# Patient Record
Sex: FEMALE | Race: WHITE | NOT HISPANIC OR LATINO | Employment: OTHER | URBAN - METROPOLITAN AREA
[De-identification: names, ages, dates, MRNs, and addresses within clinical notes are randomized per-mention and may not be internally consistent; named-entity substitution may affect disease eponyms.]

---

## 2017-05-02 ENCOUNTER — TRANSCRIBE ORDERS (OUTPATIENT)
Dept: ADMINISTRATIVE | Facility: HOSPITAL | Age: 77
End: 2017-05-02

## 2017-05-02 DIAGNOSIS — N64.4 BREAST PAIN, LEFT: Primary | ICD-10-CM

## 2017-05-05 ENCOUNTER — HOSPITAL ENCOUNTER (OUTPATIENT)
Dept: MAMMOGRAPHY | Facility: HOSPITAL | Age: 77
Discharge: HOME | End: 2017-05-05
Attending: FAMILY MEDICINE | Admitting: FAMILY MEDICINE

## 2017-05-05 ENCOUNTER — TRANSCRIBE ORDERS (OUTPATIENT)
Dept: MAMMOGRAPHY | Facility: HOSPITAL | Age: 77
End: 2017-05-05

## 2017-05-05 DIAGNOSIS — R92.8 ABNORMAL MAMMOGRAM: Primary | ICD-10-CM

## 2017-05-05 DIAGNOSIS — N64.4 BREAST PAIN, LEFT: ICD-10-CM

## 2017-05-05 PROCEDURE — G0206 DX MAMMO INCL CAD UNI: HCPCS | Performed by: RADIOLOGY

## 2017-05-05 PROCEDURE — G0206 DX MAMMO INCL CAD UNI: HCPCS

## 2017-05-05 PROCEDURE — G0279 TOMOSYNTHESIS, MAMMO: HCPCS

## 2017-05-05 PROCEDURE — G0279 TOMOSYNTHESIS, MAMMO: HCPCS | Performed by: RADIOLOGY

## 2017-05-11 ENCOUNTER — HOSPITAL ENCOUNTER (OUTPATIENT)
Dept: ULTRASOUND IMAGING | Facility: HOSPITAL | Age: 77
Discharge: HOME | End: 2017-05-11
Attending: FAMILY MEDICINE | Admitting: FAMILY MEDICINE

## 2017-05-11 DIAGNOSIS — R92.8 ABNORMAL MAMMOGRAM: ICD-10-CM

## 2017-05-11 PROCEDURE — 76642 ULTRASOUND BREAST LIMITED: CPT

## 2017-05-11 PROCEDURE — 76642 ULTRASOUND BREAST LIMITED: CPT | Performed by: RADIOLOGY

## 2017-09-14 ENCOUNTER — TRANSCRIBE ORDERS (OUTPATIENT)
Dept: ADMINISTRATIVE | Facility: HOSPITAL | Age: 77
End: 2017-09-14

## 2017-09-14 DIAGNOSIS — Z12.31 VISIT FOR SCREENING MAMMOGRAM: Primary | ICD-10-CM

## 2017-10-06 ENCOUNTER — APPOINTMENT (OUTPATIENT)
Dept: MAMMOGRAPHY | Facility: HOSPITAL | Age: 77
End: 2017-10-06
Attending: FAMILY MEDICINE

## 2017-10-20 ENCOUNTER — HOSPITAL ENCOUNTER (OUTPATIENT)
Dept: MAMMOGRAPHY | Facility: HOSPITAL | Age: 77
Discharge: HOME | End: 2017-10-20
Attending: FAMILY MEDICINE | Admitting: FAMILY MEDICINE

## 2017-10-20 DIAGNOSIS — Z12.31 VISIT FOR SCREENING MAMMOGRAM: ICD-10-CM

## 2017-10-20 PROCEDURE — G0202 SCR MAMMO BI INCL CAD: HCPCS

## 2017-10-20 PROCEDURE — 77063 BREAST TOMOSYNTHESIS BI: CPT

## 2017-10-23 PROCEDURE — 77063 BREAST TOMOSYNTHESIS BI: CPT | Performed by: RADIOLOGY

## 2017-10-23 PROCEDURE — G0202 SCR MAMMO BI INCL CAD: HCPCS | Performed by: RADIOLOGY

## 2018-07-16 ENCOUNTER — TRANSCRIBE ORDERS (OUTPATIENT)
Dept: ADMINISTRATIVE | Facility: HOSPITAL | Age: 78
End: 2018-07-16

## 2018-07-16 ENCOUNTER — HOSPITAL ENCOUNTER (OUTPATIENT)
Dept: GENERAL RADIOLOGY | Facility: HOSPITAL | Age: 78
Discharge: HOME | End: 2018-07-16
Attending: FAMILY MEDICINE | Admitting: FAMILY MEDICINE

## 2018-07-16 DIAGNOSIS — M70.61 TROCHANTERIC BURSITIS OF RIGHT HIP: ICD-10-CM

## 2018-07-16 DIAGNOSIS — M70.61 TROCHANTERIC BURSITIS OF RIGHT HIP: Primary | ICD-10-CM

## 2018-07-16 PROCEDURE — 73502 X-RAY EXAM HIP UNI 2-3 VIEWS: CPT

## 2018-08-02 ENCOUNTER — TRANSCRIBE ORDERS (OUTPATIENT)
Dept: ADMINISTRATIVE | Facility: HOSPITAL | Age: 78
End: 2018-08-02

## 2018-08-02 ENCOUNTER — HOSPITAL ENCOUNTER (OUTPATIENT)
Dept: GENERAL RADIOLOGY | Facility: HOSPITAL | Age: 78
Discharge: HOME | End: 2018-08-02
Attending: FAMILY MEDICINE | Admitting: FAMILY MEDICINE

## 2018-08-02 DIAGNOSIS — R05.9 COUGH: Primary | ICD-10-CM

## 2018-08-02 DIAGNOSIS — R05.9 COUGH: ICD-10-CM

## 2018-08-02 PROCEDURE — 71046 X-RAY EXAM CHEST 2 VIEWS: CPT

## 2018-08-27 ENCOUNTER — TRANSCRIBE ORDERS (OUTPATIENT)
Dept: ADMINISTRATIVE | Facility: HOSPITAL | Age: 78
End: 2018-08-27

## 2018-08-27 DIAGNOSIS — Z12.31 VISIT FOR SCREENING MAMMOGRAM: Primary | ICD-10-CM

## 2018-10-01 ENCOUNTER — OUTSIDE FACILITY SERVICE (OUTPATIENT)
Dept: GASTROENTEROLOGY | Facility: CLINIC | Age: 78
End: 2018-10-01

## 2018-10-01 ENCOUNTER — LAB REQUISITION (OUTPATIENT)
Dept: LAB | Facility: HOSPITAL | Age: 78
End: 2018-10-01

## 2018-10-01 DIAGNOSIS — K21.9 GASTRO-ESOPHAGEAL REFLUX DISEASE WITHOUT ESOPHAGITIS: ICD-10-CM

## 2018-10-01 PROCEDURE — 88305 TISSUE EXAM BY PATHOLOGIST: CPT | Performed by: INTERNAL MEDICINE

## 2018-10-01 PROCEDURE — G0500 MOD SEDAT ENDO SERVICE >5YRS: HCPCS | Performed by: INTERNAL MEDICINE

## 2018-10-01 PROCEDURE — 43239 EGD BIOPSY SINGLE/MULTIPLE: CPT | Performed by: INTERNAL MEDICINE

## 2018-10-02 LAB
CYTO UR: NORMAL
LAB AP CASE REPORT: NORMAL
LAB AP CLINICAL INFORMATION: NORMAL
PATH REPORT.FINAL DX SPEC: NORMAL
PATH REPORT.GROSS SPEC: NORMAL

## 2018-10-22 ENCOUNTER — HOSPITAL ENCOUNTER (OUTPATIENT)
Dept: MAMMOGRAPHY | Facility: HOSPITAL | Age: 78
Discharge: HOME | End: 2018-10-22
Attending: FAMILY MEDICINE | Admitting: FAMILY MEDICINE

## 2018-10-22 DIAGNOSIS — Z12.31 VISIT FOR SCREENING MAMMOGRAM: ICD-10-CM

## 2018-10-22 PROCEDURE — 77063 BREAST TOMOSYNTHESIS BI: CPT

## 2018-10-22 PROCEDURE — 77067 SCR MAMMO BI INCL CAD: CPT | Performed by: RADIOLOGY

## 2018-10-22 PROCEDURE — 77067 SCR MAMMO BI INCL CAD: CPT

## 2018-10-22 PROCEDURE — 77063 BREAST TOMOSYNTHESIS BI: CPT | Performed by: RADIOLOGY

## 2018-11-05 ENCOUNTER — HOSPITAL ENCOUNTER (OUTPATIENT)
Dept: MAMMOGRAPHY | Facility: HOSPITAL | Age: 78
Discharge: HOME | End: 2018-11-05
Admitting: RADIOLOGY

## 2018-11-05 DIAGNOSIS — R92.8 ABNORMAL MAMMOGRAM: ICD-10-CM

## 2018-11-05 PROCEDURE — G0279 TOMOSYNTHESIS, MAMMO: HCPCS

## 2018-11-05 PROCEDURE — 77065 DX MAMMO INCL CAD UNI: CPT | Performed by: RADIOLOGY

## 2018-11-05 PROCEDURE — 77065 DX MAMMO INCL CAD UNI: CPT

## 2018-11-05 PROCEDURE — G0279 TOMOSYNTHESIS, MAMMO: HCPCS | Performed by: RADIOLOGY

## 2018-12-26 ENCOUNTER — TRANSCRIBE ORDERS (OUTPATIENT)
Dept: ADMINISTRATIVE | Facility: HOSPITAL | Age: 78
End: 2018-12-26

## 2018-12-26 ENCOUNTER — HOSPITAL ENCOUNTER (OUTPATIENT)
Dept: GENERAL RADIOLOGY | Facility: HOSPITAL | Age: 78
Discharge: HOME | End: 2018-12-26
Attending: FAMILY MEDICINE | Admitting: FAMILY MEDICINE

## 2018-12-26 DIAGNOSIS — R09.89 CAROTID BRUIT, UNSPECIFIED LATERALITY: Primary | ICD-10-CM

## 2018-12-26 DIAGNOSIS — M50.30 DDD (DEGENERATIVE DISC DISEASE), CERVICAL: Primary | ICD-10-CM

## 2018-12-26 PROCEDURE — 72050 X-RAY EXAM NECK SPINE 4/5VWS: CPT

## 2019-01-03 ENCOUNTER — APPOINTMENT (OUTPATIENT)
Dept: CARDIOLOGY | Facility: HOSPITAL | Age: 79
End: 2019-01-03
Attending: FAMILY MEDICINE

## 2019-01-08 ENCOUNTER — HOSPITAL ENCOUNTER (OUTPATIENT)
Dept: CARDIOLOGY | Facility: HOSPITAL | Age: 79
Discharge: HOME | End: 2019-01-08
Attending: FAMILY MEDICINE | Admitting: FAMILY MEDICINE

## 2019-01-08 DIAGNOSIS — R09.89 CAROTID BRUIT, UNSPECIFIED LATERALITY: ICD-10-CM

## 2019-01-08 LAB
BH CV XLRA MEAS LEFT DIST CCA EDV: 20 CM/SEC
BH CV XLRA MEAS LEFT DIST CCA PSV: 62 CM/SEC
BH CV XLRA MEAS LEFT DIST ICA EDV: 36 CM/SEC
BH CV XLRA MEAS LEFT DIST ICA PSV: 66 CM/SEC
BH CV XLRA MEAS LEFT ICA/CCA RATIO: 1
BH CV XLRA MEAS LEFT MID CCA EDV: 18 CM/SEC
BH CV XLRA MEAS LEFT MID CCA PSV: 51 CM/SEC
BH CV XLRA MEAS LEFT MID ICA EDV: 33 CM/SEC
BH CV XLRA MEAS LEFT MID ICA PSV: 60 CM/SEC
BH CV XLRA MEAS LEFT PROX CCA EDV: 22 CM/SEC
BH CV XLRA MEAS LEFT PROX CCA PSV: 60 CM/SEC
BH CV XLRA MEAS LEFT PROX ECA EDV: 1 CM/SEC
BH CV XLRA MEAS LEFT PROX ECA PSV: 50 CM/SEC
BH CV XLRA MEAS LEFT PROX ICA EDV: 28 CM/SEC
BH CV XLRA MEAS LEFT PROX ICA PSV: 53 CM/SEC
BH CV XLRA MEAS LEFT PROX SCLA EDV: 0 CM/SEC
BH CV XLRA MEAS LEFT PROX SCLA PSV: 194 CM/SEC
BH CV XLRA MEAS LEFT VERTEBRAL A EDV: 14 CM/SEC
BH CV XLRA MEAS LEFT VERTEBRAL A PSV: 53 CM/SEC
BH CV XLRA MEAS RIGHT DIST CCA EDV: 18 CM/SEC
BH CV XLRA MEAS RIGHT DIST CCA PSV: 112 CM/SEC
BH CV XLRA MEAS RIGHT DIST ICA EDV: 31 CM/SEC
BH CV XLRA MEAS RIGHT DIST ICA PSV: 97 CM/SEC
BH CV XLRA MEAS RIGHT ICA/CCA RATIO: 1
BH CV XLRA MEAS RIGHT MID CCA EDV: 24 CM/SEC
BH CV XLRA MEAS RIGHT MID CCA PSV: 116 CM/SEC
BH CV XLRA MEAS RIGHT MID ICA EDV: 32 CM/SEC
BH CV XLRA MEAS RIGHT MID ICA PSV: 110 CM/SEC
BH CV XLRA MEAS RIGHT PROX CCA EDV: 29 CM/SEC
BH CV XLRA MEAS RIGHT PROX CCA PSV: 99 CM/SEC
BH CV XLRA MEAS RIGHT PROX ECA EDV: 2 CM/SEC
BH CV XLRA MEAS RIGHT PROX ECA PSV: 112 CM/SEC
BH CV XLRA MEAS RIGHT PROX ICA EDV: 29 CM/SEC
BH CV XLRA MEAS RIGHT PROX ICA PSV: 96 CM/SEC
BH CV XLRA MEAS RIGHT PROX SCLA EDV: 3 CM/SEC
BH CV XLRA MEAS RIGHT PROX SCLA PSV: 187 CM/SEC
BH CV XLRA MEAS RIGHT VERTEBRAL A EDV: 11 CM/SEC
BH CV XLRA MEAS RIGHT VERTEBRAL A PSV: 43 CM/SEC
LEFT ARM BP: NORMAL MMHG
RIGHT ARM BP: NORMAL MMHG

## 2019-01-08 PROCEDURE — 93880 EXTRACRANIAL BILAT STUDY: CPT

## 2019-01-08 PROCEDURE — 93880 EXTRACRANIAL BILAT STUDY: CPT | Performed by: INTERNAL MEDICINE

## 2019-01-22 ENCOUNTER — CONSULT (OUTPATIENT)
Dept: CARDIOLOGY | Facility: CLINIC | Age: 79
End: 2019-01-22

## 2019-01-22 VITALS
WEIGHT: 123 LBS | BODY MASS INDEX: 24.8 KG/M2 | HEIGHT: 59 IN | DIASTOLIC BLOOD PRESSURE: 64 MMHG | HEART RATE: 82 BPM | SYSTOLIC BLOOD PRESSURE: 112 MMHG

## 2019-01-22 DIAGNOSIS — E78.2 MIXED HYPERLIPIDEMIA: ICD-10-CM

## 2019-01-22 DIAGNOSIS — I73.9 PVD (PERIPHERAL VASCULAR DISEASE) (HCC): Primary | ICD-10-CM

## 2019-01-22 PROCEDURE — 93000 ELECTROCARDIOGRAM COMPLETE: CPT | Performed by: INTERNAL MEDICINE

## 2019-01-22 PROCEDURE — 99204 OFFICE O/P NEW MOD 45 MIN: CPT | Performed by: INTERNAL MEDICINE

## 2019-01-22 RX ORDER — OMEPRAZOLE 40 MG/1
40 CAPSULE, DELAYED RELEASE ORAL DAILY
COMMUNITY
End: 2021-09-15

## 2019-01-22 RX ORDER — TRIAMTERENE AND HYDROCHLOROTHIAZIDE 37.5; 25 MG/1; MG/1
1 CAPSULE ORAL EVERY MORNING
COMMUNITY
End: 2022-01-03

## 2019-01-22 RX ORDER — ASPIRIN 325 MG
325 TABLET, DELAYED RELEASE (ENTERIC COATED) ORAL DAILY
COMMUNITY

## 2019-01-22 RX ORDER — HYDROCODONE BITARTRATE AND ACETAMINOPHEN 5; 325 MG/1; MG/1
1 TABLET ORAL DAILY PRN
COMMUNITY
End: 2020-11-26 | Stop reason: HOSPADM

## 2019-01-22 RX ORDER — HYOSCYAMINE SULFATE 0.125 MG
TABLET,DISINTEGRATING ORAL DAILY
COMMUNITY
End: 2020-07-01

## 2019-01-22 RX ORDER — LANOLIN ALCOHOL/MO/W.PET/CERES
1000 CREAM (GRAM) TOPICAL DAILY
COMMUNITY
End: 2022-08-18

## 2019-01-22 RX ORDER — ATORVASTATIN CALCIUM 20 MG/1
20 TABLET, FILM COATED ORAL DAILY
COMMUNITY
End: 2019-03-27

## 2019-01-22 RX ORDER — LOSARTAN POTASSIUM 25 MG/1
25 TABLET ORAL DAILY
COMMUNITY
End: 2020-05-28

## 2019-01-22 RX ORDER — LEVOTHYROXINE SODIUM 0.05 MG/1
50 TABLET ORAL DAILY
COMMUNITY
End: 2020-11-19

## 2019-01-22 RX ORDER — ACETAMINOPHEN 500 MG
500 TABLET ORAL EVERY 6 HOURS PRN
COMMUNITY

## 2019-01-22 NOTE — PROGRESS NOTES
[facility] at [facility]  Consultation H&P  Danay Gonzales  1940    There is no work phone number on file..    VISIT DATE:  01/22/2019    PCP: DEANN Alejandra MD  [address] [facility] [address]  [facility] [address]    CC:  Chief Complaint   Patient presents with   • carotid artery stenosis     Previous cardiac studies and procedures:  January 2018 carotid duplex  · Proximal right internal carotid artery plaque without significant stenosis.  · Right internal carotid artery stenosis of 0-49%.  · Proximal left internal carotid artery plaque without significant stenosis.  · Left internal carotid artery stenosis of 0-49%.  · Incidental finding: left sided fluid filled cyst measuring (1.3 X 1.3 ) adjacent to left common carotid.  · Blunted and delayed waveforms throughout left common carotid, external carotid, and internal carotid arteries suggestive of significant proximal disease. Consider CTA for further evaluation.    ASSESSMENT:   Diagnosis Plan   1. PVD (peripheral vascular disease) (CMS/[facility])  CT Angiogram Chest With & Without Contrast    Creatinine, Serum   Suspect high-grade ostial or proximal left common carotid disease - currently appears asymptomatic.      PLAN:  -CTA chest to more definitively evaluate aortic arch and proximal portion of the left common carotid artery.  -Continue aggressive medical therapy.  Continue aspirin, afterload well controlled.  We'll gradually titrate pharmacologic therapy for dyslipidemia, goal LDL less than 70.  Repeat lipid panel pending.    History of Present Illness   78-year-old female with recently uncovered left carotid bruit.  She reported a transient right sided numbness and posterior neck discomfort after waking up an unusual position at night.  Symptoms gradually resolved.  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  Undergoing physical therapy.  She reports that she was remotely evaluated for potential TIA or stroke when she noted  "right-sided weakness approximately 15 years ago.  She still feels like her right side is subjectively weaker than her left side.  She has previously had imaging which is been negative for stroke.  She has a strong family history of cardiovascular disease.  She is compliant with medical therapy.  Blood pressures run less than 130/80 mmHg.  Atorvastatin was recently increased from 10 mg to 20 mg and October 2018.  She recently underwent carotid duplex imaging which was concerning for a significant proximal stenosis that was not well visualized by 2-D Doppler imaging.  She currently denies lightheadedness, focal paresthesias or paralysis, visual changes, or headaches.    Labs from November 2018: Hemoglobin A1c 6.8, total costal 214, triglycerides 214, HDL 35,   PHYSICAL EXAMINATION:  Vitals:    01/22/19 1423   BP: 112/64   BP Location: Left arm   Patient Position: Sitting   Pulse: 82   Weight: 55.8 kg (123 lb)   Height: 149.9 cm (59\")     General Appearance:    Alert, cooperative, no distress, appears stated age   Head:    Normocephalic, without obvious abnormality, atraumatic   Eyes:    conjunctiva/corneas clear, EOM's intact, fundi     benign, both eyes   Ears:    Normal TM's and external ear canals, both ears   Nose:   Nares normal, septum midline, mucosa normal, no drainage    or sinus tenderness   Throat:   Lips, mucosa, and tongue normal; teeth and gums normal   Neck:   Supple, symmetrical, trachea midline, no adenopathy;     thyroid:  no enlargement/tenderness/nodules; prominent bruit at the base of her left neck, bruit fades out as you approach the mandible was some minimal radiation along the left clavicle towards her shoulder.     Back:     Symmetric, no curvature, ROM normal, no CVA tenderness   Lungs:     Clear to auscultation bilaterally, respirations unlabored   Chest Wall:    No tenderness or deformity    Heart:    Regular rate and rhythm, S1 and S2 normal, 1/6 early peaking systolic murmur right " upper sternal border, rub   or gallop, normal carotid impulse bilaterally without bruit.   Abdomen:     Soft, non-tender, bowel sounds active all four quadrants,     no masses, no organomegaly   Extremities:   Extremities normal, atraumatic, no cyanosis or edema   Pulses:   2+ and symmetric all extremities   Skin:   Skin color, texture, turgor normal, no rashes or lesions   Lymph nodes:   Cervical, supraclavicular, and axillary nodes normal   Neurologic:   normal strength, sensation intact     throughout       Diagnostic Data:    ECG 12 Lead  Date/Time: 1/22/2019 2:27 PM  Performed by: Neal Abernathy III, MD  Authorized by: Neal Abernathy III, MD   Previous ECG: no previous ECG available  Rhythm: sinus rhythm  Clinical impression: normal ECG          No results found for: CHLPL, TRIG, HDL, LDLDIRECT  Lab Results   Component Value Date    GLUCOSE 137 (H) 06/07/2014    BUN 19 06/07/2014    CREATININE 0.7 06/07/2014     06/07/2014    K 4.3 06/07/2014     06/07/2014    CO2 26 06/07/2014     No results found for: HGBA1C  Lab Results   Component Value Date    WBC 8.68 06/07/2014    HGB 13.1 06/07/2014    HCT 40.6 06/07/2014     06/07/2014       PROBLEM LIST:  Patient Active Problem List   Diagnosis   • PVD (peripheral vascular disease) (CMS/HCC)       PAST MEDICAL HX  Past Medical History:   Diagnosis Date   • Arthritis    • GERD (gastroesophageal reflux disease)    • Glaucoma    • Kidney disease    • Menopause    • Mumps    • Stroke (CMS/[facility])        Allergies  Allergies   Allergen Reactions   • Erythromycin Rash       Current Medications    Current Outpatient Medications:   •  acetaminophen (TYLENOL) 500 MG tablet, Take 500 mg by mouth 2 (Two) Times a Day., Disp: , Rfl:   •  aspirin  MG tablet, Take 325 mg by mouth Daily., Disp: , Rfl:   •  atorvastatin (LIPITOR) 20 MG tablet, Take 20 mg by mouth Daily., Disp: , Rfl:   •  calcium citrate-vitamin d (CITRACAL) 200-250 MG-UNIT tablet tablet, Take  by  mouth 2 (Two) Times a Day., Disp: , Rfl:   •  HYDROcodone-acetaminophen (NORCO) 5-325 MG per tablet, Take 1 tablet by mouth Every 6 (Six) Hours As Needed., Disp: , Rfl:   •  hyoscyamine sulfate (ANASPAZ) 0.125 MG tablet dispersible disintegrating tablet, Take  by mouth Daily., Disp: , Rfl:   •  levothyroxine (SYNTHROID, LEVOTHROID) 50 MCG tablet, Take 50 mcg by mouth Daily., Disp: , Rfl:   •  losartan (COZAAR) 25 MG tablet, Take 25 mg by mouth Daily., Disp: , Rfl:   •  Multiple Vitamin (MULTI-VITAMIN DAILY PO), Take  by mouth Daily., Disp: , Rfl:   •  omeprazole (priLOSEC) 40 MG capsule, Take 40 mg by mouth Daily., Disp: , Rfl:   •  potassium phosphate, monobasic, (K-PHOS) 500 MG tablet, Take 500 mg by mouth Daily., Disp: , Rfl:   •  triamterene-hydrochlorothiazide (DYAZIDE) 37.5-25 MG per capsule, Take 1 capsule by mouth Every Morning., Disp: , Rfl:   •  vitamin B-12 (CYANOCOBALAMIN) 1000 MCG tablet, Take 1,000 mcg by mouth Daily., Disp: , Rfl:          ROS  Review of Systems   Constitution: Positive for malaise/fatigue.   Respiratory: Positive for cough.    Musculoskeletal: Positive for arthritis, muscle weakness and myalgias.   Gastrointestinal: Positive for heartburn.     All other body systems reviewed and are negative    SOCIAL HX  Social History     Socioeconomic History   • Marital status:      Spouse name: Not on file   • Number of children: Not on file   • Years of education: Not on file   • Highest education level: Not on file   Social Needs   • Financial resource strain: Not on file   • Food insecurity - worry: Not on file   • Food insecurity - inability: Not on file   • Transportation needs - medical: Not on file   • Transportation needs - non-medical: Not on file   Occupational History   • Not on file   Tobacco Use   • Smoking status: Never Smoker   • Smokeless tobacco: Never Used   Substance and Sexual Activity   • Alcohol use: No     Frequency: Never   • Drug use: No   • Sexual activity: Defer    Other Topics Concern   • Not on file   Social History Narrative   • Not on file       FAMILY HX  Family History   Problem Relation Age of Onset   • Heart disease Mother    • Heart attack Mother    • Lung disease Father    • Heart disease Sister    • Cancer Sister    • Heart disease Brother    • Cancer Sister    • Cancer Brother    • Heart disease Brother    • Heart disease Brother    • Breast cancer Neg Hx    • Ovarian cancer Neg Hx              Neal Abernathy III, MD, FACC

## 2019-03-22 ENCOUNTER — LAB REQUISITION (OUTPATIENT)
Dept: LAB | Facility: HOSPITAL | Age: 79
End: 2019-03-22

## 2019-03-22 ENCOUNTER — HOSPITAL ENCOUNTER (OUTPATIENT)
Dept: CT IMAGING | Facility: HOSPITAL | Age: 79
Discharge: HOME | End: 2019-03-22
Admitting: INTERNAL MEDICINE

## 2019-03-22 DIAGNOSIS — Z00.00 ROUTINE GENERAL MEDICAL EXAMINATION AT A HEALTH CARE FACILITY: ICD-10-CM

## 2019-03-22 LAB
ARTICHOKE IGE QN: 105 MG/DL (ref 0–130)
CHOLEST SERPL-MCNC: 162 MG/DL (ref 0–200)
CREAT BLD-MCNC: 1.5 MG/DL (ref 0.6–1.3)
GFR SERPL CREATININE-BSD FRML MDRD: 33 ML/MIN/1.73
HDLC SERPL-MCNC: 32 MG/DL (ref 40–60)
TRIGL SERPL-MCNC: 189 MG/DL (ref 0–150)

## 2019-03-22 PROCEDURE — 82565 ASSAY OF CREATININE: CPT

## 2019-03-22 PROCEDURE — 80061 LIPID PANEL: CPT | Performed by: INTERNAL MEDICINE

## 2019-03-22 PROCEDURE — 0 IOPAMIDOL PER 1 ML: Performed by: INTERNAL MEDICINE

## 2019-03-22 PROCEDURE — 82565 ASSAY OF CREATININE: CPT | Performed by: INTERNAL MEDICINE

## 2019-03-22 PROCEDURE — 71275 CT ANGIOGRAPHY CHEST: CPT

## 2019-03-22 RX ADMIN — IOPAMIDOL 50 ML: 755 INJECTION, SOLUTION INTRAVENOUS at 12:28

## 2019-03-25 ENCOUNTER — TELEPHONE (OUTPATIENT)
Dept: CARDIOLOGY | Facility: CLINIC | Age: 79
End: 2019-03-25

## 2019-03-25 DIAGNOSIS — R93.89 ABNORMAL FINDINGS ON DIAGNOSTIC IMAGING OF OTHER SPECIFIED BODY STRUCTURES: Primary | ICD-10-CM

## 2019-03-25 NOTE — TELEPHONE ENCOUNTER
Patient notified of GI referral placed to evaluate esophageal thickening noted on her CT scan. She verbalized understanding.States see .

## 2019-03-25 NOTE — TELEPHONE ENCOUNTER
----- Message from Neal Abernathy III, MD sent at 3/25/2019 10:34 AM EDT -----  Can you put in a referral to gastroenterology to evaluate esophageal thickening noted on her CT scan.

## 2019-03-26 LAB — CREAT BLDA-MCNC: 1.6 MG/DL (ref 0.6–1.3)

## 2019-03-27 ENCOUNTER — OFFICE VISIT (OUTPATIENT)
Dept: CARDIOLOGY | Facility: CLINIC | Age: 79
End: 2019-03-27

## 2019-03-27 VITALS
SYSTOLIC BLOOD PRESSURE: 110 MMHG | HEART RATE: 70 BPM | BODY MASS INDEX: 23.75 KG/M2 | WEIGHT: 121 LBS | DIASTOLIC BLOOD PRESSURE: 70 MMHG | HEIGHT: 60 IN | OXYGEN SATURATION: 98 %

## 2019-03-27 DIAGNOSIS — I73.9 PVD (PERIPHERAL VASCULAR DISEASE) (HCC): Primary | ICD-10-CM

## 2019-03-27 PROCEDURE — 99213 OFFICE O/P EST LOW 20 MIN: CPT | Performed by: INTERNAL MEDICINE

## 2019-03-27 RX ORDER — ROSUVASTATIN CALCIUM 20 MG/1
20 TABLET, COATED ORAL DAILY
Qty: 90 TABLET | Refills: 1 | Status: SHIPPED | OUTPATIENT
Start: 2019-03-27 | End: 2019-10-28

## 2019-03-27 RX ORDER — ROSUVASTATIN CALCIUM 20 MG/1
20 TABLET, COATED ORAL DAILY
Qty: 30 TABLET | Refills: 11 | Status: SHIPPED | OUTPATIENT
Start: 2019-03-27 | End: 2019-03-27 | Stop reason: SDUPTHER

## 2019-03-27 NOTE — PROGRESS NOTES
[facility] at [facility]  Office visit  Danay Gonzales  1940    There is no work phone number on file.    VISIT DATE:  03/27/2019    PCP: DEANN Alejandra MD  [address] [address]    CC:  Chief Complaint   Patient presents with   • PVD (peripheral vascular disease       Previous cardiac studies and procedures:  MRA head and neck 2014  1. No acute intracranial process.   2. Focal area of 50% stenosis in the proximal and/or M1 portion of the  left middle cerebral artery.   3. Normal neck MRA.    January 2019 carotid duplex  · Proximal right internal carotid artery plaque without significant stenosis.  · Right internal carotid artery stenosis of 0-49%.  · Proximal left internal carotid artery plaque without significant stenosis.  · Left internal carotid artery stenosis of 0-49%.  · Incidental finding: left sided fluid filled cyst measuring (1.3 X 1.3 ) adjacent to left common carotid.  · Blunted and delayed waveforms throughout left common carotid, external carotid, and internal carotid arteries suggestive of significant proximal disease. Consider CTA for further evaluation.    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    ASSESSMENT:   Diagnosis Plan   1. PVD (peripheral vascular disease) (CMS/[facility])         PLAN:  Left internal carotid artery stenosis: Greater than 90% ostial stenosis.  Imaging consistent with soft plaque.  Currently asymptomatic.  Anatomic variant regarding origins of the great vessels from the aortic arch increase the technical challenge of any potential percutaneous intervention.  Recommending continued aggressive medical therapy as long as she is asymptomatic.   "Continue aspirin and afterload reduction.  Goal blood pressure less than 130/80 mmHg.  Switching atorvastatin to rosuvastatin 20 mg p.o. daily, goal LDL less than 70.    Subjective  She reports stable functional capacity, a very active individual.  Denies chest pain, palpitations or dyspnea.  Remote history of TIA involving right-sided weakness.  No recent TIA-like symptoms.  No presyncope or syncope.  No visual changes.  Blood pressures running less than 130/80 mmHg.  She is compliant with medical therapy.  Reviewed most recent lipid profile which did improve with interval increase in atorvastatin from 10 mg p.o. daily to 20 mg p.o. daily.  Reviewed CTA imaging with neuro interventional colleague and discussed case.    PHYSICAL EXAMINATION:  Vitals:    03/27/19 1317   BP: 110/70   BP Location: Right arm   Patient Position: Sitting   Pulse: 70   SpO2: 98%   Weight: 54.9 kg (121 lb)   Height: 152.4 cm (60\")     General Appearance:    Alert, cooperative, no distress, appears stated age   Head:    Normocephalic, without obvious abnormality, atraumatic   Eyes:    conjunctiva/corneas clear   Nose:   Nares normal, septum midline, mucosa normal, no drainage   Throat:   Lips, teeth and gums normal   Neck:   Supple, symmetrical, trachea midline, no carotid    bruit or JVD   Lungs:     Clear to auscultation bilaterally, respirations unlabored   Chest Wall:    No tenderness or deformity    Heart:    Regular rate and rhythm, S1 and S2 normal, no murmur, rub   or gallop, bilateral carotid bruit, left greater than right.   Abdomen:     Soft, non-tender   Extremities:   Extremities normal, atraumatic, no cyanosis or edema   Pulses:   2+ and symmetric all extremities   Skin:   Skin color, texture, turgor normal, no rashes or lesions       Diagnostic Data:  Procedures  Lab Results   Component Value Date    TRIG 189 (H) 03/22/2019    HDL 32 (L) 03/22/2019     Lab Results   Component Value Date    GLUCOSE 137 (H) 06/07/2014    BUN 19 " 06/07/2014    CREATININE 1.50 (H) 03/22/2019     06/07/2014    K 4.3 06/07/2014     06/07/2014    CO2 26 06/07/2014     No results found for: HGBA1C  Lab Results   Component Value Date    WBC 8.68 06/07/2014    HGB 13.1 06/07/2014    HCT 40.6 06/07/2014     06/07/2014       Allergies  Allergies   Allergen Reactions   • Erythromycin Rash       Current Medications    Current Outpatient Medications:   •  acetaminophen (TYLENOL) 500 MG tablet, Take 500 mg by mouth 2 (Two) Times a Day., Disp: , Rfl:   •  aspirin  MG tablet, Take 325 mg by mouth Daily., Disp: , Rfl:   •  atorvastatin (LIPITOR) 20 MG tablet, Take 20 mg by mouth Daily., Disp: , Rfl:   •  calcium citrate-vitamin d (CITRACAL) 200-250 MG-UNIT tablet tablet, Take  by mouth 2 (Two) Times a Day., Disp: , Rfl:   •  HYDROcodone-acetaminophen (NORCO) 5-325 MG per tablet, Take 1 tablet by mouth Every 6 (Six) Hours As Needed., Disp: , Rfl:   •  hyoscyamine sulfate (ANASPAZ) 0.125 MG tablet dispersible disintegrating tablet, Take  by mouth Daily., Disp: , Rfl:   •  levothyroxine (SYNTHROID, LEVOTHROID) 50 MCG tablet, Take 50 mcg by mouth Daily., Disp: , Rfl:   •  losartan (COZAAR) 25 MG tablet, Take 25 mg by mouth Daily., Disp: , Rfl:   •  Multiple Vitamin (MULTI-VITAMIN DAILY PO), Take  by mouth Daily., Disp: , Rfl:   •  omeprazole (priLOSEC) 40 MG capsule, Take 40 mg by mouth Daily., Disp: , Rfl:   •  potassium phosphate, monobasic, (K-PHOS) 500 MG tablet, Take 500 mg by mouth Daily., Disp: , Rfl:   •  triamterene-hydrochlorothiazide (DYAZIDE) 37.5-25 MG per capsule, Take 1 capsule by mouth Every Morning., Disp: , Rfl:   •  vitamin B-12 (CYANOCOBALAMIN) 1000 MCG tablet, Take 1,000 mcg by mouth Daily., Disp: , Rfl:           ROS  Review of Systems   Cardiovascular: Negative for chest pain and irregular heartbeat.   Respiratory: Positive for cough and shortness of breath.        SOCIAL HX  Social History     Socioeconomic History   • Marital  status:      Spouse name: Not on file   • Number of children: Not on file   • Years of education: Not on file   • Highest education level: Not on file   Tobacco Use   • Smoking status: Never Smoker   • Smokeless tobacco: Never Used   Substance and Sexual Activity   • Alcohol use: No     Frequency: Never   • Drug use: No   • Sexual activity: Defer       FAMILY HX  Family History   Problem Relation Age of Onset   • Heart disease Mother    • Heart attack Mother    • Lung disease Father    • Heart disease Sister    • Cancer Sister    • Heart disease Brother    • Cancer Sister    • Cancer Brother    • Heart disease Brother    • Heart disease Brother    • Breast cancer Neg Hx    • Ovarian cancer Neg Hx              Neal Abernathy III, MD, FACC

## 2019-04-16 ENCOUNTER — PRIOR AUTHORIZATION (OUTPATIENT)
Dept: CARDIOLOGY | Facility: CLINIC | Age: 79
End: 2019-04-16

## 2019-04-16 NOTE — TELEPHONE ENCOUNTER
PA initiated for Rosuvastatin initiated thru [employer] # [phone].Talked to Shira. Reference # [id number]. Awaiting determination.

## 2019-09-10 ENCOUNTER — TRANSCRIBE ORDERS (OUTPATIENT)
Dept: ADMINISTRATIVE | Facility: HOSPITAL | Age: 79
End: 2019-09-10

## 2019-09-10 DIAGNOSIS — Z12.31 VISIT FOR SCREENING MAMMOGRAM: Primary | ICD-10-CM

## 2019-10-28 ENCOUNTER — HOSPITAL ENCOUNTER (OUTPATIENT)
Dept: GENERAL RADIOLOGY | Facility: HOSPITAL | Age: 79
Discharge: HOME | End: 2019-10-28
Admitting: INTERNAL MEDICINE

## 2019-10-28 ENCOUNTER — LAB REQUISITION (OUTPATIENT)
Dept: LAB | Facility: HOSPITAL | Age: 79
End: 2019-10-28

## 2019-10-28 ENCOUNTER — OFFICE VISIT (OUTPATIENT)
Dept: CARDIOLOGY | Facility: CLINIC | Age: 79
End: 2019-10-28

## 2019-10-28 VITALS
HEIGHT: 60 IN | HEART RATE: 70 BPM | SYSTOLIC BLOOD PRESSURE: 132 MMHG | DIASTOLIC BLOOD PRESSURE: 56 MMHG | BODY MASS INDEX: 21.99 KG/M2 | OXYGEN SATURATION: 97 % | WEIGHT: 112 LBS

## 2019-10-28 DIAGNOSIS — I73.9 PVD (PERIPHERAL VASCULAR DISEASE) (HCC): Primary | ICD-10-CM

## 2019-10-28 DIAGNOSIS — Z00.00 ROUTINE GENERAL MEDICAL EXAMINATION AT A HEALTH CARE FACILITY: ICD-10-CM

## 2019-10-28 DIAGNOSIS — R07.2 PRECORDIAL PAIN: ICD-10-CM

## 2019-10-28 DIAGNOSIS — R06.09 DYSPNEA ON EXERTION: ICD-10-CM

## 2019-10-28 LAB
D DIMER PPP FEU-MCNC: 0.44 MCGFEU/ML (ref 0–0.56)
NT-PROBNP SERPL-MCNC: 101.4 PG/ML (ref 5–1800)
TROPONIN T SERPL-MCNC: <0.01 NG/ML (ref 0–0.03)

## 2019-10-28 PROCEDURE — 36415 COLL VENOUS BLD VENIPUNCTURE: CPT | Performed by: INTERNAL MEDICINE

## 2019-10-28 PROCEDURE — 83880 ASSAY OF NATRIURETIC PEPTIDE: CPT | Performed by: INTERNAL MEDICINE

## 2019-10-28 PROCEDURE — 85379 FIBRIN DEGRADATION QUANT: CPT | Performed by: INTERNAL MEDICINE

## 2019-10-28 PROCEDURE — 99214 OFFICE O/P EST MOD 30 MIN: CPT | Performed by: INTERNAL MEDICINE

## 2019-10-28 PROCEDURE — 71046 X-RAY EXAM CHEST 2 VIEWS: CPT

## 2019-10-28 PROCEDURE — 84484 ASSAY OF TROPONIN QUANT: CPT | Performed by: INTERNAL MEDICINE

## 2019-10-28 RX ORDER — ROSUVASTATIN CALCIUM 40 MG/1
40 TABLET, COATED ORAL DAILY
Qty: 90 TABLET | Refills: 1 | Status: SHIPPED | OUTPATIENT
Start: 2019-10-28 | End: 2019-10-31 | Stop reason: SDUPTHER

## 2019-10-28 NOTE — PROGRESS NOTES
[facility] at [facility]  Office visit  Danay Gonzales  1940    There is no work phone number on file.    VISIT DATE:  10/28/2019      PCP: DEANN Alejandra MD  [address] [address]    CC:  Chief Complaint   Patient presents with   • PVD (peripheral vascular disease) (CMS/HCC)       Previous cardiac studies and procedures:  MRA head and neck 2014  1. No acute intracranial process.   2. Focal area of 50% stenosis in the proximal and/or M1 portion of the  left middle cerebral artery.   3. Normal neck MRA.    January 2019 carotid duplex  · Proximal right internal carotid artery plaque without significant stenosis.  · Right internal carotid artery stenosis of 0-49%.  · Proximal left internal carotid artery plaque without significant stenosis.  · Left internal carotid artery stenosis of 0-49%.  · Incidental finding: left sided fluid filled cyst measuring (1.3 X 1.3 ) adjacent to left common carotid.  · Blunted and delayed waveforms throughout left common carotid, external carotid, and internal carotid arteries suggestive of significant proximal disease. Consider CTA for further evaluation.    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    ASSESSMENT:   Diagnosis Plan   1. PVD (peripheral vascular disease) (CMS/HCC)     2. Precordial pain  Troponin I    Stress Test With Myocardial Perfusion (1 Day)   3. Dyspnea on exertion  proBNP    D-dimer, Quantitative    Adult Transthoracic Echo Complete W/ Cont if Necessary Per Protocol    XR Chest 2 View       PLAN:  Left internal carotid artery stenosis: Greater than 90% ostial stenosis.  Imaging consistent with soft plaque.  Currently asymptomatic.   "Anatomic variant regarding origins of the great vessels from the aortic arch increase the technical challenge of any potential percutaneous intervention.  Recommending continued aggressive medical therapy as long as she is asymptomatic.  Continue aspirin and afterload reduction.  Goal blood pressure less than 130/80 mmHg.  Increasing rosuvastatin to 40 mg p.o. daily, goal LDL less than 70.    Intracranial atherosclerotic disease: Currently symptomatic.  Continue aggressive risk factor modification.    Sudden onset dyspnea and chest discomfort: Troponin, proBNP and d-dimer.  PA and lateral chest x-ray.  Transthoracic echo to assess underlying myocardial structure and function.  Jacquelin scan myocardial perfusion imaging for ischemia evaluation.    Subjective  She experienced an episode of sudden onset dyspnea and precordial chest discomfort after loading some laundry last evening.  Moderate in intensity, eventually radiated to her back.  Lasted for 2 to 3 hours.  Otherwise no change in baseline functional capacity.  She is compliant with medical therapy and feels back to baseline today.  Recent had a long car trip approximately 3 weeks ago on a trip up to [place] state.  Remote history of TIA involving right-sided weakness.  No recent TIA-like symptoms.  No presyncope or syncope.  No visual changes.  Blood pressures running less than 130/80 mmHg.  She is compliant with medical therapy.  Reviewed most recent lipid profile.  Reviewed CTA imaging with neuro interventional colleague and discussed case.    PHYSICAL EXAMINATION:  Vitals:    10/28/19 1121   BP: 132/56   BP Location: Right arm   Patient Position: Sitting   Pulse: 70   SpO2: 97%   Weight: 50.8 kg (112 lb)   Height: 152.4 cm (60\")     General Appearance:    Alert, cooperative, no distress, appears stated age   Head:    Normocephalic, without obvious abnormality, atraumatic   Eyes:    conjunctiva/corneas clear   Nose:   Nares normal, septum midline, mucosa normal, " no drainage   Throat:   Lips, teeth and gums normal   Neck:   Supple, symmetrical, trachea midline, no carotid    bruit or JVD   Lungs:     Clear to auscultation bilaterally, respirations unlabored   Chest Wall:    No tenderness or deformity    Heart:    Regular rate and rhythm, S1 and S2 normal, no murmur, rub   or gallop, bilateral carotid bruit, left greater than right.   Abdomen:     Soft, non-tender   Extremities:   Extremities normal, atraumatic, no cyanosis or edema   Pulses:   2+ and symmetric all extremities   Skin:   Skin color, texture, turgor normal, no rashes or lesions       Diagnostic Data:  Procedures  Lab Results   Component Value Date    TRIG 189 (H) 03/22/2019    HDL 32 (L) 03/22/2019     Lab Results   Component Value Date    GLUCOSE 137 (H) 06/07/2014    BUN 19 06/07/2014    CREATININE 1.50 (H) 03/22/2019     06/07/2014    K 4.3 06/07/2014     06/07/2014    CO2 26 06/07/2014     No results found for: HGBA1C  Lab Results   Component Value Date    WBC 8.68 06/07/2014    HGB 13.1 06/07/2014    HCT 40.6 06/07/2014     06/07/2014       Allergies  Allergies   Allergen Reactions   • Erythromycin Rash       Current Medications    Current Outpatient Medications:   •  acetaminophen (TYLENOL) 500 MG tablet, Take 500 mg by mouth 2 (Two) Times a Day., Disp: , Rfl:   •  aspirin  MG tablet, Take 325 mg by mouth Daily., Disp: , Rfl:   •  calcium citrate-vitamin d (CITRACAL) 200-250 MG-UNIT tablet tablet, Take  by mouth 2 (Two) Times a Day., Disp: , Rfl:   •  HYDROcodone-acetaminophen (NORCO) 5-325 MG per tablet, Take 1 tablet by mouth Every 6 (Six) Hours As Needed., Disp: , Rfl:   •  hyoscyamine sulfate (ANASPAZ) 0.125 MG tablet dispersible disintegrating tablet, Take  by mouth Daily., Disp: , Rfl:   •  levothyroxine (SYNTHROID, LEVOTHROID) 50 MCG tablet, Take 50 mcg by mouth Daily., Disp: , Rfl:   •  losartan (COZAAR) 25 MG tablet, Take 25 mg by mouth Daily., Disp: , Rfl:   •  Multiple  Vitamin (MULTI-VITAMIN DAILY PO), Take  by mouth Daily., Disp: , Rfl:   •  omeprazole (priLOSEC) 40 MG capsule, Take 40 mg by mouth Daily., Disp: , Rfl:   •  potassium phosphate, monobasic, (K-PHOS) 500 MG tablet, Take 500 mg by mouth Daily., Disp: , Rfl:   •  triamterene-hydrochlorothiazide (DYAZIDE) 37.5-25 MG per capsule, Take 1 capsule by mouth Every Morning., Disp: , Rfl:   •  vitamin B-12 (CYANOCOBALAMIN) 1000 MCG tablet, Take 1,000 mcg by mouth Daily., Disp: , Rfl:   •  rosuvastatin (CRESTOR) 40 MG tablet, Take 1 tablet by mouth Daily., Disp: 90 tablet, Rfl: 1          ROS  Review of Systems   Cardiovascular: Positive for dyspnea on exertion. Negative for chest pain and irregular heartbeat.   Respiratory: Positive for cough and shortness of breath.        SOCIAL HX  Social History     Socioeconomic History   • Marital status:      Spouse name: Not on file   • Number of children: Not on file   • Years of education: Not on file   • Highest education level: Not on file   Tobacco Use   • Smoking status: Never Smoker   • Smokeless tobacco: Never Used   Substance and Sexual Activity   • Alcohol use: No     Frequency: Never   • Drug use: No   • Sexual activity: Defer       FAMILY HX  Family History   Problem Relation Age of Onset   • Heart disease Mother    • Heart attack Mother    • Lung disease Father    • Heart disease Sister    • Cancer Sister    • Heart disease Brother    • Cancer Sister    • Cancer Brother    • Heart disease Brother    • Heart disease Brother    • Breast cancer Neg Hx    • Ovarian cancer Neg Hx              Neal Abernathy III, MD, [facility]

## 2019-10-31 RX ORDER — ROSUVASTATIN CALCIUM 40 MG/1
40 TABLET, COATED ORAL DAILY
Qty: 90 TABLET | Refills: 1 | Status: SHIPPED | OUTPATIENT
Start: 2019-10-31 | End: 2020-05-20

## 2019-11-02 ENCOUNTER — RESULTS ENCOUNTER (OUTPATIENT)
Dept: CARDIOLOGY | Facility: CLINIC | Age: 79
End: 2019-11-02

## 2019-11-02 DIAGNOSIS — R06.09 DYSPNEA ON EXERTION: ICD-10-CM

## 2019-11-12 ENCOUNTER — HOSPITAL ENCOUNTER (OUTPATIENT)
Dept: MAMMOGRAPHY | Facility: HOSPITAL | Age: 79
Discharge: HOME | End: 2019-11-12
Admitting: FAMILY MEDICINE

## 2019-11-12 DIAGNOSIS — Z12.31 VISIT FOR SCREENING MAMMOGRAM: ICD-10-CM

## 2019-11-12 PROCEDURE — 77067 SCR MAMMO BI INCL CAD: CPT | Performed by: RADIOLOGY

## 2019-11-12 PROCEDURE — 77063 BREAST TOMOSYNTHESIS BI: CPT | Performed by: RADIOLOGY

## 2019-11-12 PROCEDURE — 77067 SCR MAMMO BI INCL CAD: CPT

## 2019-11-12 PROCEDURE — 77063 BREAST TOMOSYNTHESIS BI: CPT

## 2019-11-25 ENCOUNTER — HOSPITAL ENCOUNTER (OUTPATIENT)
Dept: CARDIOLOGY | Facility: HOSPITAL | Age: 79
Discharge: HOME | End: 2019-11-25
Admitting: INTERNAL MEDICINE

## 2019-11-25 ENCOUNTER — HOSPITAL ENCOUNTER (OUTPATIENT)
Dept: CARDIOLOGY | Facility: HOSPITAL | Age: 79
Discharge: HOME | End: 2019-11-25

## 2019-11-25 VITALS
HEART RATE: 68 BPM | WEIGHT: 111.99 LBS | BODY MASS INDEX: 21.99 KG/M2 | SYSTOLIC BLOOD PRESSURE: 150 MMHG | DIASTOLIC BLOOD PRESSURE: 72 MMHG | HEIGHT: 60 IN

## 2019-11-25 VITALS
DIASTOLIC BLOOD PRESSURE: 61 MMHG | HEIGHT: 60 IN | WEIGHT: 111 LBS | SYSTOLIC BLOOD PRESSURE: 141 MMHG | BODY MASS INDEX: 21.79 KG/M2

## 2019-11-25 DIAGNOSIS — R06.09 DYSPNEA ON EXERTION: ICD-10-CM

## 2019-11-25 DIAGNOSIS — R07.2 PRECORDIAL PAIN: ICD-10-CM

## 2019-11-25 LAB
AORTIC DIMENSIONLESS INDEX: 0.4 (DI)
BH CV ECHO MEAS - AO MAX PG (FULL): 14.3 MMHG
BH CV ECHO MEAS - AO MAX PG: 16 MMHG
BH CV ECHO MEAS - AO MEAN PG (FULL): 9 MMHG
BH CV ECHO MEAS - AO MEAN PG: 9 MMHG
BH CV ECHO MEAS - AO ROOT AREA (BSA CORRECTED): 2.1
BH CV ECHO MEAS - AO ROOT AREA: 7.1 CM^2
BH CV ECHO MEAS - AO ROOT DIAM: 3 CM
BH CV ECHO MEAS - AO V2 MAX: 202 CM/SEC
BH CV ECHO MEAS - AO V2 MEAN: 145 CM/SEC
BH CV ECHO MEAS - AO V2 VTI: 44.8 CM
BH CV ECHO MEAS - ASC AORTA: 3.5 CM
BH CV ECHO MEAS - AVA(I,A): 1.3 CM^2
BH CV ECHO MEAS - AVA(I,D): 1.3 CM^2
BH CV ECHO MEAS - AVA(V,A): 1.1 CM^2
BH CV ECHO MEAS - AVA(V,D): 1.1 CM^2
BH CV ECHO MEAS - BSA(HAYCOCK): 1.5 M^2
BH CV ECHO MEAS - BSA: 1.5 M^2
BH CV ECHO MEAS - BZI_BMI: 21.7 KILOGRAMS/M^2
BH CV ECHO MEAS - BZI_METRIC_HEIGHT: 152.4 CM
BH CV ECHO MEAS - BZI_METRIC_WEIGHT: 50.3 KG
BH CV ECHO MEAS - EDV(CUBED): 50.7 ML
BH CV ECHO MEAS - EDV(MOD-SP2): 34.6 ML
BH CV ECHO MEAS - EDV(MOD-SP4): 32 ML
BH CV ECHO MEAS - EDV(TEICH): 58.1 ML
BH CV ECHO MEAS - EF(CUBED): 76 %
BH CV ECHO MEAS - EF(MOD-BP): 75.4 %
BH CV ECHO MEAS - EF(MOD-SP2): 75.2 %
BH CV ECHO MEAS - EF(MOD-SP4): 79.6 %
BH CV ECHO MEAS - EF(TEICH): 68.8 %
BH CV ECHO MEAS - ESV(CUBED): 12.2 ML
BH CV ECHO MEAS - ESV(MOD-SP2): 8.6 ML
BH CV ECHO MEAS - ESV(MOD-SP4): 6.5 ML
BH CV ECHO MEAS - ESV(TEICH): 18.1 ML
BH CV ECHO MEAS - FS: 37.8 %
BH CV ECHO MEAS - IVS/LVPW: 1
BH CV ECHO MEAS - IVSD: 0.9 CM
BH CV ECHO MEAS - LA DIMENSION: 3 CM
BH CV ECHO MEAS - LA/AO: 1
BH CV ECHO MEAS - LAD MAJOR: 4.3 CM
BH CV ECHO MEAS - LAT PEAK E' VEL: 8.1 CM/SEC
BH CV ECHO MEAS - LATERAL E/E' RATIO: 11.5
BH CV ECHO MEAS - LV DIASTOLIC VOL/BSA (35-75): 22 ML/M^2
BH CV ECHO MEAS - LV MASS(C)D: 96.9 GRAMS
BH CV ECHO MEAS - LV MASS(C)DI: 66.7 GRAMS/M^2
BH CV ECHO MEAS - LV MAX PG: 2 MMHG
BH CV ECHO MEAS - LV MEAN PG: 1 MMHG
BH CV ECHO MEAS - LV SYSTOLIC VOL/BSA (12-30): 4.5 ML/M^2
BH CV ECHO MEAS - LV V1 MAX: 70.9 CM/SEC
BH CV ECHO MEAS - LV V1 MEAN: 49 CM/SEC
BH CV ECHO MEAS - LV V1 VTI: 19.1 CM
BH CV ECHO MEAS - LVIDD: 3.7 CM
BH CV ECHO MEAS - LVIDS: 2.3 CM
BH CV ECHO MEAS - LVLD AP2: 5.9 CM
BH CV ECHO MEAS - LVLD AP4: 5.9 CM
BH CV ECHO MEAS - LVLS AP2: 4.8 CM
BH CV ECHO MEAS - LVLS AP4: 4 CM
BH CV ECHO MEAS - LVOT AREA (M): 3.1 CM^2
BH CV ECHO MEAS - LVOT AREA: 3.1 CM^2
BH CV ECHO MEAS - LVOT DIAM: 2 CM
BH CV ECHO MEAS - LVPWD: 0.9 CM
BH CV ECHO MEAS - MED PEAK E' VEL: 7.2 CM/SEC
BH CV ECHO MEAS - MEDIAL E/E' RATIO: 12.9
BH CV ECHO MEAS - MV A MAX VEL: 121 CM/SEC
BH CV ECHO MEAS - MV DEC SLOPE: 350 CM/SEC^2
BH CV ECHO MEAS - MV DEC TIME: 0.26 SEC
BH CV ECHO MEAS - MV E MAX VEL: 92.4 CM/SEC
BH CV ECHO MEAS - MV E/A: 0.76
BH CV ECHO MEAS - MV MAX PG: 6.6 MMHG
BH CV ECHO MEAS - MV MEAN PG: 2 MMHG
BH CV ECHO MEAS - MV V2 MAX: 128 CM/SEC
BH CV ECHO MEAS - MV V2 MEAN: 68.9 CM/SEC
BH CV ECHO MEAS - MV V2 VTI: 31.7 CM
BH CV ECHO MEAS - MVA(VTI): 1.9 CM^2
BH CV ECHO MEAS - PA ACC TIME: 0.15 SEC
BH CV ECHO MEAS - PA MAX PG: 3.7 MMHG
BH CV ECHO MEAS - PA PR(ACCEL): 12.4 MMHG
BH CV ECHO MEAS - PA V2 MAX: 96.4 CM/SEC
BH CV ECHO MEAS - RAP SYSTOLE: 3 MMHG
BH CV ECHO MEAS - RVSP: 28 MMHG
BH CV ECHO MEAS - SI(AO): 220.8 ML/M^2
BH CV ECHO MEAS - SI(CUBED): 26.5 ML/M^2
BH CV ECHO MEAS - SI(LVOT): 41.3 ML/M^2
BH CV ECHO MEAS - SI(MOD-SP2): 17.9 ML/M^2
BH CV ECHO MEAS - SI(MOD-SP4): 17.5 ML/M^2
BH CV ECHO MEAS - SI(TEICH): 27.5 ML/M^2
BH CV ECHO MEAS - SV(AO): 320.9 ML
BH CV ECHO MEAS - SV(CUBED): 38.5 ML
BH CV ECHO MEAS - SV(LVOT): 60 ML
BH CV ECHO MEAS - SV(MOD-SP2): 26 ML
BH CV ECHO MEAS - SV(MOD-SP4): 25.5 ML
BH CV ECHO MEAS - SV(TEICH): 40 ML
BH CV ECHO MEAS - TAPSE (>1.6): 1.97 CM2
BH CV ECHO MEAS - TR MAX PG: 25 MMHG
BH CV ECHO MEAS - TR MAX VEL: 248 CM/SEC
BH CV ECHO MEASUREMENTS AVERAGE E/E' RATIO: 12.08
BH CV STRESS BP STAGE 2: NORMAL
BH CV STRESS BP STAGE 4: NORMAL
BH CV STRESS COMMENTS STAGE 1: NORMAL
BH CV STRESS DOSE REGADENOSON STAGE 1: 0.4
BH CV STRESS DURATION MIN STAGE 1: 1
BH CV STRESS DURATION MIN STAGE 2: 1
BH CV STRESS DURATION MIN STAGE 3: 1
BH CV STRESS DURATION MIN STAGE 4: 1
BH CV STRESS DURATION SEC STAGE 2: 0
BH CV STRESS HR STAGE 4: 94
BH CV STRESS PROTOCOL 1: NORMAL
BH CV STRESS RECOVERY BP: NORMAL MMHG
BH CV STRESS RECOVERY HR: 93 BPM
BH CV STRESS STAGE 1: 1
BH CV STRESS STAGE 2: 2
BH CV STRESS STAGE 3: 3
BH CV STRESS STAGE 4: 4
BH CV VAS BP LEFT ARM: NORMAL MMHG
BH CV XLRA - RV BASE: 2.3 CM
BH CV XLRA - RV LENGTH: 4.6 CM
BH CV XLRA - RV MID: 2.1 CM
BH CV XLRA - TDI S': 8.38 CM/SEC
LEFT ATRIUM VOLUME INDEX: 29.2 ML/M^2
LEFT ATRIUM VOLUME: 42.5 ML
LV EF NUC BP: 60 %
MAXIMAL PREDICTED HEART RATE: 140 BPM
MAXIMAL PREDICTED HEART RATE: 140 BPM
PERCENT MAX PREDICTED HR: 77.14 %
STRESS BASELINE BP: NORMAL MMHG
STRESS BASELINE HR: 73 BPM
STRESS PERCENT HR: 91 %
STRESS POST PEAK BP: NORMAL MMHG
STRESS POST PEAK HR: 108 BPM
STRESS TARGET HR: 119 BPM
STRESS TARGET HR: 119 BPM

## 2019-11-25 PROCEDURE — 93017 CV STRESS TEST TRACING ONLY: CPT

## 2019-11-25 PROCEDURE — 93018 CV STRESS TEST I&R ONLY: CPT | Performed by: INTERNAL MEDICINE

## 2019-11-25 PROCEDURE — A9500 TC99M SESTAMIBI: HCPCS | Performed by: INTERNAL MEDICINE

## 2019-11-25 PROCEDURE — 93306 TTE W/DOPPLER COMPLETE: CPT | Performed by: INTERNAL MEDICINE

## 2019-11-25 PROCEDURE — 78452 HT MUSCLE IMAGE SPECT MULT: CPT

## 2019-11-25 PROCEDURE — 78452 HT MUSCLE IMAGE SPECT MULT: CPT | Performed by: INTERNAL MEDICINE

## 2019-11-25 PROCEDURE — 0 TECHNETIUM SESTAMIBI: Performed by: INTERNAL MEDICINE

## 2019-11-25 PROCEDURE — 93306 TTE W/DOPPLER COMPLETE: CPT

## 2019-11-25 PROCEDURE — 25010000002 REGADENOSON 0.4 MG/5ML SOLUTION: Performed by: INTERNAL MEDICINE

## 2019-11-25 RX ORDER — CAFFEINE CITRATE 20 MG/ML
60 SOLUTION INTRAVENOUS
Status: DISCONTINUED | OUTPATIENT
Start: 2019-11-25 | End: 2019-11-26 | Stop reason: HOSPADM

## 2019-11-25 RX ADMIN — TECHNETIUM TC 99M SESTAMIBI 1 DOSE: 1 INJECTION INTRAVENOUS at 11:15

## 2019-11-25 RX ADMIN — TECHNETIUM TC 99M SESTAMIBI 1 DOSE: 1 INJECTION INTRAVENOUS at 13:10

## 2019-11-25 RX ADMIN — CAFFEINE CITRATE 60 MG: 20 INJECTION, SOLUTION INTRAVENOUS at 13:30

## 2019-11-25 RX ADMIN — REGADENOSON 0.4 MG: 0.08 INJECTION, SOLUTION INTRAVENOUS at 13:07

## 2019-11-26 ENCOUNTER — TELEPHONE (OUTPATIENT)
Dept: CARDIOLOGY | Facility: CLINIC | Age: 79
End: 2019-11-26

## 2019-11-26 NOTE — TELEPHONE ENCOUNTER
----- Message from Neal Abernathy III, MD sent at 11/26/2019 10:28 AM EST -----  Strength of heart muscle normal.  There is a mild issue with 1 of your heart valves which should not be causing any symptoms currently.  We will keep a close eye on it.  We will discuss it further at follow-up.

## 2019-11-26 NOTE — TELEPHONE ENCOUNTER
----- Message from Neal Abernathy III, MD sent at 11/26/2019 10:28 AM EST -----  No evidence of blockage noted on your stress test.

## 2020-01-08 LAB
D DIMER PPP FEU-MCNC: 0.44 MCGFEU/ML (ref 0–0.56)
NT-PROBNP SERPL-MCNC: 101.4 PG/ML (ref 5–1800)
TROPONIN I SERPL-MCNC: <0.01 NG/ML

## 2020-02-20 ENCOUNTER — TELEPHONE (OUTPATIENT)
Dept: PAIN MEDICINE | Facility: CLINIC | Age: 80
End: 2020-02-20

## 2020-03-09 ENCOUNTER — TELEPHONE (OUTPATIENT)
Dept: PAIN MEDICINE | Facility: CLINIC | Age: 80
End: 2020-03-09

## 2020-03-09 NOTE — TELEPHONE ENCOUNTER
Called pt to get information regarding records, diagnostics testing , treatments and imaging. No answer. No VM available.

## 2020-03-31 ENCOUNTER — TELEPHONE (OUTPATIENT)
Dept: PAIN MEDICINE | Facility: CLINIC | Age: 80
End: 2020-03-31

## 2020-05-20 RX ORDER — ROSUVASTATIN CALCIUM 40 MG/1
TABLET, COATED ORAL
Qty: 90 TABLET | Refills: 1 | Status: SHIPPED | OUTPATIENT
Start: 2020-05-20 | End: 2020-07-01

## 2020-05-26 NOTE — PROGRESS NOTES
"Chief Complaint: \"Pain in my lower back and in my legs.\"        History of Present Illness:   Patient: Ms. Daany Gonzales, 79 y.o. female   Referring Physician: Dr. Alejandra  Reason for Referral: Consultation for chronic intractable lower back pain.   Pain History: Patient reports a 30-year history of lower back pain, which began without incident. Patient denies any recent diagnostic studies.  Patient reports that she saw Dr. Tijerina about 18 years ago and had some injections with some relief. She has had injections every 3 months at [facility], last injection in April 2020. She has seen Dr Piedra, who apparently recommended injections every 3 months.  Patient is not aware of the type of injections that she has been receiving.  There are no records for review at this time..  Pain has progressed in intensity over the past 2 years.   Pain Description: Constant pain with intermittent exacerbation, described as excruciating sharp, dull, aching, sensation.   Radiation of Pain: The pain radiates from the gluteal region and onto the posterior aspect of her thigh and calves left more than right stopping at the ankle  Pain intensity today: 8/10  Average pain intensity last week: 8/10  Pain intensity ranges from: 8/10 to 9/10  Aggravating factors: Pain increases with standing longer than 5 minutes and ambulating more than 15 yards. Patient describes neurogenic claudication  Alleviating factors: Pain decreases with sitting and lying down.     Associated Symptoms:   Patient reports pain, numbness and weakness in the lower extremities.   Patient denies any new bladder or bowel problems.   Patient reports difficulties with her balance but denies falls.     Review of previous therapies and additional medical records:  Danay Gonzales has already failed the following measures, including:   Conservative Measures: Oral analgesics and physical therapy   Interventional Measures: >18 years ago with Dr Tijerina, and at [facility] every 3 months  Surgical " Measures: No history of previous lumbar spine surgery   Danay Gonzales underwent orthopedic spine consultation with Dr. Piedra and was found not to be a surgical candidate (as per patient).  Danay Gonzales presents with significant comorbidities including PVD, HTN, hyperlipedemia  In terms of current analgesics, Danay Gonzales takes: Lortab, Tylenol  I have reviewed [facility] Report #[id number] consistent with medication reconciliation.    Global Pain Scale 05-28  2020          Pain 20          Feelings 5          Clinical outcomes 21          Activities 21          GPS Total: 67            Review of Diagnostic Studies:    XR HIP W OR WO PELVIS 2-3 VIEW RIGHT- 07/16/2018: Degenerative changes. No acute osseous abnormality.       Review of Systems   Constitutional: Positive for activity change.   HENT: Positive for congestion, hearing loss, sinus pressure and sore throat.    Respiratory: Positive for cough and chest tightness.    Gastrointestinal: Positive for abdominal distention and constipation.   Musculoskeletal: Positive for arthralgias, back pain, gait problem, myalgias and neck stiffness.   Neurological: Positive for numbness.   Hematological: Bruises/bleeds easily.         Patient Active Problem List   Diagnosis   • PVD (peripheral vascular disease) (CMS/HCC)   • Lumbar stenosis with neurogenic claudication   • Spondylosis of lumbar region without myelopathy or radiculopathy   • Sacroiliac joint dysfunction of left side   • Gait disturbance   • At high risk for falls       Past Medical History:   Diagnosis Date   • Arthritis    • Cancer (CMS/HCC)     skin   • GERD (gastroesophageal reflux disease)    • Glaucoma    • Kidney disease    • Menopause    • Mumps    • Stroke (CMS/HCC)          Past Surgical History:   Procedure Laterality Date   • BREAST BIOPSY Bilateral     BOTH BREASTS US BX, PT DOES NOT REMEMBER WHEN   • HYSTERECTOMY     • OOPHORECTOMY           Family History   Problem Relation Age of Onset   •  Heart disease Mother    • Heart attack Mother    • Lung disease Father    • Heart disease Sister    • Cancer Sister    • Heart disease Brother    • Cancer Sister    • Cancer Brother    • Heart disease Brother    • Heart disease Brother    • Breast cancer Neg Hx    • Ovarian cancer Neg Hx          Social History     Socioeconomic History   • Marital status:      Spouse name: Not on file   • Number of children: Not on file   • Years of education: Not on file   • Highest education level: Not on file   Tobacco Use   • Smoking status: Never Smoker   • Smokeless tobacco: Never Used   Substance and Sexual Activity   • Alcohol use: No     Frequency: Never   • Drug use: No   • Sexual activity: Defer           Current Outpatient Medications:   •  acetaminophen (TYLENOL) 500 MG tablet, Take 500 mg by mouth 2 (Two) Times a Day., Disp: , Rfl:   •  aspirin  MG tablet, Take 325 mg by mouth Daily., Disp: , Rfl:   •  calcium carbonate (OS-YASMANY) 600 MG tablet, Take 600 mg by mouth Daily., Disp: , Rfl:   •  calcium citrate-vitamin d (CITRACAL) 200-250 MG-UNIT tablet tablet, Take  by mouth 2 (Two) Times a Day., Disp: , Rfl:   •  Cholecalciferol (VITAMIN D3) 125 MCG (5000 UT) capsule capsule, Take 5,000 Units by mouth Daily., Disp: , Rfl:   •  HYDROcodone-acetaminophen (NORCO) 5-325 MG per tablet, Take 1 tablet by mouth Every 6 (Six) Hours As Needed., Disp: , Rfl:   •  hyoscyamine sulfate (ANASPAZ) 0.125 MG tablet dispersible disintegrating tablet, Take  by mouth Daily., Disp: , Rfl:   •  levothyroxine (SYNTHROID, LEVOTHROID) 50 MCG tablet, Take 50 mcg by mouth Daily., Disp: , Rfl:   •  magnesium gluconate (MAGONATE) 500 MG tablet, Take 27 mg by mouth Daily., Disp: , Rfl:   •  Multiple Vitamin (MULTI-VITAMIN DAILY PO), Take  by mouth Daily., Disp: , Rfl:   •  omeprazole (priLOSEC) 40 MG capsule, Take 40 mg by mouth Daily., Disp: , Rfl:   •  ondansetron (ZOFRAN) 4 MG tablet, Take 4 mg by mouth Every 8 (Eight) Hours As Needed  "for Nausea or Vomiting., Disp: , Rfl:   •  potassium phosphate, monobasic, (K-PHOS) 500 MG tablet, Take 500 mg by mouth Daily., Disp: , Rfl:   •  rosuvastatin (CRESTOR) 40 MG tablet, TAKE 1 TABLET EVERY DAY, Disp: 90 tablet, Rfl: 1  •  triamterene-hydrochlorothiazide (DYAZIDE) 37.5-25 MG per capsule, Take 1 capsule by mouth Every Morning., Disp: , Rfl:   •  vitamin B-12 (CYANOCOBALAMIN) 1000 MCG tablet, Take 1,000 mcg by mouth Daily., Disp: , Rfl:       Allergies   Allergen Reactions   • Erythromycin Rash         /73   Pulse 78   Temp 98.6 °F (37 °C) (Temporal)   Resp 16   Ht 152.4 cm (60\")   Wt 53.4 kg (117 lb 12.8 oz)   SpO2 100%   BMI 23.01 kg/m²       Physical Exam:  Constitutional: Patient is oriented to person, place, and time.   Patient appears well-developed and well-nourished.   HEENT: Head: Normocephalic and atraumatic.   Eyes: Conjunctivae and lids are normal.   Pupils: Equal, round, reactive to light.   Neck: Trachea normal. Neck supple. No JVD present.   Peripheral vascular exam: Femoral: right 1+, left 1+. Posterior tibialis: right 1+ and left 1+. Dorsalis pedis: right 1+ and left 1+. No edema.   Musculoskeletal   Gait and station: Gait evaluation demonstrated shuffling   Lumbar spine: Passive and active range of motion are limited secondary to pain. Extension of the lumbar spine increased and reproduced pain. Lumbar facet joint loading maneuvers are positive.  Armando test, Gaenslen's test, thigh thrust test, SI compression test are positive on the left, negative on the right  Piriformis maneuvers are negative   Palpation of the bilateral ischial tuberosities, unrevealing   Palpation of the bilateral greater trochanter, unrevealing   Examination of the Iliotibial band: unrevealing   Hip joints: The range of motion of the hip joints is almost full and without pain   Neurological:   Patient is alert and oriented to person, place, and time.   Speech: speech is normal.   Cortical function: " Normal mental status.   Cranial nerves: Cranial nerves 2-12 intact.   Reflex Scores:  Right patellar: 1+  Left patellar: 1+  Right Achilles: 1+  Left Achilles: 1+  Motor strength: 5/5  Motor Tone: normal tone.   Involuntary movements: none.   Superficial/Primitive Reflexes: primitive reflexes were absent.   Right Baldwin: absent  Left Baldwin: absent  Right ankle clonus: absent  Left ankle clonus: absent   Babinsky: absent  Negative long tract signs. Straight leg raising test is negative. Femoral stretch sign is negative.   Sensation: No sensory loss. Sensory exam: intact to light touch, intact pain and temperature sensation, intact vibration sensation and normal proprioception.   Coordination: Normal finger to nose and heel to shin. Normal balance and negative Romberg's sign   Skin and subcutaneous tissue: Skin is warm and intact. No rash noted. No cyanosis.   Psychiatric: Judgment and insight: Normal. Orientation to person, place and time: Normal. Recent and remote memory: Intact. Mood and affect: Normal.     ASSESSMENT:   1. Lumbar stenosis with neurogenic claudication    2. Spondylosis of lumbar region without myelopathy or radiculopathy    3. Sacroiliac joint dysfunction of left side    4. PVD (peripheral vascular disease) (CMS/[facility])    5. Gait disturbance    6. At high risk for falls          PLAN/MEDICAL DECISION MAKING:  Patient reports a 30-year history of lower back pain refractory to conservative measures.  Patient reports that over the past few years, her pain and difficulties with ambulation have increased significantly. She has been receiving injections at [facility] every 3 months with some relief.  Patient denies any recent diagnostic studies, other than a plain x-ray obtained at [facility] a few years back.  I have reviewed all available patient's medical records as well as previous therapies as referenced above.  Patient needs a complete work-up. Clinically, she presents with  lumbar spinal stenosis with neurogenic claudication.  She also has a history of peripheral arterial disease and thus vascular claudication could be superimposed to her lumbar issues.  I had a lengthy conversation with Ms. Danay Gonzales regarding her chronic pain condition and potential therapeutic options including risks, benefits, alternative therapies, to name a few. Therefore, I have proposed the following plan:  1. Diagnostic studies:  A. MRI of the lumbar spine without contrast  B. Flexion and extension x-rays of the lumbar spine  C. Arterial duplex Doppler of the lower extremities with ABIs  D. EMG/NCV of the bilateral lower extremities   2. Pharmacological measures: Reviewed. Discussed. Patient takes Lortab on an as-needed basis. Patient has declined additional pharmacological measures   3. Interventional pain management measures: We will obtain diagnostic studies first.  I suspect lumbar spinal stenosis with neurogenic claudication and therefore, will anticipate transforaminal epidural steroid injections versus neurosurgical consultation versus spinal cord stimulation.  Patient also presents with significant left sacroiliac joint dysfunction  4. Long-term rehabilitation efforts:  A. The patient does not have a history of falls. I did complete a risk assessment for falls. Fall precautions: Patient has been instructed regarding universal fall precautions, such as;   · Using gait aids a cane or a rolling walker at the appropriate height at all times for ambulation   · Removing all area rugs and coffee tables to create a safe environment at home  · Ensure clean, dry floors  · Wearing supportive footwear and properly fitting clothing  · Ensure bed/chair is appropriate height and patient's feet can touch the floor  · Using a shower transfer bench  · Using walk-in shower and having shower safety bars installed  · Ensure proper lighting, minimize glare  · Have nightlights operational and in use  · Participation in  an exercise program for gait training, balance training and strength  · Avoid carrying laundry up and down steps  · Ensure proper compliance and organization of medications to avoid errors   · Avoid use of over the counter sedatives and alcohol consumption  · Ensure easy access to call bell, glasses, TV control, telephone  · Ensure glasses/hearing aids are in use or close by (on top of night table)  B. Patient will start a comprehensive physical therapy program at [facility] with Dr. Hayder Weston for therapeutic exercise, core strengthening, gait and balance training, neurodynamics, myofascial release, cupping, dry needling and Alter-G   C. Contrast therapy: Apply ice-packs for 15-20 minutes, followed by heating pads for 15-20 minutes to affected area   5. The patient and her family have been instructed to contact my office with any questions or difficulties. The patient understands the plan and agrees to proceed accordingly.      Patient Care Team:  DEANN Alejandra MD as PCP - General  DEANN Alejandra MD as PCP - Family Medicine     No orders of the defined types were placed in this encounter.        Future Appointments   Date Time Provider Department Center   7/1/2020  3:30 PM Neal Abernathy III, MD [facility] None         Jalil Williamson MD     EMR Dragon/Transcription disclaimer:  Much of this encounter note is an electronic transcription of spoken language to printed text. Electronic transcription of spoken language may permit erroneous, or at times, nonsensical words or phrases to be inadvertently transcribed. Although I have reviewed the note for such errors, some may still exist.

## 2020-05-28 ENCOUNTER — OFFICE VISIT (OUTPATIENT)
Dept: PAIN MEDICINE | Facility: CLINIC | Age: 80
End: 2020-05-28

## 2020-05-28 VITALS
DIASTOLIC BLOOD PRESSURE: 73 MMHG | TEMPERATURE: 98.6 F | SYSTOLIC BLOOD PRESSURE: 153 MMHG | RESPIRATION RATE: 16 BRPM | OXYGEN SATURATION: 100 % | BODY MASS INDEX: 23.13 KG/M2 | WEIGHT: 117.8 LBS | HEIGHT: 60 IN | HEART RATE: 78 BPM

## 2020-05-28 DIAGNOSIS — I73.9 PVD (PERIPHERAL VASCULAR DISEASE) (HCC): ICD-10-CM

## 2020-05-28 DIAGNOSIS — M48.062 LUMBAR STENOSIS WITH NEUROGENIC CLAUDICATION: ICD-10-CM

## 2020-05-28 DIAGNOSIS — Z91.81 AT HIGH RISK FOR FALLS: ICD-10-CM

## 2020-05-28 DIAGNOSIS — I73.9 PVD (PERIPHERAL VASCULAR DISEASE) (HCC): Primary | ICD-10-CM

## 2020-05-28 DIAGNOSIS — M47.816 SPONDYLOSIS OF LUMBAR REGION WITHOUT MYELOPATHY OR RADICULOPATHY: ICD-10-CM

## 2020-05-28 DIAGNOSIS — R26.9 GAIT DISTURBANCE: ICD-10-CM

## 2020-05-28 DIAGNOSIS — M53.3 SACROILIAC JOINT DYSFUNCTION OF LEFT SIDE: ICD-10-CM

## 2020-05-28 PROCEDURE — 99204 OFFICE O/P NEW MOD 45 MIN: CPT | Performed by: ANESTHESIOLOGY

## 2020-05-28 RX ORDER — MAGNESIUM GLUCONATE 27 MG(500)
500 TABLET ORAL DAILY
COMMUNITY

## 2020-05-28 RX ORDER — ONDANSETRON 4 MG/1
4 TABLET, FILM COATED ORAL EVERY 8 HOURS PRN
COMMUNITY

## 2020-05-28 RX ORDER — PHENOL 1.4 %
600 AEROSOL, SPRAY (ML) MUCOUS MEMBRANE DAILY
COMMUNITY

## 2020-06-16 DIAGNOSIS — I73.9 PVD (PERIPHERAL VASCULAR DISEASE) (HCC): Primary | ICD-10-CM

## 2020-07-01 ENCOUNTER — OFFICE VISIT (OUTPATIENT)
Dept: CARDIOLOGY | Facility: CLINIC | Age: 80
End: 2020-07-01

## 2020-07-01 VITALS
DIASTOLIC BLOOD PRESSURE: 58 MMHG | WEIGHT: 115 LBS | HEIGHT: 60 IN | HEART RATE: 71 BPM | BODY MASS INDEX: 22.58 KG/M2 | SYSTOLIC BLOOD PRESSURE: 132 MMHG | OXYGEN SATURATION: 98 % | TEMPERATURE: 97.5 F

## 2020-07-01 DIAGNOSIS — I73.9 PVD (PERIPHERAL VASCULAR DISEASE) (HCC): Primary | ICD-10-CM

## 2020-07-01 PROCEDURE — 99213 OFFICE O/P EST LOW 20 MIN: CPT | Performed by: INTERNAL MEDICINE

## 2020-07-01 RX ORDER — CALCITRIOL 0.25 UG/1
0.25 CAPSULE, LIQUID FILLED ORAL AS NEEDED
COMMUNITY
End: 2022-08-18

## 2020-07-01 RX ORDER — ROSUVASTATIN CALCIUM 40 MG/1
40 TABLET, COATED ORAL DAILY
COMMUNITY
End: 2023-01-30 | Stop reason: SDUPTHER

## 2020-07-01 NOTE — PROGRESS NOTES
[facility] at [facility]  Office visit  Danay Gonzales  1940    There is no work phone number on file.    VISIT DATE:  7/1/2020      PCP: DEANN Alejandra MD  [address] [address]    CC:  Chief Complaint   Patient presents with   • PVD (peripheral vascular disease) (CMS/HCC)       Previous cardiac studies and procedures:  MRA head and neck 2014  1. No acute intracranial process.   2. Focal area of 50% stenosis in the proximal and/or M1 portion of the  left middle cerebral artery.   3. Normal neck MRA.    January 2019 carotid duplex  · Proximal right internal carotid artery plaque without significant stenosis.  · Right internal carotid artery stenosis of 0-49%.  · Proximal left internal carotid artery plaque without significant stenosis.  · Left internal carotid artery stenosis of 0-49%.  · Incidental finding: left sided fluid filled cyst measuring (1.3 X 1.3 ) adjacent to left common carotid.  · Blunted and delayed waveforms throughout left common carotid, external carotid, and internal carotid arteries suggestive of significant proximal disease. Consider CTA for further evaluation.    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    ASSESSMENT:   Diagnosis Plan   1. PVD (peripheral vascular disease) (CMS/HCC)         PLAN:  Left internal carotid artery stenosis: Greater than 90% ostial stenosis.  Imaging consistent with soft plaque.  Currently asymptomatic.  Anatomic variant regarding origins of the great vessels from the aortic arch increase the technical challenge of any potential percutaneous intervention.  Recommending continued aggressive medical therapy as long as she is  "asymptomatic.  Continue aspirin and afterload reduction.  Goal blood pressure less than 130/80 mmHg.  Continue rosuvastatin to 20 mg p.o. daily, goal LDL less than 70.    Intracranial atherosclerotic disease: Currently symptomatic.  Continue aggressive risk factor modification.    Hyperlipidemia: Goal LDL less than 70, currently well controlled.  Moderate hypertriglyceridemia on most recent fasting lipid panel available for review.  We will continue to trend and consider addition of pharmacologic therapy to treat residual cardiovascular risk if it remains elevated.    Subjective  Most of her limitations are due to low back pain.  Has MRI lumbar spine pending.  Pain management also checking bilateral lower extremity arterial duplex to rule out peripheral vascular disease presenting as atypical claudication.  She is compliant with medical therapy.  No limiting dyspnea on exertion.  No exertional chest discomfort.    PHYSICAL EXAMINATION:  Vitals:    07/01/20 1547   BP: 132/58   BP Location: Left arm   Patient Position: Sitting   Pulse: 71   Temp: 97.5 °F (36.4 °C)   SpO2: 98%   Weight: 52.2 kg (115 lb)   Height: 152.4 cm (60\")     General Appearance:    Alert, cooperative, no distress, appears stated age   Head:    Normocephalic, without obvious abnormality, atraumatic   Eyes:    conjunctiva/corneas clear   Nose:   Nares normal, septum midline, mucosa normal, no drainage   Throat:   Lips, teeth and gums normal   Neck:   Supple, symmetrical, trachea midline, no carotid    bruit or JVD   Lungs:     Clear to auscultation bilaterally, respirations unlabored   Chest Wall:    No tenderness or deformity    Heart:    Regular rate and rhythm, S1 and S2 normal, 2/6 early peaking systolic murmur right upper sternal border, rub   or gallop, bilateral carotid bruit, left greater than right.   Abdomen:     Soft, non-tender   Extremities:   Extremities normal, atraumatic, no cyanosis or edema   Pulses:   2+ and symmetric all " extremities   Skin:   Skin color, texture, turgor normal, no rashes or lesions       Diagnostic Data:  Procedures  Lab Results   Component Value Date    TRIG 189 (H) 03/22/2019    HDL 32 (L) 03/22/2019     Lab Results   Component Value Date    GLUCOSE 137 (H) 06/07/2014    BUN 19 06/07/2014    CREATININE 1.50 (H) 03/22/2019     06/07/2014    K 4.3 06/07/2014     06/07/2014    CO2 26 06/07/2014     No results found for: HGBA1C  Lab Results   Component Value Date    WBC 8.68 06/07/2014    HGB 13.1 06/07/2014    HCT 40.6 06/07/2014     06/07/2014       Allergies  Allergies   Allergen Reactions   • Erythromycin Rash       Current Medications    Current Outpatient Medications:   •  acetaminophen (TYLENOL) 500 MG tablet, Take 500 mg by mouth Every 6 (Six) Hours As Needed., Disp: , Rfl:   •  aspirin  MG tablet, Take 325 mg by mouth Daily., Disp: , Rfl:   •  calcitriol (ROCALTROL) 0.25 MCG capsule, Take 0.25 mcg by mouth 2 (Two) Times a Week., Disp: , Rfl:   •  calcium carbonate (OS-YASMANY) 600 MG tablet, Take 600 mg by mouth Daily., Disp: , Rfl:   •  Cholecalciferol (VITAMIN D3) 125 MCG (5000 UT) capsule capsule, Take 5,000 Units by mouth Daily., Disp: , Rfl:   •  HYDROcodone-acetaminophen (NORCO) 5-325 MG per tablet, Take 1 tablet by mouth Daily., Disp: , Rfl:   •  levothyroxine (SYNTHROID, LEVOTHROID) 50 MCG tablet, Take 50 mcg by mouth Daily., Disp: , Rfl:   •  magnesium gluconate (MAGONATE) 500 MG tablet, Take 27 mg by mouth Daily., Disp: , Rfl:   •  Multiple Vitamin (MULTI-VITAMIN DAILY PO), Take  by mouth Daily., Disp: , Rfl:   •  omeprazole (priLOSEC) 40 MG capsule, Take 40 mg by mouth Daily., Disp: , Rfl:   •  ondansetron (ZOFRAN) 4 MG tablet, Take 4 mg by mouth Every 8 (Eight) Hours As Needed for Nausea or Vomiting., Disp: , Rfl:   •  potassium phosphate, monobasic, (K-PHOS) 500 MG tablet, Take 500 mg by mouth Daily., Disp: , Rfl:   •  rosuvastatin (CRESTOR) 20 MG tablet, Take 20 mg by mouth  Daily., Disp: , Rfl:   •  triamterene-hydrochlorothiazide (DYAZIDE) 37.5-25 MG per capsule, Take 1 capsule by mouth Every Morning., Disp: , Rfl:   •  vitamin B-12 (CYANOCOBALAMIN) 1000 MCG tablet, Take 1,000 mcg by mouth Daily., Disp: , Rfl:           ROS  Review of Systems   Cardiovascular: Positive for dyspnea on exertion. Negative for chest pain and irregular heartbeat.   Respiratory: Positive for cough and shortness of breath.        SOCIAL HX  Social History     Socioeconomic History   • Marital status:      Spouse name: Not on file   • Number of children: Not on file   • Years of education: Not on file   • Highest education level: Not on file   Tobacco Use   • Smoking status: Never Smoker   • Smokeless tobacco: Never Used   Substance and Sexual Activity   • Alcohol use: No     Frequency: Never   • Drug use: No   • Sexual activity: Defer       FAMILY HX  Family History   Problem Relation Age of Onset   • Heart disease Mother    • Heart attack Mother    • Lung disease Father    • Heart disease Sister    • Cancer Sister    • Heart disease Brother    • Cancer Sister    • Cancer Brother    • Heart disease Brother    • Heart disease Brother    • Breast cancer Neg Hx    • Ovarian cancer Neg Hx              Neal Abernathy III, MD, FACC

## 2020-07-13 ENCOUNTER — HOSPITAL ENCOUNTER (OUTPATIENT)
Dept: CARDIOLOGY | Facility: HOSPITAL | Age: 80
Discharge: HOME | End: 2020-07-13
Admitting: ANESTHESIOLOGY

## 2020-07-13 VITALS — BODY MASS INDEX: 22.59 KG/M2 | HEIGHT: 60 IN | WEIGHT: 115.08 LBS

## 2020-07-13 DIAGNOSIS — I73.9 PVD (PERIPHERAL VASCULAR DISEASE) (HCC): ICD-10-CM

## 2020-07-13 LAB
BH CV LOWER ARTERIAL LEFT ABI RATIO: 0.99
BH CV LOWER ARTERIAL LEFT DORSALIS PEDIS SYS MAX: 125 MMHG
BH CV LOWER ARTERIAL LEFT POST TIBIAL SYS MAX: 141 MMHG
BH CV LOWER ARTERIAL RIGHT ABI RATIO: 0.98
BH CV LOWER ARTERIAL RIGHT DORSALIS PEDIS SYS MAX: 136 MMHG
BH CV LOWER ARTERIAL RIGHT POST TIBIAL SYS MAX: 140 MMHG
UPPER ARTERIAL LEFT ARM BRACHIAL SYS MAX: 143 MMHG
UPPER ARTERIAL RIGHT ARM BRACHIAL SYS MAX: 136 MMHG

## 2020-07-13 PROCEDURE — 93923 UPR/LXTR ART STDY 3+ LVLS: CPT

## 2020-07-13 PROCEDURE — 93923 UPR/LXTR ART STDY 3+ LVLS: CPT | Performed by: INTERNAL MEDICINE

## 2020-07-20 ENCOUNTER — HOSPITAL ENCOUNTER (OUTPATIENT)
Dept: MRI IMAGING | Facility: HOSPITAL | Age: 80
Discharge: HOME | End: 2020-07-20

## 2020-07-20 ENCOUNTER — HOSPITAL ENCOUNTER (OUTPATIENT)
Dept: NEUROLOGY | Facility: HOSPITAL | Age: 80
Discharge: HOME | End: 2020-07-20
Admitting: ANESTHESIOLOGY

## 2020-07-20 ENCOUNTER — HOSPITAL ENCOUNTER (OUTPATIENT)
Dept: GENERAL RADIOLOGY | Facility: HOSPITAL | Age: 80
Discharge: HOME | End: 2020-07-20

## 2020-07-20 DIAGNOSIS — Z91.81 AT HIGH RISK FOR FALLS: ICD-10-CM

## 2020-07-20 DIAGNOSIS — M48.062 LUMBAR STENOSIS WITH NEUROGENIC CLAUDICATION: ICD-10-CM

## 2020-07-20 DIAGNOSIS — M53.3 SACROILIAC JOINT DYSFUNCTION OF LEFT SIDE: ICD-10-CM

## 2020-07-20 DIAGNOSIS — I73.9 PVD (PERIPHERAL VASCULAR DISEASE) (HCC): ICD-10-CM

## 2020-07-20 DIAGNOSIS — R26.9 GAIT DISTURBANCE: ICD-10-CM

## 2020-07-20 DIAGNOSIS — M47.816 SPONDYLOSIS OF LUMBAR REGION WITHOUT MYELOPATHY OR RADICULOPATHY: ICD-10-CM

## 2020-07-20 PROCEDURE — 95910 NRV CNDJ TEST 7-8 STUDIES: CPT

## 2020-07-20 PROCEDURE — 95886 MUSC TEST DONE W/N TEST COMP: CPT

## 2020-07-20 PROCEDURE — 72148 MRI LUMBAR SPINE W/O DYE: CPT

## 2020-07-20 PROCEDURE — 72114 X-RAY EXAM L-S SPINE BENDING: CPT

## 2020-07-23 PROBLEM — M51.379 DEGENERATION OF LUMBAR OR LUMBOSACRAL INTERVERTEBRAL DISC: Status: ACTIVE | Noted: 2020-07-23

## 2020-07-23 PROBLEM — M51.37 DEGENERATION OF LUMBAR OR LUMBOSACRAL INTERVERTEBRAL DISC: Status: ACTIVE | Noted: 2020-07-23

## 2020-08-06 DIAGNOSIS — M48.062 LUMBAR STENOSIS WITH NEUROGENIC CLAUDICATION: Primary | ICD-10-CM

## 2020-08-12 ENCOUNTER — TELEPHONE (OUTPATIENT)
Dept: PAIN MEDICINE | Facility: CLINIC | Age: 80
End: 2020-08-12

## 2020-08-12 NOTE — TELEPHONE ENCOUNTER
Received two voicemails from pt, no information given.  Tried to call pt back, no answer. No option to leave voicemail.

## 2020-09-23 NOTE — PROGRESS NOTES
"Chief Complaint: \"Pain in the lower back and in my legs. I was doing okay with physical therapy until he got shingles.\"      History of Present Illness:  Ms. Danay Gonzales, 80 y.o. female originally referred by Dr. Tray Alejandra in consultation for chronic intractable lower back pain.   Pain History: Patient reports a 30-year history of lower back pain, which began without incident.  Patient reports that about 18 years ago she saw Dr. Chester Tijerina and underwent some injections with relief.  More recently, she has been having injections every 3 months at [facility].  Her last injection was in May 2020.  Patient reports that after each of those procedures she experienced about a month of discrete pain relief.  When I saw her for her initial visit in May, she did not have any diagnostic studies for review.  Since then, she underwent a comprehensive diagnostic work-up including MRI of the lumbar spine, flexion-extension films of the lumbar spine, arterial duplex Doppler of the lower extremities, and electrodiagnostic studies.  In addition, she underwent recent psychological consultation with Dr. Eliazra Perkins who has found her to be an Appropriate Candidate for Spinal Cord Stimulation.  Patient comes to the clinic today to discuss prospects of her spinal cord stimulator trial.  She denies any significant change in her medical history other than a recent outbreak of zoster's, which has resolved at this time.  Prior to her shingles, she was participating in a physical therapy program with minimal functional improvement.  Patient was treated with valacyclovir and steroids.  Pain Description: Constant pain with intermittent exacerbation, described as excruciating sharp, dull, aching, sensation.   Radiation of Pain: The pain radiates from the gluteal region onto the posterior aspect of her thighs and calves stopping at the ankle  Pain intensity today: 5/10  Average pain intensity last week: 7/10  Pain intensity " "ranges from: 5/10 to 9/10  Aggravating factors: Pain increases with standing longer than 5 minutes and ambulating more than 15 yards. Patient describes severe neurogenic claudication  Alleviating factors: Pain decreases with sitting and lying down.   Associated Symptoms:   Patient reports pain, numbness and weakness in the lower extremities.   Patient denies any new bladder or bowel problems.   Patient reports difficulties with her balance but denies falls.     Review of previous therapies and additional medical records:  Danay Gonzales has already failed the following measures, including:   Conservative Measures: Oral analgesics and physical therapy   Interventional Measures: >18 years ago with Dr Tijerina, and at [facility] every 3 months  Surgical Measures: No history of previous lumbar spine surgery   Danay Gonzales underwent orthopedic spine consultation with Dr. Piedra and was found not to be a surgical candidate (as per patient).  Patient underwent psychological consultation with Dr. Eliazar Perkins on September 2, 2020: \"From a psychological perspective, patient is considered to be an appropriate candidate for a spinal cord stimulator at this time.\"  Danay Gonzales presents with significant comorbidities including PVD, HTN, hyperlipedemia  In terms of current analgesics, Danay Gonzales takes: Lortab QAM, Tylenol, aspirin  I have reviewed [facility] Report #[id number] with medication reconciliation.    Global Pain Scale 05-28 2020 09-24  020         Pain 20 15         Feelings 5 5         Clinical outcomes 21 15         Activities 21 15         GPS Total: 67 50           Review of Diagnostic Studies:    MRI of the lumbar spine without contrast 07/20/2020.  No compression deformity or focal subluxation is seen. No vertebral  marrow edema is identified. There are disc protrusions at each level in the lumbar spine, as well as associated facet DJD and potentially significant spinal canal narrowing at L2-L3, L3-4, " L4-5 and L5-S1. Tip of conus medullaris lies at L1. Axial images: T12-L1, L1-L2: No evidence of significant canal or foraminal stenosis  L2-L3: Canal appears borderline stenotic. Right neural foramen appears normal. Left neural foramen appears moderately narrowed.  L3-L4: Broad-based disc protrusion and extensive posterior element hypertrophy with marked canal stenosis and complete effacement of CSF. Left neural foramen appears severely stenotic. There is moderately narrowed right neural foramen.  L4-L5: Right paracentral disc herniation with right lateral recess stenosis and marked canal stenosis. Foramina appear normal on the left and moderately narrowed on the right.  L5-S1: Moderate canal stenosis due to broad-based disc protrusion and posterior element hypertrophy. There is moderate  right-sided foraminal narrowing and milder left-sided foraminal narrowing.  Flexion and extension x-rays of the lumbar spine 07/20/2020: Stable alignment in flexion and extension views with  scoliotic curvature with convexity to the right. Disc space narrowing at the L2/L3 and L3/L4 levels.  Arterial duplex Doppler of the lower extremities with ABIs 07/13/2020  Right Conclusion: The right SHAJI is normal.  Left Conclusion: The left SHAJI is normal  EMG/NCV of the bilateral lower extremities 07/20/2020: Mild peripheral neuropathy. Mild left peroneal neuropathy at knee. Chronic bilateral S1 radiculopathy   XR HIP W OR WO PELVIS 2-3 VIEW RIGHT- 07/16/2018: Degenerative changes. No acute osseous abnormality.     Review of Systems   Constitutional: Positive for activity change.   HENT: Positive for congestion, hearing loss, sinus pressure and sore throat.    Respiratory: Positive for cough and chest tightness.    Gastrointestinal: Positive for abdominal distention and constipation.   Musculoskeletal: Positive for arthralgias, back pain, gait problem, myalgias and neck stiffness.   Neurological: Positive for numbness.   Hematological:  Bruises/bleeds easily.         Patient Active Problem List   Diagnosis   • PVD (peripheral vascular disease) (CMS/HCC)   • Lumbar stenosis with neurogenic claudication   • Spondylosis of lumbar region without myelopathy or radiculopathy   • Sacroiliac joint dysfunction of left side   • Gait disturbance   • At high risk for falls   • Degeneration of lumbar or lumbosacral intervertebral disc       Past Medical History:   Diagnosis Date   • Arthritis    • Cancer (CMS/HCC)     skin   • GERD (gastroesophageal reflux disease)    • Glaucoma    • Kidney disease    • Lumbar stenosis    • Menopause    • Mumps    • PVD (peripheral vascular disease) (CMS/HCC)    • Stroke (CMS/HCC)          Past Surgical History:   Procedure Laterality Date   • BREAST BIOPSY Bilateral     BOTH BREASTS US BX, PT DOES NOT REMEMBER WHEN   • HYSTERECTOMY     • OOPHORECTOMY           Family History   Problem Relation Age of Onset   • Heart disease Mother    • Heart attack Mother    • Lung disease Father    • Heart disease Sister    • Cancer Sister    • Heart disease Brother    • Cancer Sister    • Cancer Brother    • Heart disease Brother    • Heart disease Brother    • Breast cancer Neg Hx    • Ovarian cancer Neg Hx          Social History     Socioeconomic History   • Marital status:      Spouse name: Not on file   • Number of children: Not on file   • Years of education: Not on file   • Highest education level: Not on file   Tobacco Use   • Smoking status: Never Smoker   • Smokeless tobacco: Never Used   Substance and Sexual Activity   • Alcohol use: No     Frequency: Never   • Drug use: No   • Sexual activity: Defer           Current Outpatient Medications:   •  acetaminophen (TYLENOL) 500 MG tablet, Take 500 mg by mouth Every 6 (Six) Hours As Needed., Disp: , Rfl:   •  aspirin  MG tablet, Take 325 mg by mouth Daily., Disp: , Rfl:   •  calcitriol (ROCALTROL) 0.25 MCG capsule, Take 0.25 mcg by mouth 2 (Two) Times a Week., Disp: , Rfl:  "  •  calcium carbonate (OS-YASMANY) 600 MG tablet, Take 600 mg by mouth Daily., Disp: , Rfl:   •  Cholecalciferol (VITAMIN D3) 125 MCG (5000 UT) capsule capsule, Take 5,000 Units by mouth Daily., Disp: , Rfl:   •  HYDROcodone-acetaminophen (NORCO) 5-325 MG per tablet, Take 1 tablet by mouth Daily., Disp: , Rfl:   •  levothyroxine (SYNTHROID, LEVOTHROID) 50 MCG tablet, Take 50 mcg by mouth Daily., Disp: , Rfl:   •  magnesium gluconate (MAGONATE) 500 MG tablet, Take 27 mg by mouth Daily., Disp: , Rfl:   •  Multiple Vitamin (MULTI-VITAMIN DAILY PO), Take  by mouth Daily., Disp: , Rfl:   •  omeprazole (priLOSEC) 40 MG capsule, Take 40 mg by mouth Daily., Disp: , Rfl:   •  ondansetron (ZOFRAN) 4 MG tablet, Take 4 mg by mouth Every 8 (Eight) Hours As Needed for Nausea or Vomiting., Disp: , Rfl:   •  potassium phosphate, monobasic, (K-PHOS) 500 MG tablet, Take 500 mg by mouth Daily., Disp: , Rfl:   •  rosuvastatin (CRESTOR) 20 MG tablet, Take 20 mg by mouth Daily., Disp: , Rfl:   •  triamterene-hydrochlorothiazide (DYAZIDE) 37.5-25 MG per capsule, Take 1 capsule by mouth Every Morning., Disp: , Rfl:   •  vitamin B-12 (CYANOCOBALAMIN) 1000 MCG tablet, Take 1,000 mcg by mouth Daily., Disp: , Rfl:       Allergies   Allergen Reactions   • Erythromycin Rash         Ht 152.4 cm (60\")   Wt 52.6 kg (116 lb)   BMI 22.65 kg/m²       Physical Exam: A physical examination was performed today, changes are noted as follows;  Constitutional: Patient appears well-developed and well-nourished.   HEENT: Head: Normocephalic and atraumatic.   Eyes: Conjunctivae and lids are normal.   Pupils: Equal, round, reactive to light.   Neck: Trachea normal. Neck supple. No JVD present.   Peripheral vascular exam: Femoral: right 1+, left 1+. Posterior tibialis: right 1+ and left 1+. Dorsalis pedis: right 1+ and left 1+. No edema.   Musculoskeletal   Gait and station: Gait evaluation demonstrated shuffling   Lumbar spine: Passive and active range of motion " are limited secondary to pain. Extension of the lumbar spine increased and reproduced pain. Lumbar facet joint loading maneuvers are positive.  Armando test, Gaenslen's test, thigh thrust test, SI compression test are positive on the left, negative on the right  Piriformis maneuvers are negative   Palpation of the bilateral ischial tuberosities, unrevealing   Palpation of the bilateral greater trochanter, unrevealing   Examination of the Iliotibial band: unrevealing   Hip joints: The range of motion of the hip joints is almost full and without pain   Neurological:   Patient is alert and oriented to person, place, and time.   Speech: speech is normal.   Cortical function: Normal mental status.   Cranial nerves: Cranial nerves 2-12 intact.   Reflex Scores:  Right patellar: 1+  Left patellar: 1+  Right Achilles: 1+  Left Achilles: 1+  Motor strength: 5/5  Motor Tone: normal tone.   Involuntary movements: none.   Superficial/Primitive Reflexes: primitive reflexes were absent.   Right Baldwin: absent  Left Baldwin: absent  Right ankle clonus: absent  Left ankle clonus: absent   Babinsky: absent  Negative long tract signs. Straight leg raising test is negative. Femoral stretch sign is negative  Sensation: No sensory loss. Sensory exam: intact to light touch, intact pain and temperature sensation, intact vibration sensation and  Normal proprioception  Coordination: Normal finger to nose and heel to shin. Normal balance and negative Romberg's sign   Skin and subcutaneous tissue: Skin is warm and intact. No rash noted. No cyanosis.   Psychiatric: Judgment and insight: Normal. Orientation to person, place and time: Normal. Recent and remote memory: Intact. Mood and affect: Normal.     ASSESSMENT:   1. Lumbar stenosis with neurogenic claudication    2. Spondylosis of lumbar region without myelopathy or radiculopathy    3. Degeneration of lumbar or lumbosacral intervertebral disc    4. Sacroiliac joint dysfunction of left side     5. PVD (peripheral vascular disease) (CMS/[facility])    6. At high risk for falls    7. Gait disturbance        PLAN/MEDICAL DECISION MAKING: Patient presents with a 30-year history of chronic lower back pain refractory to conservative and previous interventional pain management measures, as referenced in the HPI and on previous notes.  Over the past few years, patient started noticing more difficulties with pain and ambulation.  Patient reports that she has been receiving epidural steroid injections [facility] orthopedics with a frequency of every 3 months with partial relief that lasted for a few weeks each time.  Patient underwent comprehensive diagnostic work-up including MRI of the lumbar spine, flexion-extension x-rays, vascular studies of the lower extremities and electrodiagnostic studies of the lower extremities.  Patient presents with multilevel/multifactorial lumbar spinal canal and foraminal stenosis.  There is significant lumbar scoliosis.  EMG/NCV of the lower extremities revealed chronic bilateral S1 radiculopathies and mild peripheral polyneuropathy. Flexion-extension x-rays of the lumbar spine revealed stable alignment.  Arterial duplex Doppler of the lower extremities revealed normal ABIs.  Patient presents with lumbar spinal stenosis with neurogenic claudication.  She also has a history of peripheral vascular disease with likely some vascular claudication superimposed to her lumbar issues.  Patient underwent psychological consultation with Dr. Eliazar Perkins and has been found to be an appropriate candidate for spinal cord stimulation from a psychological standpoint.  I had a lengthy conversation with Mrs. Gonzales regarding her cramping condition and potential therapeutic options including risks, benefits, surgical options, alternative therapies, to name a few.  Therefore, the proposed the following plan:  1. Interventional pain management measures: Patient would like to move forward with a spinal cord  stimulator trial.  Patient will scheduled for a spinal cord stimulator trial with [facility]. I have discussed the risks, benefits, technical aspects, short and long-term goals of the spinal cord stimulator trial and implant.  Patient has already reviewed some didactic information regarding neuromodulation techniques.  2. Diagnostic studies: CBC, PT, PTT, COVID test prior to spinal cord stimulator trial  3. Pharmacological measures: Reviewed. Discussed. Patient takes Lortab on an as-needed basis. Patient has declined additional pharmacological measures   4. Long-term rehabilitation efforts:  A. The patient does not have a history of falls. I did complete a risk assessment for falls. Fall precautions: Patient has been instructed regarding universal fall precautions, such as;   · Using gait aids a cane or a rolling walker at the appropriate height at all times for ambulation   · Removing all area rugs and coffee tables to create a safe environment at home  · Ensure clean, dry floors  · Wearing supportive footwear and properly fitting clothing  · Ensure bed/chair is appropriate height and patient's feet can touch the floor  · Using a shower transfer bench  · Using walk-in shower and having shower safety bars installed  · Ensure proper lighting, minimize glare  · Have nightlights operational and in use  · Participation in an exercise program for gait training, balance training and strength  · Avoid carrying laundry up and down steps  · Ensure proper compliance and organization of medications to avoid errors   · Avoid use of over the counter sedatives and alcohol consumption  · Ensure easy access to call bell, glasses, TV control, telephone  · Ensure glasses/hearing aids are in use or close by (on top of night table)  B.  Patient will start a comprehensive physical therapy program at [facility] with Dr. Hayder Weston for therapeutic exercise, core strengthening, gait and balance training, neurodynamics, myofascial release,  cupping, dry needling and Alter-G once pain is under control  C. Contrast therapy: Apply ice-packs for 15-20 minutes, followed by heating pads for 15-20 minutes to affected area   5. The patient and her family have been instructed to contact my office with any questions or difficulties. The patient understands the plan and agrees to proceed accordingly.      Patient Care Team:  DEANN Alejandra MD as PCP - General  DEANN Alejandra MD as PCP - Family Medicine     No orders of the defined types were placed in this encounter.        Future Appointments   Date Time Provider Department Center   1/11/2021  3:30 PM Neal Abernathy III, MD [facility] None         Jalil Williamson MD     EMR Dragon/Transcription disclaimer:  Much of this encounter note is an electronic transcription of spoken language to printed text. Electronic transcription of spoken language may permit erroneous, or at times, nonsensical words or phrases to be inadvertently transcribed. Although I have reviewed the note for such errors, some may still exist.

## 2020-09-24 ENCOUNTER — OFFICE VISIT (OUTPATIENT)
Dept: PAIN MEDICINE | Facility: CLINIC | Age: 80
End: 2020-09-24

## 2020-09-24 VITALS — HEIGHT: 60 IN | WEIGHT: 116 LBS | BODY MASS INDEX: 22.78 KG/M2

## 2020-09-24 DIAGNOSIS — I73.9 PVD (PERIPHERAL VASCULAR DISEASE) (HCC): ICD-10-CM

## 2020-09-24 DIAGNOSIS — Z91.81 AT HIGH RISK FOR FALLS: ICD-10-CM

## 2020-09-24 DIAGNOSIS — M51.37 DEGENERATION OF LUMBAR OR LUMBOSACRAL INTERVERTEBRAL DISC: ICD-10-CM

## 2020-09-24 DIAGNOSIS — Z01.818 PREOPERATIVE TESTING: Primary | ICD-10-CM

## 2020-09-24 DIAGNOSIS — M48.062 LUMBAR STENOSIS WITH NEUROGENIC CLAUDICATION: ICD-10-CM

## 2020-09-24 DIAGNOSIS — M53.3 SACROILIAC JOINT DYSFUNCTION OF LEFT SIDE: ICD-10-CM

## 2020-09-24 DIAGNOSIS — M47.816 SPONDYLOSIS OF LUMBAR REGION WITHOUT MYELOPATHY OR RADICULOPATHY: ICD-10-CM

## 2020-09-24 DIAGNOSIS — R26.9 GAIT DISTURBANCE: ICD-10-CM

## 2020-09-24 PROCEDURE — 99214 OFFICE O/P EST MOD 30 MIN: CPT | Performed by: ANESTHESIOLOGY

## 2020-09-28 ENCOUNTER — TRANSCRIBE ORDERS (OUTPATIENT)
Dept: ADMINISTRATIVE | Facility: HOSPITAL | Age: 80
End: 2020-09-28

## 2020-09-28 DIAGNOSIS — Z12.31 VISIT FOR SCREENING MAMMOGRAM: Primary | ICD-10-CM

## 2020-09-29 ENCOUNTER — RESULTS ENCOUNTER (OUTPATIENT)
Dept: PAIN MEDICINE | Facility: CLINIC | Age: 80
End: 2020-09-29

## 2020-09-29 DIAGNOSIS — Z01.818 PREOPERATIVE TESTING: ICD-10-CM

## 2020-10-16 ENCOUNTER — LAB REQUISITION (OUTPATIENT)
Dept: LAB | Facility: HOSPITAL | Age: 80
End: 2020-10-16

## 2020-10-16 ENCOUNTER — LAB (OUTPATIENT)
Dept: LAB | Facility: HOSPITAL | Age: 80
End: 2020-10-16

## 2020-10-16 ENCOUNTER — TELEPHONE (OUTPATIENT)
Dept: ONCOLOGY | Facility: CLINIC | Age: 80
End: 2020-10-16

## 2020-10-16 DIAGNOSIS — D50.8 OTHER IRON DEFICIENCY ANEMIA: ICD-10-CM

## 2020-10-16 DIAGNOSIS — D50.8 OTHER IRON DEFICIENCY ANEMIA: Primary | ICD-10-CM

## 2020-10-16 DIAGNOSIS — Z00.00 ROUTINE GENERAL MEDICAL EXAMINATION AT A HEALTH CARE FACILITY: ICD-10-CM

## 2020-10-16 NOTE — TELEPHONE ENCOUNTER
Returned call to patient after discussing with Dr. Barreto. Informing patient that Dr Barreto wanted patient to have labs redrawn a near [facility]. Patient stating she understood.

## 2020-10-16 NOTE — TELEPHONE ENCOUNTER
Caller: EVARISTO CHAUDHARI    Relationship to patient: SELF    Best call back number: [phone]    PT HAS A NEW PT APPT WITH DR NGUYEN ON 10/21. PT WANTS TO DOUBLE CHECK WHETHER SHE WILL BE SCHED FOR ANY LABS OR AN INFUSION THAT DAY AS WELL.

## 2020-10-21 ENCOUNTER — LAB REQUISITION (OUTPATIENT)
Dept: LAB | Facility: HOSPITAL | Age: 80
End: 2020-10-21

## 2020-10-21 ENCOUNTER — CONSULT (OUTPATIENT)
Dept: ONCOLOGY | Facility: CLINIC | Age: 80
End: 2020-10-21

## 2020-10-21 VITALS
HEART RATE: 80 BPM | HEIGHT: 59 IN | BODY MASS INDEX: 23.18 KG/M2 | OXYGEN SATURATION: 97 % | RESPIRATION RATE: 18 BRPM | TEMPERATURE: 97.7 F | DIASTOLIC BLOOD PRESSURE: 63 MMHG | WEIGHT: 115 LBS | SYSTOLIC BLOOD PRESSURE: 137 MMHG

## 2020-10-21 DIAGNOSIS — Z00.00 ROUTINE GENERAL MEDICAL EXAMINATION AT A HEALTH CARE FACILITY: ICD-10-CM

## 2020-10-21 DIAGNOSIS — D63.8 ANEMIA WITH CHRONIC ILLNESS: ICD-10-CM

## 2020-10-21 DIAGNOSIS — D50.8 OTHER IRON DEFICIENCY ANEMIA: Primary | ICD-10-CM

## 2020-10-21 PROCEDURE — 99203 OFFICE O/P NEW LOW 30 MIN: CPT | Performed by: INTERNAL MEDICINE

## 2020-10-21 RX ORDER — LEVOTHYROXINE SODIUM 0.07 MG/1
75 TABLET ORAL DAILY
COMMUNITY
Start: 2020-10-15 | End: 2023-01-30 | Stop reason: SDUPTHER

## 2020-10-21 RX ORDER — GABAPENTIN 100 MG/1
100 CAPSULE ORAL NIGHTLY PRN
COMMUNITY
Start: 2020-10-13 | End: 2020-12-23 | Stop reason: SDUPTHER

## 2020-10-21 NOTE — PROGRESS NOTES
ID: 80 y.o. year old female from [facility] [id number]    PCP: DEANN Alejandra MD    REFERRING PHYSICIAN: Mina Barnett MD    Reason for Consultation: Normocytic anemia    Dear Dr. Barnett    It is a pleasure to meet Ms. Gonzales today.  She is a very pleasant 80-year-old lady who presents today for consultation due to persistent normocytic anemia.  She reports her hemoglobin runs in the low 10s.  Her most recent labs showed hemoglobin 11.2 with a normocytic MCV.  She had iron profile performed which showed low iron and transferrin saturations.  But her TIBC was in the normal range.  This would suggest an anemia of chronic inflammation.  She also has underlying kidney disease with a creatinine of over 2.  She does not see any overt bleeding.  She does have esophageal strictures with possibly a component of achalasia present.  She is scheduled to see Dr. Williamson for pain management soon.  They were considering a pump.      Past Medical History:   Diagnosis Date   • Arthritis    • Cancer (CMS/HCC)     skin   • GERD (gastroesophageal reflux disease)    • Glaucoma    • Kidney disease    • Lumbar stenosis    • Menopause    • Mumps    • PVD (peripheral vascular disease) (CMS/HCC)    • Stroke (CMS/HCC)        Past Surgical History:   Procedure Laterality Date   • BREAST BIOPSY Bilateral     BOTH BREASTS US BX, PT DOES NOT REMEMBER WHEN   • COLONOSCOPY  10/2016   • HYSTERECTOMY  1981    Complete   • OOPHORECTOMY      Comnplete Hysterectomy   • OTHER SURGICAL HISTORY  1999    Scalp   • SKIN CANCER EXCISION Left 2005    Hand   • TONSILLECTOMY  1944   • UPPER GASTROINTESTINAL ENDOSCOPY  10/2016    Esophageal Stretching       Social History     Socioeconomic History   • Marital status:      Spouse name: Not on file   • Number of children: Not on file   • Years of education: Not on file   • Highest education level: Not on file   Tobacco Use   • Smoking status: Never Smoker   • Smokeless tobacco: Never Used   Substance and  Sexual Activity   • Alcohol use: No     Frequency: Never   • Drug use: No   • Sexual activity: Defer       Family History   Problem Relation Age of Onset   • Heart disease Mother    • Heart attack Mother    • Lung disease Father    • Heart disease Sister    • Cancer Sister    • Heart disease Brother    • Cancer Sister    • Cancer Brother    • Heart disease Brother    • Heart disease Brother    • Breast cancer Neg Hx    • Ovarian cancer Neg Hx        Review of Systems:    16 point review of systems was performed and reviewed and scanned into the EMR    Review of Systems - Oncology      Current Outpatient Medications:   •  acetaminophen (TYLENOL) 500 MG tablet, Take 500 mg by mouth Every 6 (Six) Hours As Needed., Disp: , Rfl:   •  aspirin  MG tablet, Take 325 mg by mouth Daily., Disp: , Rfl:   •  calcitriol (ROCALTROL) 0.25 MCG capsule, Take 0.25 mcg by mouth 2 (Two) Times a Week., Disp: , Rfl:   •  calcium carbonate (OS-YASMANY) 600 MG tablet, Take 600 mg by mouth Daily., Disp: , Rfl:   •  Cholecalciferol (VITAMIN D3) 125 MCG (5000 UT) capsule capsule, Take 5,000 Units by mouth Daily., Disp: , Rfl:   •  gabapentin (NEURONTIN) 100 MG capsule, , Disp: , Rfl:   •  HYDROcodone-acetaminophen (NORCO) 5-325 MG per tablet, Take 1 tablet by mouth Daily., Disp: , Rfl:   •  levothyroxine (SYNTHROID, LEVOTHROID) 50 MCG tablet, Take 50 mcg by mouth Daily., Disp: , Rfl:   •  levothyroxine (SYNTHROID, LEVOTHROID) 75 MCG tablet, , Disp: , Rfl:   •  magnesium gluconate (MAGONATE) 500 MG tablet, Take 27 mg by mouth Daily., Disp: , Rfl:   •  Multiple Vitamin (MULTI-VITAMIN DAILY PO), Take  by mouth Daily., Disp: , Rfl:   •  omeprazole (priLOSEC) 40 MG capsule, Take 40 mg by mouth Daily., Disp: , Rfl:   •  ondansetron (ZOFRAN) 4 MG tablet, Take 4 mg by mouth Every 8 (Eight) Hours As Needed for Nausea or Vomiting., Disp: , Rfl:   •  potassium phosphate, monobasic, (K-PHOS) 500 MG tablet, Take 500 mg by mouth Daily., Disp: , Rfl:   •   rosuvastatin (CRESTOR) 20 MG tablet, Take 20 mg by mouth Daily., Disp: , Rfl:   •  triamterene-hydrochlorothiazide (DYAZIDE) 37.5-25 MG per capsule, Take 1 capsule by mouth Every Morning., Disp: , Rfl:   •  vitamin B-12 (CYANOCOBALAMIN) 1000 MCG tablet, Take 1,000 mcg by mouth Daily., Disp: , Rfl:     Pain Medications             acetaminophen (TYLENOL) 500 MG tablet Take 500 mg by mouth Every 6 (Six) Hours As Needed.    aspirin  MG tablet Take 325 mg by mouth Daily.    gabapentin (NEURONTIN) 100 MG capsule     HYDROcodone-acetaminophen (NORCO) 5-325 MG per tablet Take 1 tablet by mouth Daily.           Allergies   Allergen Reactions   • Erythromycin Rash       ECOG SCORE: 1    Objective     Vitals:    10/21/20 1508   BP: 137/63   Pulse: 80   Resp: 18   Temp: 97.7 °F (36.5 °C)   SpO2: 97%     Body mass index is 23.63 kg/m².  Body surface area is 1.45 meters squared.        10/21/20  1508   Weight: 52.2 kg (115 lb)     Pain Score    10/21/20 1508   PainSc: 6  Comment: Back & Back of Legs          Physical Exam    General: well appearing, in no acute distress  HEENT: sclera anicteric, oropharynx clear, neck is supple  Lymphatics: no cervical, supraclavicular, or axillary adenopathy  Cardiovascular: regular rate and rhythm, no murmurs, rubs or gallops  Lungs: clear to auscultation bilaterally  Abdomen: soft, nontender, nondistended.  No palpable organomegaly  Extremities: no lower extremity edema  Skin: no rashes, lesions, bruising, or petechiae  Msk:  Shows no weakness of the large muscle groups  Psych: Mood is stable          Xr Spine Lumbar Complete With Flex & Ext    Result Date: 7/21/2020  Stable alignment in flexion and extension views with scoliotic curvature with convexity to the right. Disc space narrowing at the L2/L3 and L3/L4 levels.  D:  07/20/2020 E:  07/21/2020  This report was finalized on 7/21/2020 5:20 PM by Dr. Ignacia Rm MD.      Mri Lumbar Spine Without Contrast    Result Date:  7/21/2020  1. Moderate dextroconvex scoliosis. 2. Advanced multilevel degenerative disc disease, with high-grade canal stenosis at L3-4. 3. Right paracentral disc herniation at L4-5, with right lateral recess stenosis, and moderate to marked canal stenosis. 4. Moderate L5-S1 canal stenosis due to broad-based disc protrusion and posterior element hypertrophy. Moderate right-sided foraminal stenosis. Please see above complete description by disc level.  D:  07/20/2020 E:  07/20/2020  This report was finalized on 7/21/2020 10:43 PM by Dr. Vinod Agustin MD.                  Erythropoietin of 11.2    Hemoglobin of 11.1 with an MCV of 94.4      Assessment/Plan      1.  Normocytic anemia.  This appears to be anemia of chronic inflammation in the setting of mild kidney dysfunction.  Does not appear to be an iron deficiency since her TIBC is in the normal range.  She is not transfusion dependent and her hemoglobin is reasonable.  At this time I would not recommend any intervention.  There is a possibility that she could have anemia of age.  However that would require a bone marrow biopsy to rule out other things.  Since her anemia is fairly mild I like to retest her in couple of months to see if there is any changes.  I will check a ferritin at that time also.          Thank you for allowing me to participate in the care of this patient.    Yours sincerely,    Shira Kelly MD  [facility]  Hematology and Oncology    Return on: 12/18/20    Orders Placed This Encounter   Procedures   • Ferritin     Standing Status:   Future     Standing Expiration Date:   10/21/2021   • Iron Profile     Standing Status:   Future     Standing Expiration Date:   10/21/2021   • Erythropoietin     Standing Status:   Future     Standing Expiration Date:   10/21/2021   • CBC & Differential     Standing Status:   Future     Standing Expiration Date:   10/21/2021     Order Specific Question:   Manual Differential     Answer:   No

## 2020-10-22 ENCOUNTER — TELEPHONE (OUTPATIENT)
Dept: ONCOLOGY | Facility: CLINIC | Age: 80
End: 2020-10-22

## 2020-10-22 DIAGNOSIS — Z01.818 PREOPERATIVE TESTING: Primary | ICD-10-CM

## 2020-10-22 DIAGNOSIS — M48.062 LUMBAR STENOSIS WITH NEUROGENIC CLAUDICATION: ICD-10-CM

## 2020-10-22 LAB
APTT PPP: NORMAL SEC
ERYTHROCYTE [DISTWIDTH] IN BLOOD BY AUTOMATED COUNT: 14.2 % (ref 11.7–15.4)
HCT VFR BLD AUTO: 35.2 % (ref 34–46.6)
HGB BLD-MCNC: 11.5 G/DL (ref 11.1–15.9)
INR PPP: NORMAL
MCH RBC QN AUTO: 30.7 PG (ref 26.6–33)
MCHC RBC AUTO-ENTMCNC: 32.7 G/DL (ref 31.5–35.7)
MCV RBC AUTO: 94 FL (ref 79–97)
PLATELET # BLD AUTO: 363 X10E3/UL (ref 150–450)
PROTHROMBIN TIME: NORMAL S
RBC # BLD AUTO: 3.75 X10E6/UL (ref 3.77–5.28)
SPECIMEN STATUS: NORMAL
WBC # BLD AUTO: 6.7 X10E3/UL (ref 3.4–10.8)

## 2020-10-22 NOTE — TELEPHONE ENCOUNTER
Rec'd blood work results collected 10/16/2020 from [facility] and everything was the same from blood work drawn on 10/13/2020 per Dr. Barreto.  Pt notified and verbalized understanding.

## 2020-10-23 ENCOUNTER — APPOINTMENT (OUTPATIENT)
Dept: PREADMISSION TESTING | Facility: HOSPITAL | Age: 80
End: 2020-10-23

## 2020-10-23 LAB — SARS-COV-2 RNA RESP QL NAA+PROBE: NOT DETECTED

## 2020-10-23 PROCEDURE — C9803 HOPD COVID-19 SPEC COLLECT: HCPCS

## 2020-10-23 PROCEDURE — U0004 COV-19 TEST NON-CDC HGH THRU: HCPCS

## 2020-10-26 ENCOUNTER — OUTSIDE FACILITY SERVICE (OUTPATIENT)
Dept: PAIN MEDICINE | Facility: CLINIC | Age: 80
End: 2020-10-26

## 2020-10-26 PROCEDURE — 63650 IMPLANT NEUROELECTRODES: CPT | Performed by: ANESTHESIOLOGY

## 2020-10-26 PROCEDURE — 99152 MOD SED SAME PHYS/QHP 5/>YRS: CPT | Performed by: ANESTHESIOLOGY

## 2020-10-27 ENCOUNTER — TELEPHONE (OUTPATIENT)
Dept: PAIN MEDICINE | Facility: CLINIC | Age: 80
End: 2020-10-27

## 2020-10-27 NOTE — TELEPHONE ENCOUNTER
scs trial (ST REJI) 10/26/2020.    Spoke with patient. She reports she is doing well. She does not have any pain when she is up and walking.   Advised patient that I'd call her with time of appointment for lead pull. She verbalized understanding.

## 2020-10-29 ENCOUNTER — OFFICE VISIT (OUTPATIENT)
Dept: PAIN MEDICINE | Facility: CLINIC | Age: 80
End: 2020-10-29

## 2020-10-29 VITALS
WEIGHT: 111.6 LBS | SYSTOLIC BLOOD PRESSURE: 100 MMHG | HEIGHT: 59 IN | HEART RATE: 85 BPM | DIASTOLIC BLOOD PRESSURE: 60 MMHG | TEMPERATURE: 98.7 F | OXYGEN SATURATION: 98 % | RESPIRATION RATE: 16 BRPM | BODY MASS INDEX: 22.5 KG/M2

## 2020-10-29 DIAGNOSIS — M48.062 LUMBAR STENOSIS WITH NEUROGENIC CLAUDICATION: ICD-10-CM

## 2020-10-29 DIAGNOSIS — M51.37 DEGENERATION OF LUMBAR OR LUMBOSACRAL INTERVERTEBRAL DISC: ICD-10-CM

## 2020-10-29 DIAGNOSIS — Z46.2 ENCOUNTER FOR FITTING AND ADJUSTMENT OF NEUROPACEMAKER OF SPINAL CORD: ICD-10-CM

## 2020-10-29 DIAGNOSIS — I73.9 PVD (PERIPHERAL VASCULAR DISEASE) (HCC): ICD-10-CM

## 2020-10-29 DIAGNOSIS — M47.816 SPONDYLOSIS OF LUMBAR REGION WITHOUT MYELOPATHY OR RADICULOPATHY: ICD-10-CM

## 2020-10-29 DIAGNOSIS — M53.3 SACROILIAC JOINT DYSFUNCTION OF LEFT SIDE: ICD-10-CM

## 2020-10-29 DIAGNOSIS — R26.9 GAIT DISTURBANCE: ICD-10-CM

## 2020-10-29 DIAGNOSIS — Z91.81 AT HIGH RISK FOR FALLS: ICD-10-CM

## 2020-10-29 PROCEDURE — 99024 POSTOP FOLLOW-UP VISIT: CPT | Performed by: NURSE PRACTITIONER

## 2020-10-29 PROCEDURE — 95970 ALYS NPGT W/O PRGRMG: CPT | Performed by: NURSE PRACTITIONER

## 2020-10-29 NOTE — PROGRESS NOTES
"Chief Complaint: \"I did very well during the stimulator trial. I have had practically no pain.\"    Brief History: Mrs. Danay Gonzales is a 80 y.o. female, who returns to the clinic for possible spinal cord stimulator reprogramming and removal of spinal cord stimulator trial leads placed on 10/26/2020. Ms. Danay Gonzales reports % relief of her chronic lower back and lower extremity pain along with remarkable functional improvement with the use of her stimulator device. Patient reports significant relief of her previously severe neurogenic claudication. In addition, patient reports improvement of her nocturnal pain with the use of the SCS device. She reports she has been essentially \"pain free\" since her stimulator trial. Patient did not require additional reprogramming throughout the trial.  Pain level is rated as 1/10 with the stimulator \"turned on.”   Patient level ranges from 0/10 to 1/10 with the use of the spinal cord stimulator.    Patient did not take additional analgesics throughout her spinal cord stimulator trial. She did not take any hydrocodone throughout the trial. She has remained afebrile throughout the trial. Dressings are dry and intact. Patient denies any complications related to the procedure.   Patient denies  pain, numbness and weakness in lower extremities.  Patient denies  any new bladder or bowel problems.   Patient is very satisfied with the trial and would like to move forward with implantation of a spinal cord stimulator device.     Pain History:  Patient reports a 30-year history of lower back pain, which began without incident.  Patient reports that about 18 years ago she saw Dr. Chester Tijerina and underwent some injections with relief.  More recently, she has been having injections every 3 months at [facility].  Her last injection was in May 2020.  Patient reports that after each of those procedures she experienced about a month of discrete pain relief.  When I saw her " for her initial visit in May, she did not have any diagnostic studies for review.  Since then, she underwent a comprehensive diagnostic work-up including MRI of the lumbar spine, flexion-extension films of the lumbar spine, arterial duplex Doppler of the lower extremities, and electrodiagnostic studies.  In addition, she underwent recent psychological consultation with Dr. Eliazar Perkins who has found her to be an Appropriate Candidate for Spinal Cord Stimulation.  Patient comes to the clinic today to discuss prospects of her spinal cord stimulator trial.  She denies any significant change in her medical history other than a recent outbreak of zoster's, which has resolved at this time.  Prior to her shingles, she was participating in a physical therapy program with minimal functional improvement.  Patient was treated with valacyclovir and steroids.  Pain Description: Constant pain with intermittent exacerbation, described as excruciating sharp, dull, aching, sensation.   Radiation of Pain: The pain radiates from the gluteal region onto the posterior aspect of her thighs and calves stopping at the ankle  Pain intensity today: 5/10  Average pain intensity last week: 7/10  Pain intensity ranges from: 5/10 to 9/10  Aggravating factors: Pain increases with standing longer than 5 minutes and ambulating more than 15 yards. Patient describes severe neurogenic claudication  Alleviating factors: Pain decreases with sitting and lying down.   Associated Symptoms:   Patient reports pain, numbness and weakness in the lower extremities.   Patient denies any new bladder or bowel problems.   Patient reports difficulties with her balance but denies falls.      Review of previous therapies and additional medical records:  Danay Gonzales has already failed the following measures, including:   Conservative Measures: Oral analgesics and physical therapy   Interventional Measures: >18 years ago with Dr Tijerina, and at [facility] every 3  "months  Surgical Measures: No history of previous lumbar spine surgery   Danay Gonzales underwent orthopedic spine consultation with Dr. Piedra and was found not to be a surgical candidate (as per patient).  Patient underwent psychological consultation with Dr. Eliazar Perkins on September 2, 2020: \"From a psychological perspective, patient is considered to be an appropriate candidate for a spinal cord stimulator at this time.\"  Danay Gonzales presents with significant comorbidities including PVD, HTN, hyperlipedemia  In terms of current analgesics, Danay Gonzales takes: Lortab QAM, Tylenol, aspirin  I have reviewed [facility] Report #[id number] consistent with medication reconciliation.     Global Pain Scale 05-28 2020 09-24  020  10-29  2020             Pain 20 15  1             Feelings 5 5  2             Clinical outcomes 21 15  5             Activities 21 15  2             GPS Total: 67 50  10                 Review of Diagnostic Studies:   MRI of the lumbar spine without contrast 07/20/2020.  No compression deformity or focal subluxation is seen. No vertebral  marrow edema is identified. There are disc protrusions at each level in the lumbar spine, as well as associated facet DJD and potentially significant spinal canal narrowing at L2-L3, L3-4, L4-5 and L5-S1. Tip of conus medullaris lies at L1. Axial images: T12-L1, L1-L2: No evidence of significant canal or foraminal stenosis  L2-L3: Canal appears borderline stenotic. Right neural foramen appears normal. Left neural foramen appears moderately narrowed.  L3-L4: Broad-based disc protrusion and extensive posterior element hypertrophy with marked canal stenosis and complete effacement of CSF. Left neural foramen appears severely stenotic. There is moderately narrowed right neural foramen.  L4-L5: Right paracentral disc herniation with right lateral recess stenosis and marked canal stenosis. Foramina appear normal on the left and moderately narrowed on the " "right.  L5-S1: Moderate canal stenosis due to broad-based disc protrusion and posterior element hypertrophy. There is moderate  right-sided foraminal narrowing and milder left-sided foraminal narrowing.  Flexion and extension x-rays of the lumbar spine 07/20/2020: Stable alignment in flexion and extension views with  scoliotic curvature with convexity to the right. Disc space narrowing at the L2/L3 and L3/L4 levels.  Arterial duplex Doppler of the lower extremities with ABIs 07/13/2020  Right Conclusion: The right SHAJI is normal.  Left Conclusion: The left SHAJI is normal  EMG/NCV of the bilateral lower extremities 07/20/2020: Mild peripheral neuropathy. Mild left peroneal neuropathy at knee. Chronic bilateral S1 radiculopathy   XR HIP W OR WO PELVIS 2-3 VIEW RIGHT- 07/16/2018: Degenerative changes. No acute osseous abnormality.      The following portions of the patient's history were reviewed and updated as appropriate: problem list, past medical history, past surgery history, social history, family history, medications, and allergies    Review of Systems    /60   Pulse 85   Temp 98.7 °F (37.1 °C)   Resp 16   Ht 148.6 cm (58.5\")   Wt 50.6 kg (111 lb 9.6 oz)   SpO2 98%   BMI 22.92 kg/m²       Physical Exam   Neurologic Exam   Constitutional: Patient appears well-developed and well-nourished.   HEENT: Head: Normocephalic and atraumatic.   Eyes: Conjunctivae and lids are normal.   Pupils: Equal, round, reactive to light.   Neck: Trachea normal. Neck supple. No JVD present.   Peripheral vascular exam: Femoral: right 1+, left 1+. Posterior tibialis: right 1+ and left 1+. Dorsalis pedis: right 1+ and left 1+. No edema.   Musculoskeletal   Gait and station: Gait evaluation demonstrated shuffling   Lumbar spine: Passive and active range of motion are improved and without pain. Extension of the lumbar spine did not increase and reproduce pain. Lumbar facet joint loading maneuvers are positive.  Armando test, " Gaenslen's test, thigh thrust test, SI compression test are positive on the left, negative on the right  Piriformis maneuvers are negative   Palpation of the bilateral ischial tuberosities, unrevealing   Palpation of the bilateral greater trochanter, unrevealing   Examination of the Iliotibial band: unrevealing   Hip joints: The range of motion of the hip joints is almost full and without pain   Neurological:   Patient is alert and oriented to person, place, and time.   Speech: speech is normal.   Cortical function: Normal mental status.   Cranial nerves: Cranial nerves 2-12 intact.   Reflex Scores:  Right patellar: 1+  Left patellar: 1+  Right Achilles: 1+  Left Achilles: 1+  Motor strength: 5/5  Motor Tone: normal tone.   Involuntary movements: none.   Superficial/Primitive Reflexes: primitive reflexes were absent.   Right Baldwin: absent  Left Baldwin: absent  Right ankle clonus: absent  Left ankle clonus: absent   Babinsky: absent  Negative long tract signs. Straight leg raising test is negative. Femoral stretch sign is negative  Sensation: No sensory loss. Sensory exam: intact to light touch, intact pain and temperature sensation, intact vibration sensation and  Normal proprioception  Coordination: Normal finger to nose and heel to shin. Normal balance and negative Romberg's sign   Skin and subcutaneous tissue: Skin is warm and intact. No rash noted. No cyanosis.   Psychiatric: Judgment and insight: Normal. Orientation to person, place and time: Normal. Recent and remote memory: Intact. Mood and affect: Normal.        PROCEDURES:   Procedure #1: Analysis of the spinal cord stimulator device without spinal cord stimulator reprogramming   Patient used the [employer] Jude spinal cord stimulator device 100% of the time since initiation of the trial    Program TWO (Best Program):    Electrode polarities: 9+, 12-1   Amplitude: 0.45-1.5 mA     Pulse width: 1000 mcs  Rate: 40 Hz  IntraBurst rate: 500 Hz  Micro-dosing ratio:  30:90      Procedure #2: Removal of spinal cord stimulator trial leads.   Two leads were removed with tips intact. There is no erythema, drainage, or fluid accumulation at the site. The area was cleansed with chlorhexidine.  A small Covaderm was applied.     ASSESSMENT:   1. Lumbar stenosis with neurogenic claudication    2. Spondylosis of lumbar region without myelopathy or radiculopathy    3. Degeneration of lumbar or lumbosacral intervertebral disc    4. Sacroiliac joint dysfunction of left side    5. PVD (peripheral vascular disease) (CMS/HCC)    6. At high risk for falls    7. Gait disturbance    8. Encounter for fitting and adjustment of neuropacemaker of spinal cord        PLAN: Patient's chronic pain condition is significantly improved during her SCS trial. Patient presents with lumbar spinal stenosis with neurogenic claudication.  She also has a history of peripheral vascular disease with likely some vascular claudication superimposed to her lumbar issues.  Over the past few years, patient started noticing more difficulties with pain and ambulation. Patient underwent psychological consultation with Dr. Eliazar Perkins and has been found to be an appropriate candidate for spinal cord stimulation from a psychological standpoint. At this point patient is very satisfied with the trial and would like to move forward with implantation of a spinal cord stimulator device. I had a lengthy conversation with Mrs. Gonzales regarding her condition and potential therapeutic options including risks, benefits, surgical options, alternative therapies, to name a few.  Therefore, the proposed the following plan:  1. Referral to Dr. Jae Graves in consultation for implantation of a spinal cord stimulator device. I have recommended [employer] Penta paddle lead (Full Body MRI compatible)   with the top electrodes projecting at the level of the superior endplate endplate of the T7 vertebral level. I have recommended [employer] Sk[employer]  Proclaim XR IPG Non-Rechargeable (Full Body MRI compatible) . Patient will follow-up with us thereafter.   2. Diagnostics: MRI of the thoracic spine without contrast to assess patency of thoracic epidural space for placement of surgical leads.   3. Pharmacological measures: Reviewed. Discussed. Patient takes Lortab on an as-needed basis. Patient has declined additional pharmacological measures   4. Long-term rehabilitation efforts:  A. The patient does not have a history of falls. I did complete a risk assessment for falls. Fall precautions: Patient has been instructed regarding universal fall precautions, such as;   · Using gait aids a cane or a rolling walker at the appropriate height at all times for ambulation   · Removing all area rugs and coffee tables to create a safe environment at home  · Ensure clean, dry floors  · Wearing supportive footwear and properly fitting clothing  · Ensure bed/chair is appropriate height and patient's feet can touch the floor  · Using a shower transfer bench  · Using walk-in shower and having shower safety bars installed  · Ensure proper lighting, minimize glare  · Have nightlights operational and in use  · Participation in an exercise program for gait training, balance training and strength  · Avoid carrying laundry up and down steps  · Ensure proper compliance and organization of medications to avoid errors   · Avoid use of over the counter sedatives and alcohol consumption  · Ensure easy access to call bell, glasses, TV control, telephone  · Ensure glasses/hearing aids are in use or close by (on top of night table)  B.  Patient will start a comprehensive physical therapy program at [facility] with Dr. Hayder Weston for therapeutic exercise, core strengthening, gait and balance training, neurodynamics, myofascial release, cupping, dry needling and Alter-G 4-5 weeks after implant  C. Contrast therapy: Apply ice-packs for 15-20 minutes, followed by heating pads for 15-20 minutes to  affected area   4. The patient has been instructed to contact my office with any questions or difficulties. The patient understands the plan and agrees to proceed accordingly.      Patient Care Team:  DEANN Alejandra MD as PCP - General  DEANN Alejandra MD as PCP - Family Medicine     No orders of the defined types were placed in this encounter.        Future Appointments   Date Time Provider Department Center   12/12/2020  3:30 PM [facility]   12/17/2020  1:00 PM Shira Kelly MD [facility]   1/11/2021  3:30 PM Neal Abernathy III, MD [facility] None         [facility]      EMR Dragon/Transcription disclaimer:  Much of this encounter note is an electronic transcription of spoken language to printed text. Electronic transcription of spoken language may permit erroneous, or at times, nonsensical words or phrases to be inadvertently transcribed. Although I have reviewed the note for such errors, some may still exist.

## 2020-11-05 ENCOUNTER — APPOINTMENT (OUTPATIENT)
Dept: MRI IMAGING | Facility: HOSPITAL | Age: 80
End: 2020-11-05

## 2020-11-06 ENCOUNTER — APPOINTMENT (OUTPATIENT)
Dept: CT IMAGING | Facility: HOSPITAL | Age: 80
End: 2020-11-06

## 2020-11-06 ENCOUNTER — HOSPITAL ENCOUNTER (OUTPATIENT)
Dept: CT IMAGING | Facility: HOSPITAL | Age: 80
End: 2020-11-06

## 2020-11-11 ENCOUNTER — OFFICE VISIT (OUTPATIENT)
Dept: NEUROSURGERY | Facility: CLINIC | Age: 80
End: 2020-11-11

## 2020-11-11 ENCOUNTER — PREP FOR SURGERY (OUTPATIENT)
Dept: OTHER | Facility: HOSPITAL | Age: 80
End: 2020-11-11

## 2020-11-11 VITALS — HEIGHT: 59 IN | TEMPERATURE: 97.7 F | BODY MASS INDEX: 22.9 KG/M2 | WEIGHT: 113.6 LBS

## 2020-11-11 DIAGNOSIS — M48.062 LUMBAR STENOSIS WITH NEUROGENIC CLAUDICATION: Primary | ICD-10-CM

## 2020-11-11 PROCEDURE — 99204 OFFICE O/P NEW MOD 45 MIN: CPT | Performed by: NEUROLOGICAL SURGERY

## 2020-11-11 RX ORDER — CEFAZOLIN SODIUM 2 G/100ML
2 INJECTION, SOLUTION INTRAVENOUS ONCE
Status: CANCELLED | OUTPATIENT
Start: 2020-11-11 | End: 2020-11-11

## 2020-11-11 NOTE — H&P
Patient: Danay Gonzales  : 1940     Primary Care Provider: DEANN Alejandra MD     Requesting Provider: As above           History     Chief Complaint: Low back and bilateral lower extremity pain with walking and standing intolerance.     History of Present Illness: Ms. Gonzales is an 80-year-old woman who formerly worked in [employer] who has a many year history of progressive back pain that extends into the back of her thighs with any standing or walking.  She does better with sitting or lying down.  She is done physical therapy as well as water therapy.  She has had injections.  Occasionally she has numbness in her legs.  She reports no bowel or bladder dysfunction.  She apparently was seen at [facility] and by report was told that there were no surgical options for her.  Her gait is stooped.  She recently underwent a trial of a spinal cord stimulator which helped with her pain however she still continued to have some walking and standing intolerance.     Review of Systems   Constitutional: Positive for fatigue. Negative for activity change, appetite change, chills, diaphoresis, fever and unexpected weight change.   HENT: Positive for hearing loss. Negative for congestion, dental problem, drooling, ear discharge, ear pain, facial swelling, mouth sores, nosebleeds, postnasal drip, rhinorrhea, sinus pressure, sinus pain, sneezing, sore throat, tinnitus, trouble swallowing and voice change.    Eyes: Negative for photophobia, pain, discharge, redness, itching and visual disturbance.   Respiratory: Negative for apnea, cough, choking, chest tightness, shortness of breath, wheezing and stridor.    Cardiovascular: Negative for chest pain, palpitations and leg swelling.   Gastrointestinal: Positive for constipation. Negative for abdominal distention, abdominal pain, anal bleeding, blood in stool, diarrhea, nausea, rectal pain and vomiting.   Endocrine: Negative for cold intolerance, heat intolerance,  polydipsia, polyphagia and polyuria.   Genitourinary: Negative for decreased urine volume, difficulty urinating, dyspareunia, dysuria, enuresis, flank pain, frequency, genital sores, hematuria, menstrual problem, pelvic pain, urgency, vaginal bleeding, vaginal discharge and vaginal pain.   Musculoskeletal: Positive for arthralgias, back pain, gait problem, neck pain and neck stiffness. Negative for joint swelling and myalgias.   Skin: Negative for color change, pallor, rash and wound.   Allergic/Immunologic: Negative for environmental allergies, food allergies and immunocompromised state.   Neurological: Negative for dizziness, tremors, seizures, syncope, facial asymmetry, speech difficulty, weakness, light-headedness, numbness and headaches.   Hematological: Negative for adenopathy. Does not bruise/bleed easily.   Psychiatric/Behavioral: Negative for agitation, behavioral problems, confusion, decreased concentration, dysphoric mood, hallucinations, self-injury, sleep disturbance and suicidal ideas. The patient is not nervous/anxious and is not hyperactive.          The patient's past medical history, past surgical history, family history, and social history have been reviewed at length in the electronic medical record.     Past Medical History:   Diagnosis Date   • Arthritis    • Cancer (CMS/HCC)     skin   • GERD (gastroesophageal reflux disease)    • Glaucoma    • Headache    • Kidney disease    • Low back pain    • Lumbar stenosis    • Menopause    • Mumps    • PVD (peripheral vascular disease) (CMS/HCC)    • Stroke (CMS/HCC)      Past Surgical History:   Procedure Laterality Date   • BREAST BIOPSY Bilateral     BOTH BREASTS US BX, PT DOES NOT REMEMBER WHEN   • COLONOSCOPY  10/2016   • HYSTERECTOMY  1981    Complete   • OOPHORECTOMY      Comnplete Hysterectomy   • OTHER SURGICAL HISTORY  1999    Scalp   • SKIN CANCER EXCISION Left 2005    Hand   • TONSILLECTOMY  1944   • UPPER GASTROINTESTINAL ENDOSCOPY  10/2016     Esophageal Stretching     Family History   Problem Relation Age of Onset   • Heart disease Mother    • Heart attack Mother    • Stroke Mother    • Lung disease Father    • Heart disease Sister    • Cancer Sister    • Heart disease Brother    • Stroke Brother    • Cancer Sister    • Cancer Brother    • Stroke Brother    • Heart disease Brother    • Heart disease Brother    • Breast cancer Neg Hx    • Ovarian cancer Neg Hx      Social History     Socioeconomic History   • Marital status:      Spouse name: Not on file   • Number of children: Not on file   • Years of education: Not on file   • Highest education level: Not on file   Tobacco Use   • Smoking status: Never Smoker   • Smokeless tobacco: Never Used   Substance and Sexual Activity   • Alcohol use: No     Frequency: Never   • Drug use: No   • Sexual activity: Defer       Allergies   Allergen Reactions   • Erythromycin Rash       Current Outpatient Medications on File Prior to Visit   Medication Sig Dispense Refill   • acetaminophen (TYLENOL) 500 MG tablet Take 500 mg by mouth Every 6 (Six) Hours As Needed.     • aspirin  MG tablet Take 325 mg by mouth Daily.     • calcitriol (ROCALTROL) 0.25 MCG capsule Take 0.25 mcg by mouth 2 (Two) Times a Week.     • calcium carbonate (OS-YASMANY) 600 MG tablet Take 600 mg by mouth Daily.     • Cholecalciferol (VITAMIN D3) 125 MCG (5000 UT) capsule capsule Take 5,000 Units by mouth Daily.     • gabapentin (NEURONTIN) 100 MG capsule      • HYDROcodone-acetaminophen (NORCO) 5-325 MG per tablet Take 1 tablet by mouth Daily.     • levothyroxine (SYNTHROID, LEVOTHROID) 50 MCG tablet Take 50 mcg by mouth Daily.     • levothyroxine (SYNTHROID, LEVOTHROID) 75 MCG tablet      • magnesium gluconate (MAGONATE) 500 MG tablet Take 27 mg by mouth Daily.     • Multiple Vitamin (MULTI-VITAMIN DAILY PO) Take  by mouth Daily.     • omeprazole (priLOSEC) 40 MG capsule Take 40 mg by mouth Daily.     • ondansetron (ZOFRAN) 4 MG  "tablet Take 4 mg by mouth Every 8 (Eight) Hours As Needed for Nausea or Vomiting.     • potassium phosphate, monobasic, (K-PHOS) 500 MG tablet Take 500 mg by mouth Daily.     • rosuvastatin (CRESTOR) 20 MG tablet Take 20 mg by mouth Daily.     • triamterene-hydrochlorothiazide (DYAZIDE) 37.5-25 MG per capsule Take 1 capsule by mouth Every Morning.     • vitamin B-12 (CYANOCOBALAMIN) 1000 MCG tablet Take 1,000 mcg by mouth Daily.       No current facility-administered medications on file prior to visit.         Physical Exam:   Temp 97.7 °F (36.5 °C)   Ht 148.6 cm (58.5\")   Wt 51.5 kg (113 lb 9.6 oz)   BMI 23.34 kg/m²   CONSTITUTIONAL: Patient is well-nourished, pleasant and appears stated age.  CV: Heart regular rate and rhythm without murmur, rub, or gallop.  PULMONARY: Lungs are clear to ascultation.  MUSCULOSKELETAL:  Straight leg raising is negative.  Armando's Sign is negative.  ROM in back is somewhat limited in all directions.  Tenderness in the back to palpation is not observed.  NEUROLOGICAL:  Orientation, memory, attention span, language function, and cognition have been examined and are intact.  Strength is intact in the lower extremities to direct testing.  Muscle tone is normal throughout.  Station and gait are somewhat stooped.  She harbors a moderate thoracic kyphosis.  Sensation is intact to light touch testing throughout.  Deep tendon reflexes are difficult to elicit throughout.  Coordination is intact.  CRANIAL NERVES:  Cranial Nerve II: Visual fields are full to confrontation.  Cranial Nerve III, IV, and VI: PERRLADC. Extraocular movements are intact.  Nystagmus is not present.  Cranial Nerve V: Facial sensation is intact to light touch.  Cranial Nerve VII: Muscles of facial expression demonstate no weakness or asymmetry.  Cranial Nerve VIII: Hearing is intact to finger rub bilaterally.  Cranial Nerve IX and X: Palate elevates symmetrically.  Cranial Nerve XI: Shoulder shrug is intact " bilaterally.  Cranial Nerve XII: Tongue is midline without evidence of atrophy or fasciculation.     Medical Decision Making     Data Review:   Lumbar MRI dated 7/20/2020 demonstrates some diffuse degenerative disc disease and facet arthropathy.  There is severe spinal stenosis at L3-4 and L4-5 with moderate to severe stenosis at L5-S1.  Some of this is due to disc protrusion at L4-5.  Mild canal narrowing is noted at L3-4.  She does have some scoliosis.     Thoracic MRI study dated 11/7/2020 demonstrates a capacious thoracic canal in the setting of her significant kyphosis.     Diagnosis:   1.  Lumbar spinal stenosis with neurogenic claudication.  2.  Thoracolumbar deformity.  3.  Mechanical low back pain.     Treatment Options:   I discussed treatment options with the patient.  One option would be multilevel lumbar laminectomy to address her neurogenic claudication.  This would help substantially with her ability to stand and walk.  It would not be a panacea for all of her axial back pain.  The spinal cord stimulator would be another option to help with her pain.  After a lengthy discussion with the patient and her son we have elected to proceed with decompressive laminectomies at L3-4, L4-5, and L5-S1.  The nature of the procedure as well as the potential risks, complications, limitations, and alternatives to the procedure were discussed at length with the patient and the patient has agreed to proceed with surgery.  She does have a history of carotid occlusive disease and is on aspirin.  She may continue that in the perioperative period.          Diagnosis Plan   1. Lumbar stenosis with neurogenic claudication

## 2020-11-11 NOTE — PROGRESS NOTES
Patient: Danay Gonzales  : 1940    Primary Care Provider: DEANN Alejandra MD    Requesting Provider: As above        History    Chief Complaint: Low back and bilateral lower extremity pain with walking and standing intolerance.    History of Present Illness: Ms. Gonzales is an 80-year-old woman who formerly worked in [employer] who has a many year history of progressive back pain that extends into the back of her thighs with any standing or walking.  She does better with sitting or lying down.  She is done physical therapy as well as water therapy.  She has had injections.  Occasionally she has numbness in her legs.  She reports no bowel or bladder dysfunction.  She apparently was seen at [facility] and by report was told that there were no surgical options for her.  Her gait is stooped.  She recently underwent a trial of a spinal cord stimulator which helped with her pain however she still continued to have some walking and standing intolerance.    Review of Systems   Constitutional: Positive for fatigue. Negative for activity change, appetite change, chills, diaphoresis, fever and unexpected weight change.   HENT: Positive for hearing loss. Negative for congestion, dental problem, drooling, ear discharge, ear pain, facial swelling, mouth sores, nosebleeds, postnasal drip, rhinorrhea, sinus pressure, sinus pain, sneezing, sore throat, tinnitus, trouble swallowing and voice change.    Eyes: Negative for photophobia, pain, discharge, redness, itching and visual disturbance.   Respiratory: Negative for apnea, cough, choking, chest tightness, shortness of breath, wheezing and stridor.    Cardiovascular: Negative for chest pain, palpitations and leg swelling.   Gastrointestinal: Positive for constipation. Negative for abdominal distention, abdominal pain, anal bleeding, blood in stool, diarrhea, nausea, rectal pain and vomiting.   Endocrine: Negative for cold intolerance, heat intolerance, polydipsia,  "polyphagia and polyuria.   Genitourinary: Negative for decreased urine volume, difficulty urinating, dyspareunia, dysuria, enuresis, flank pain, frequency, genital sores, hematuria, menstrual problem, pelvic pain, urgency, vaginal bleeding, vaginal discharge and vaginal pain.   Musculoskeletal: Positive for arthralgias, back pain, gait problem, neck pain and neck stiffness. Negative for joint swelling and myalgias.   Skin: Negative for color change, pallor, rash and wound.   Allergic/Immunologic: Negative for environmental allergies, food allergies and immunocompromised state.   Neurological: Negative for dizziness, tremors, seizures, syncope, facial asymmetry, speech difficulty, weakness, light-headedness, numbness and headaches.   Hematological: Negative for adenopathy. Does not bruise/bleed easily.   Psychiatric/Behavioral: Negative for agitation, behavioral problems, confusion, decreased concentration, dysphoric mood, hallucinations, self-injury, sleep disturbance and suicidal ideas. The patient is not nervous/anxious and is not hyperactive.        The patient's past medical history, past surgical history, family history, and social history have been reviewed at length in the electronic medical record.    Physical Exam:   Temp 97.7 °F (36.5 °C)   Ht 148.6 cm (58.5\")   Wt 51.5 kg (113 lb 9.6 oz)   BMI 23.34 kg/m²   CONSTITUTIONAL: Patient is well-nourished, pleasant and appears stated age.  CV: Heart regular rate and rhythm without murmur, rub, or gallop.  PULMONARY: Lungs are clear to ascultation.  MUSCULOSKELETAL:  Straight leg raising is negative.  Armando's Sign is negative.  ROM in back is somewhat limited in all directions.  Tenderness in the back to palpation is not observed.  NEUROLOGICAL:  Orientation, memory, attention span, language function, and cognition have been examined and are intact.  Strength is intact in the lower extremities to direct testing.  Muscle tone is normal throughout.  Station " and gait are somewhat stooped.  She harbors a moderate thoracic kyphosis.  Sensation is intact to light touch testing throughout.  Deep tendon reflexes are difficult to elicit throughout.  Coordination is intact.  CRANIAL NERVES:  Cranial Nerve II: Visual fields are full to confrontation.  Cranial Nerve III, IV, and VI: PERRLADC. Extraocular movements are intact.  Nystagmus is not present.  Cranial Nerve V: Facial sensation is intact to light touch.  Cranial Nerve VII: Muscles of facial expression demonstate no weakness or asymmetry.  Cranial Nerve VIII: Hearing is intact to finger rub bilaterally.  Cranial Nerve IX and X: Palate elevates symmetrically.  Cranial Nerve XI: Shoulder shrug is intact bilaterally.  Cranial Nerve XII: Tongue is midline without evidence of atrophy or fasciculation.    Medical Decision Making    Data Review:   Lumbar MRI dated 7/20/2020 demonstrates some diffuse degenerative disc disease and facet arthropathy.  There is severe spinal stenosis at L3-4 and L4-5 with moderate to severe stenosis at L5-S1.  Some of this is due to disc protrusion at L4-5.  Mild canal narrowing is noted at L3-4.  She does have some scoliosis.    Thoracic MRI study dated 11/7/2020 demonstrates a capacious thoracic canal in the setting of her significant kyphosis.    Diagnosis:   1.  Lumbar spinal stenosis with neurogenic claudication.  2.  Thoracolumbar deformity.  3.  Mechanical low back pain.    Treatment Options:   I discussed treatment options with the patient.  One option would be multilevel lumbar laminectomy to address her neurogenic claudication.  This would help substantially with her ability to stand and walk.  It would not be a panacea for all of her axial back pain.  The spinal cord stimulator would be another option to help with her pain.  After a lengthy discussion with the patient and her son we have elected to proceed with decompressive laminectomies at L3-4, L4-5, and L5-S1.  The nature of the  procedure as well as the potential risks, complications, limitations, and alternatives to the procedure were discussed at length with the patient and the patient has agreed to proceed with surgery.  She does have a history of carotid occlusive disease and is on aspirin.  She may continue that in the perioperative period.       Diagnosis Plan   1. Lumbar stenosis with neurogenic claudication         Scribed for Jae Graves MD by ENOC Borrego 11/11/2020 14:45 EST      I, Dr. Graves, personally performed the services described in the documentation, as scribed in my presence, and it is both accurate and complete.

## 2020-11-19 ENCOUNTER — APPOINTMENT (OUTPATIENT)
Dept: PREADMISSION TESTING | Facility: HOSPITAL | Age: 80
End: 2020-11-19

## 2020-11-19 VITALS — WEIGHT: 113.8 LBS | BODY MASS INDEX: 22.94 KG/M2 | HEIGHT: 59 IN

## 2020-11-19 DIAGNOSIS — M48.062 LUMBAR STENOSIS WITH NEUROGENIC CLAUDICATION: ICD-10-CM

## 2020-11-19 LAB
DEPRECATED RDW RBC AUTO: 45.9 FL (ref 37–54)
ERYTHROCYTE [DISTWIDTH] IN BLOOD BY AUTOMATED COUNT: 13 % (ref 12.3–15.4)
HBA1C MFR BLD: 7.2 % (ref 4.8–5.6)
HCT VFR BLD AUTO: 36.5 % (ref 34–46.6)
HGB BLD-MCNC: 11.5 G/DL (ref 12–15.9)
MCH RBC QN AUTO: 30.3 PG (ref 26.6–33)
MCHC RBC AUTO-ENTMCNC: 31.5 G/DL (ref 31.5–35.7)
MCV RBC AUTO: 96.1 FL (ref 79–97)
PLATELET # BLD AUTO: 257 10*3/MM3 (ref 140–450)
PMV BLD AUTO: 10.7 FL (ref 6–12)
QT INTERVAL: 388 MS
QTC INTERVAL: 421 MS
RBC # BLD AUTO: 3.8 10*6/MM3 (ref 3.77–5.28)
WBC # BLD AUTO: 6.38 10*3/MM3 (ref 3.4–10.8)

## 2020-11-19 PROCEDURE — 83036 HEMOGLOBIN GLYCOSYLATED A1C: CPT

## 2020-11-19 PROCEDURE — 87081 CULTURE SCREEN ONLY: CPT

## 2020-11-19 PROCEDURE — 93005 ELECTROCARDIOGRAM TRACING: CPT

## 2020-11-19 PROCEDURE — 85027 COMPLETE CBC AUTOMATED: CPT

## 2020-11-19 PROCEDURE — 36415 COLL VENOUS BLD VENIPUNCTURE: CPT

## 2020-11-19 PROCEDURE — 93010 ELECTROCARDIOGRAM REPORT: CPT | Performed by: INTERNAL MEDICINE

## 2020-11-20 LAB — MRSA SPEC QL CULT: NORMAL

## 2020-11-22 ENCOUNTER — APPOINTMENT (OUTPATIENT)
Dept: PREADMISSION TESTING | Facility: HOSPITAL | Age: 80
End: 2020-11-22

## 2020-11-22 PROCEDURE — U0004 COV-19 TEST NON-CDC HGH THRU: HCPCS

## 2020-11-22 PROCEDURE — C9803 HOPD COVID-19 SPEC COLLECT: HCPCS

## 2020-11-23 ENCOUNTER — ANESTHESIA EVENT (OUTPATIENT)
Dept: PERIOP | Facility: HOSPITAL | Age: 80
End: 2020-11-23

## 2020-11-23 LAB — SARS-COV-2 RNA RESP QL NAA+PROBE: NOT DETECTED

## 2020-11-23 RX ORDER — FAMOTIDINE 10 MG/ML
20 INJECTION, SOLUTION INTRAVENOUS ONCE
Status: CANCELLED | OUTPATIENT
Start: 2020-11-23 | End: 2020-11-23

## 2020-11-24 ENCOUNTER — ANESTHESIA (OUTPATIENT)
Dept: PERIOP | Facility: HOSPITAL | Age: 80
End: 2020-11-24

## 2020-11-24 ENCOUNTER — HOSPITAL ENCOUNTER (OUTPATIENT)
Facility: HOSPITAL | Age: 80
Discharge: HOME | End: 2020-11-26
Attending: NEUROLOGICAL SURGERY | Admitting: NEUROLOGICAL SURGERY

## 2020-11-24 ENCOUNTER — APPOINTMENT (OUTPATIENT)
Dept: GENERAL RADIOLOGY | Facility: HOSPITAL | Age: 80
End: 2020-11-24

## 2020-11-24 DIAGNOSIS — M48.062 LUMBAR STENOSIS WITH NEUROGENIC CLAUDICATION: ICD-10-CM

## 2020-11-24 LAB
GLUCOSE BLDC GLUCOMTR-MCNC: 110 MG/DL (ref 70–130)
POTASSIUM SERPL-SCNC: 4.3 MMOL/L (ref 3.5–5.2)

## 2020-11-24 PROCEDURE — 25010000002 HYDROMORPHONE PER 4 MG: Performed by: NURSE ANESTHETIST, CERTIFIED REGISTERED

## 2020-11-24 PROCEDURE — 63710000001 ROSUVASTATIN 20 MG TABLET: Performed by: NEUROLOGICAL SURGERY

## 2020-11-24 PROCEDURE — A9270 NON-COVERED ITEM OR SERVICE: HCPCS | Performed by: NEUROLOGICAL SURGERY

## 2020-11-24 PROCEDURE — 25010000002 DEXAMETHASONE PER 1 MG: Performed by: NURSE ANESTHETIST, CERTIFIED REGISTERED

## 2020-11-24 PROCEDURE — 63030 LAMOT DCMPRN NRV RT 1 LMBR: CPT | Performed by: PHYSICIAN ASSISTANT

## 2020-11-24 PROCEDURE — 82962 GLUCOSE BLOOD TEST: CPT

## 2020-11-24 PROCEDURE — 63710000001 GABAPENTIN 100 MG CAPSULE: Performed by: NEUROLOGICAL SURGERY

## 2020-11-24 PROCEDURE — 25010000002 FENTANYL CITRATE (PF) 100 MCG/2ML SOLUTION: Performed by: NURSE ANESTHETIST, CERTIFIED REGISTERED

## 2020-11-24 PROCEDURE — 63048 LAM FACETEC &FORAMOT EA ADDL: CPT | Performed by: NEUROLOGICAL SURGERY

## 2020-11-24 PROCEDURE — 63710000001 PHENOL 1.4 % LIQUID 177 ML BOTTLE: Performed by: PHYSICIAN ASSISTANT

## 2020-11-24 PROCEDURE — 63047 LAM FACETEC & FORAMOT LUMBAR: CPT | Performed by: NEUROLOGICAL SURGERY

## 2020-11-24 PROCEDURE — 63710000001 POTASSIUM PHOSPHATE (MONOBASIC) 500 MG TABLET: Performed by: NEUROLOGICAL SURGERY

## 2020-11-24 PROCEDURE — 25010000002 PROPOFOL 10 MG/ML EMULSION: Performed by: NURSE ANESTHETIST, CERTIFIED REGISTERED

## 2020-11-24 PROCEDURE — 25010000002 ONDANSETRON PER 1 MG: Performed by: NURSE ANESTHETIST, CERTIFIED REGISTERED

## 2020-11-24 PROCEDURE — 25010000002 CEFAZOLIN PER 500 MG: Performed by: NEUROLOGICAL SURGERY

## 2020-11-24 PROCEDURE — 84132 ASSAY OF SERUM POTASSIUM: CPT | Performed by: ANESTHESIOLOGY

## 2020-11-24 PROCEDURE — A9270 NON-COVERED ITEM OR SERVICE: HCPCS | Performed by: ANESTHESIOLOGY

## 2020-11-24 PROCEDURE — 63048 LAM FACETEC &FORAMOT EA ADDL: CPT | Performed by: PHYSICIAN ASSISTANT

## 2020-11-24 PROCEDURE — 63710000001 FAMOTIDINE 20 MG TABLET: Performed by: ANESTHESIOLOGY

## 2020-11-24 PROCEDURE — 25010000003 CEFAZOLIN IN DEXTROSE 2-4 GM/100ML-% SOLUTION: Performed by: NEUROLOGICAL SURGERY

## 2020-11-24 PROCEDURE — 63047 LAM FACETEC & FORAMOT LUMBAR: CPT | Performed by: PHYSICIAN ASSISTANT

## 2020-11-24 PROCEDURE — A9270 NON-COVERED ITEM OR SERVICE: HCPCS | Performed by: PHYSICIAN ASSISTANT

## 2020-11-24 PROCEDURE — 63030 LAMOT DCMPRN NRV RT 1 LMBR: CPT | Performed by: NEUROLOGICAL SURGERY

## 2020-11-24 PROCEDURE — 63710000001 OXYCODONE-ACETAMINOPHEN 7.5-325 MG TABLET: Performed by: NEUROLOGICAL SURGERY

## 2020-11-24 PROCEDURE — 76000 FLUOROSCOPY <1 HR PHYS/QHP: CPT

## 2020-11-24 PROCEDURE — 25010000002 NEOSTIGMINE 10 MG/10ML SOLUTION: Performed by: NURSE ANESTHETIST, CERTIFIED REGISTERED

## 2020-11-24 DEVICE — IMPLANTABLE DEVICE
Type: IMPLANTABLE DEVICE | Site: SPINE LUMBAR | Status: FUNCTIONAL
Brand: SURGIFOAM® ABSORBABLE GELATIN SPONGE, U.S.P.

## 2020-11-24 DEVICE — FLOSEAL HEMOSTATIC MATRIX, 10ML
Type: IMPLANTABLE DEVICE | Site: SPINE LUMBAR | Status: FUNCTIONAL
Brand: FLOSEAL HEMOSTATIC MATRIX

## 2020-11-24 RX ORDER — PANTOPRAZOLE SODIUM 40 MG/1
40 TABLET, DELAYED RELEASE ORAL EVERY MORNING
Status: DISCONTINUED | OUTPATIENT
Start: 2020-11-25 | End: 2020-11-26 | Stop reason: HOSPADM

## 2020-11-24 RX ORDER — ACETAMINOPHEN 325 MG/1
650 TABLET ORAL EVERY 4 HOURS PRN
Status: DISCONTINUED | OUTPATIENT
Start: 2020-11-24 | End: 2020-11-26 | Stop reason: HOSPADM

## 2020-11-24 RX ORDER — NALOXONE HCL 0.4 MG/ML
0.4 VIAL (ML) INJECTION
Status: DISCONTINUED | OUTPATIENT
Start: 2020-11-24 | End: 2020-11-26 | Stop reason: HOSPADM

## 2020-11-24 RX ORDER — GLYCOPYRROLATE 0.2 MG/ML
INJECTION INTRAMUSCULAR; INTRAVENOUS AS NEEDED
Status: DISCONTINUED | OUTPATIENT
Start: 2020-11-24 | End: 2020-11-24 | Stop reason: SURG

## 2020-11-24 RX ORDER — CALCITRIOL 0.25 UG/1
0.25 CAPSULE, LIQUID FILLED ORAL 2 TIMES WEEKLY
Status: DISCONTINUED | OUTPATIENT
Start: 2020-11-26 | End: 2020-11-26 | Stop reason: HOSPADM

## 2020-11-24 RX ORDER — DEXAMETHASONE SODIUM PHOSPHATE 4 MG/ML
INJECTION, SOLUTION INTRA-ARTICULAR; INTRALESIONAL; INTRAMUSCULAR; INTRAVENOUS; SOFT TISSUE AS NEEDED
Status: DISCONTINUED | OUTPATIENT
Start: 2020-11-24 | End: 2020-11-24 | Stop reason: SURG

## 2020-11-24 RX ORDER — LEVOTHYROXINE SODIUM 0.07 MG/1
75 TABLET ORAL DAILY
Status: DISCONTINUED | OUTPATIENT
Start: 2020-11-25 | End: 2020-11-26 | Stop reason: HOSPADM

## 2020-11-24 RX ORDER — LIDOCAINE HYDROCHLORIDE 10 MG/ML
0.5 INJECTION, SOLUTION EPIDURAL; INFILTRATION; INTRACAUDAL; PERINEURAL ONCE AS NEEDED
Status: COMPLETED | OUTPATIENT
Start: 2020-11-24 | End: 2020-11-24

## 2020-11-24 RX ORDER — MAGNESIUM HYDROXIDE 1200 MG/15ML
LIQUID ORAL AS NEEDED
Status: DISCONTINUED | OUTPATIENT
Start: 2020-11-24 | End: 2020-11-24 | Stop reason: HOSPADM

## 2020-11-24 RX ORDER — ONDANSETRON 4 MG/1
4 TABLET, FILM COATED ORAL EVERY 6 HOURS PRN
Status: DISCONTINUED | OUTPATIENT
Start: 2020-11-24 | End: 2020-11-26 | Stop reason: HOSPADM

## 2020-11-24 RX ORDER — MORPHINE SULFATE 4 MG/ML
2 INJECTION, SOLUTION INTRAMUSCULAR; INTRAVENOUS EVERY 4 HOURS PRN
Status: DISCONTINUED | OUTPATIENT
Start: 2020-11-24 | End: 2020-11-26 | Stop reason: HOSPADM

## 2020-11-24 RX ORDER — ASPIRIN 325 MG
325 TABLET, DELAYED RELEASE (ENTERIC COATED) ORAL DAILY
Status: DISCONTINUED | OUTPATIENT
Start: 2020-11-25 | End: 2020-11-26 | Stop reason: HOSPADM

## 2020-11-24 RX ORDER — FENTANYL CITRATE 50 UG/ML
50 INJECTION, SOLUTION INTRAMUSCULAR; INTRAVENOUS
Status: DISCONTINUED | OUTPATIENT
Start: 2020-11-24 | End: 2020-11-24 | Stop reason: SDUPTHER

## 2020-11-24 RX ORDER — SODIUM CHLORIDE 0.9 % (FLUSH) 0.9 %
10 SYRINGE (ML) INJECTION EVERY 12 HOURS SCHEDULED
Status: DISCONTINUED | OUTPATIENT
Start: 2020-11-24 | End: 2020-11-24 | Stop reason: HOSPADM

## 2020-11-24 RX ORDER — BUPIVACAINE HYDROCHLORIDE AND EPINEPHRINE 2.5; 5 MG/ML; UG/ML
INJECTION, SOLUTION EPIDURAL; INFILTRATION; INTRACAUDAL; PERINEURAL AS NEEDED
Status: DISCONTINUED | OUTPATIENT
Start: 2020-11-24 | End: 2020-11-24 | Stop reason: HOSPADM

## 2020-11-24 RX ORDER — PROMETHAZINE HYDROCHLORIDE 12.5 MG/1
12.5 SUPPOSITORY RECTAL EVERY 6 HOURS PRN
Status: DISCONTINUED | OUTPATIENT
Start: 2020-11-24 | End: 2020-11-26 | Stop reason: HOSPADM

## 2020-11-24 RX ORDER — ROCURONIUM BROMIDE 10 MG/ML
INJECTION, SOLUTION INTRAVENOUS AS NEEDED
Status: DISCONTINUED | OUTPATIENT
Start: 2020-11-24 | End: 2020-11-24 | Stop reason: SURG

## 2020-11-24 RX ORDER — LOPERAMIDE HYDROCHLORIDE 2 MG/1
2 CAPSULE ORAL 3 TIMES DAILY PRN
Status: DISCONTINUED | OUTPATIENT
Start: 2020-11-24 | End: 2020-11-26 | Stop reason: HOSPADM

## 2020-11-24 RX ORDER — PROPOFOL 10 MG/ML
VIAL (ML) INTRAVENOUS AS NEEDED
Status: DISCONTINUED | OUTPATIENT
Start: 2020-11-24 | End: 2020-11-24 | Stop reason: SURG

## 2020-11-24 RX ORDER — FAMOTIDINE 20 MG/1
20 TABLET, FILM COATED ORAL ONCE
Status: COMPLETED | OUTPATIENT
Start: 2020-11-24 | End: 2020-11-24

## 2020-11-24 RX ORDER — MORPHINE SULFATE 4 MG/ML
4 INJECTION, SOLUTION INTRAMUSCULAR; INTRAVENOUS EVERY 4 HOURS PRN
Status: DISCONTINUED | OUTPATIENT
Start: 2020-11-24 | End: 2020-11-26 | Stop reason: HOSPADM

## 2020-11-24 RX ORDER — CEFAZOLIN SODIUM 2 G/100ML
2 INJECTION, SOLUTION INTRAVENOUS EVERY 8 HOURS
Status: COMPLETED | OUTPATIENT
Start: 2020-11-24 | End: 2020-11-25

## 2020-11-24 RX ORDER — HYDROMORPHONE HYDROCHLORIDE 1 MG/ML
0.5 INJECTION, SOLUTION INTRAMUSCULAR; INTRAVENOUS; SUBCUTANEOUS
Status: DISCONTINUED | OUTPATIENT
Start: 2020-11-24 | End: 2020-11-24 | Stop reason: HOSPADM

## 2020-11-24 RX ORDER — SODIUM CHLORIDE 0.9 % (FLUSH) 0.9 %
3 SYRINGE (ML) INJECTION EVERY 12 HOURS SCHEDULED
Status: DISCONTINUED | OUTPATIENT
Start: 2020-11-24 | End: 2020-11-26 | Stop reason: HOSPADM

## 2020-11-24 RX ORDER — SODIUM CHLORIDE 0.9 % (FLUSH) 0.9 %
10 SYRINGE (ML) INJECTION AS NEEDED
Status: DISCONTINUED | OUTPATIENT
Start: 2020-11-24 | End: 2020-11-24 | Stop reason: HOSPADM

## 2020-11-24 RX ORDER — ONDANSETRON 2 MG/ML
4 INJECTION INTRAMUSCULAR; INTRAVENOUS EVERY 6 HOURS PRN
Status: DISCONTINUED | OUTPATIENT
Start: 2020-11-24 | End: 2020-11-26 | Stop reason: HOSPADM

## 2020-11-24 RX ORDER — CEFAZOLIN SODIUM 2 G/100ML
2 INJECTION, SOLUTION INTRAVENOUS ONCE
Status: COMPLETED | OUTPATIENT
Start: 2020-11-24 | End: 2020-11-24

## 2020-11-24 RX ORDER — LIDOCAINE HYDROCHLORIDE 10 MG/ML
INJECTION, SOLUTION EPIDURAL; INFILTRATION; INTRACAUDAL; PERINEURAL AS NEEDED
Status: DISCONTINUED | OUTPATIENT
Start: 2020-11-24 | End: 2020-11-24 | Stop reason: SURG

## 2020-11-24 RX ORDER — SODIUM CHLORIDE 9 MG/ML
INJECTION, SOLUTION INTRAVENOUS AS NEEDED
Status: DISCONTINUED | OUTPATIENT
Start: 2020-11-24 | End: 2020-11-24 | Stop reason: HOSPADM

## 2020-11-24 RX ORDER — FENTANYL CITRATE 50 UG/ML
50 INJECTION, SOLUTION INTRAMUSCULAR; INTRAVENOUS
Status: DISCONTINUED | OUTPATIENT
Start: 2020-11-24 | End: 2020-11-24 | Stop reason: HOSPADM

## 2020-11-24 RX ORDER — SODIUM CHLORIDE, SODIUM LACTATE, POTASSIUM CHLORIDE, CALCIUM CHLORIDE 600; 310; 30; 20 MG/100ML; MG/100ML; MG/100ML; MG/100ML
9 INJECTION, SOLUTION INTRAVENOUS CONTINUOUS
Status: DISCONTINUED | OUTPATIENT
Start: 2020-11-24 | End: 2020-11-24

## 2020-11-24 RX ORDER — FENTANYL CITRATE 50 UG/ML
INJECTION, SOLUTION INTRAMUSCULAR; INTRAVENOUS AS NEEDED
Status: DISCONTINUED | OUTPATIENT
Start: 2020-11-24 | End: 2020-11-24 | Stop reason: SURG

## 2020-11-24 RX ORDER — HYDROMORPHONE HYDROCHLORIDE 1 MG/ML
0.5 INJECTION, SOLUTION INTRAMUSCULAR; INTRAVENOUS; SUBCUTANEOUS
Status: DISCONTINUED | OUTPATIENT
Start: 2020-11-24 | End: 2020-11-24 | Stop reason: SDUPTHER

## 2020-11-24 RX ORDER — NEOSTIGMINE METHYLSULFATE 1 MG/ML
INJECTION, SOLUTION INTRAVENOUS AS NEEDED
Status: DISCONTINUED | OUTPATIENT
Start: 2020-11-24 | End: 2020-11-24 | Stop reason: SURG

## 2020-11-24 RX ORDER — GABAPENTIN 100 MG/1
100 CAPSULE ORAL NIGHTLY
Status: DISCONTINUED | OUTPATIENT
Start: 2020-11-24 | End: 2020-11-26 | Stop reason: HOSPADM

## 2020-11-24 RX ORDER — TRIAMTERENE AND HYDROCHLOROTHIAZIDE 37.5; 25 MG/1; MG/1
1 TABLET ORAL DAILY
Status: DISCONTINUED | OUTPATIENT
Start: 2020-11-25 | End: 2020-11-26 | Stop reason: HOSPADM

## 2020-11-24 RX ORDER — DIPHENHYDRAMINE HCL 25 MG
25 CAPSULE ORAL NIGHTLY PRN
Status: DISCONTINUED | OUTPATIENT
Start: 2020-11-24 | End: 2020-11-26 | Stop reason: HOSPADM

## 2020-11-24 RX ORDER — SODIUM CHLORIDE 0.9 % (FLUSH) 0.9 %
10 SYRINGE (ML) INJECTION AS NEEDED
Status: DISCONTINUED | OUTPATIENT
Start: 2020-11-24 | End: 2020-11-26 | Stop reason: HOSPADM

## 2020-11-24 RX ORDER — PROMETHAZINE HYDROCHLORIDE 12.5 MG/1
12.5 TABLET ORAL EVERY 6 HOURS PRN
Status: DISCONTINUED | OUTPATIENT
Start: 2020-11-24 | End: 2020-11-26 | Stop reason: HOSPADM

## 2020-11-24 RX ORDER — OXYCODONE AND ACETAMINOPHEN 7.5; 325 MG/1; MG/1
1 TABLET ORAL EVERY 4 HOURS PRN
Status: DISCONTINUED | OUTPATIENT
Start: 2020-11-24 | End: 2020-11-26 | Stop reason: HOSPADM

## 2020-11-24 RX ORDER — SODIUM CHLORIDE, SODIUM LACTATE, POTASSIUM CHLORIDE, CALCIUM CHLORIDE 600; 310; 30; 20 MG/100ML; MG/100ML; MG/100ML; MG/100ML
75 INJECTION, SOLUTION INTRAVENOUS CONTINUOUS
Status: DISCONTINUED | OUTPATIENT
Start: 2020-11-24 | End: 2020-11-25

## 2020-11-24 RX ORDER — ONDANSETRON 2 MG/ML
INJECTION INTRAMUSCULAR; INTRAVENOUS AS NEEDED
Status: DISCONTINUED | OUTPATIENT
Start: 2020-11-24 | End: 2020-11-24 | Stop reason: SURG

## 2020-11-24 RX ORDER — ROSUVASTATIN CALCIUM 20 MG/1
40 TABLET, COATED ORAL DAILY
Status: DISCONTINUED | OUTPATIENT
Start: 2020-11-24 | End: 2020-11-26 | Stop reason: HOSPADM

## 2020-11-24 RX ADMIN — HYDROMORPHONE HYDROCHLORIDE 0.5 MG: 1 INJECTION, SOLUTION INTRAMUSCULAR; INTRAVENOUS; SUBCUTANEOUS at 16:00

## 2020-11-24 RX ADMIN — LIDOCAINE HYDROCHLORIDE 50 MG: 10 INJECTION, SOLUTION EPIDURAL; INFILTRATION; INTRACAUDAL; PERINEURAL at 13:50

## 2020-11-24 RX ADMIN — FENTANYL CITRATE 50 MCG: 0.05 INJECTION, SOLUTION INTRAMUSCULAR; INTRAVENOUS at 15:50

## 2020-11-24 RX ADMIN — GABAPENTIN 100 MG: 100 CAPSULE ORAL at 22:08

## 2020-11-24 RX ADMIN — POTASSIUM PHOSPHATE, MONOBASIC 500 MG: 500 TABLET, SOLUBLE ORAL at 17:37

## 2020-11-24 RX ADMIN — OXYCODONE HYDROCHLORIDE AND ACETAMINOPHEN 1 TABLET: 7.5; 325 TABLET ORAL at 22:08

## 2020-11-24 RX ADMIN — PHENOL 2 SPRAY: 1.5 LIQUID ORAL at 22:10

## 2020-11-24 RX ADMIN — ROSUVASTATIN CALCIUM 40 MG: 20 TABLET, COATED ORAL at 20:39

## 2020-11-24 RX ADMIN — SODIUM CHLORIDE, POTASSIUM CHLORIDE, SODIUM LACTATE AND CALCIUM CHLORIDE 75 ML/HR: 600; 310; 30; 20 INJECTION, SOLUTION INTRAVENOUS at 17:38

## 2020-11-24 RX ADMIN — CEFAZOLIN 2 G: 10 INJECTION, POWDER, FOR SOLUTION INTRAVENOUS at 20:39

## 2020-11-24 RX ADMIN — FAMOTIDINE 20 MG: 20 TABLET, FILM COATED ORAL at 12:49

## 2020-11-24 RX ADMIN — FENTANYL CITRATE 100 MCG: 50 INJECTION, SOLUTION INTRAMUSCULAR; INTRAVENOUS at 13:50

## 2020-11-24 RX ADMIN — ROCURONIUM BROMIDE 30 MG: 10 INJECTION INTRAVENOUS at 13:50

## 2020-11-24 RX ADMIN — FENTANYL CITRATE 50 MCG: 0.05 INJECTION, SOLUTION INTRAMUSCULAR; INTRAVENOUS at 15:40

## 2020-11-24 RX ADMIN — DEXAMETHASONE SODIUM PHOSPHATE 8 MG: 4 INJECTION, SOLUTION INTRAMUSCULAR; INTRAVENOUS at 13:56

## 2020-11-24 RX ADMIN — SODIUM CHLORIDE, POTASSIUM CHLORIDE, SODIUM LACTATE AND CALCIUM CHLORIDE 9 ML/HR: 600; 310; 30; 20 INJECTION, SOLUTION INTRAVENOUS at 12:24

## 2020-11-24 RX ADMIN — OXYCODONE HYDROCHLORIDE AND ACETAMINOPHEN 1 TABLET: 7.5; 325 TABLET ORAL at 17:37

## 2020-11-24 RX ADMIN — PROPOFOL 150 MG: 10 INJECTION, EMULSION INTRAVENOUS at 13:50

## 2020-11-24 RX ADMIN — LIDOCAINE HYDROCHLORIDE 0.2 ML: 10 INJECTION, SOLUTION EPIDURAL; INFILTRATION; INTRACAUDAL; PERINEURAL at 12:24

## 2020-11-24 RX ADMIN — NEOSTIGMINE 3 MG: 1 INJECTION INTRAVENOUS at 15:20

## 2020-11-24 RX ADMIN — CEFAZOLIN SODIUM 2 G: 2 INJECTION, SOLUTION INTRAVENOUS at 13:45

## 2020-11-24 RX ADMIN — GLYCOPYRROLATE 0.4 MG: 0.2 INJECTION INTRAMUSCULAR; INTRAVENOUS at 15:20

## 2020-11-24 RX ADMIN — ONDANSETRON 4 MG: 2 INJECTION INTRAMUSCULAR; INTRAVENOUS at 13:56

## 2020-11-24 RX ADMIN — EPHEDRINE SULFATE 10 MG: 50 INJECTION INTRAMUSCULAR; INTRAVENOUS; SUBCUTANEOUS at 13:56

## 2020-11-24 NOTE — ANESTHESIA PREPROCEDURE EVALUATION
Anesthesia Evaluation     Patient summary reviewed and Nursing notes reviewed                Airway   Mallampati: II  TM distance: >3 FB  Neck ROM: full  No difficulty expected  Dental - normal exam   (+) upper dentures and partials    Pulmonary - negative pulmonary ROS and normal exam   Cardiovascular - normal exam    (+) hypertension, valvular problems/murmurs AS, PVD, hyperlipidemia,     ROS comment:   Echo 11/19: normal EF, mild AS (SAMANTHA 1.3, mean 9, max 16)  Stress test 11/19: no ischemia, low risk study    Neuro/Psych  (+) CVA,     GI/Hepatic/Renal/Endo    (+)  GERD,  renal disease, diabetes mellitus, thyroid problem hypothyroidism    Musculoskeletal     Abdominal  - normal exam    Bowel sounds: normal.   Substance History - negative use     OB/GYN negative ob/gyn ROS         Other   arthritis,                    Anesthesia Plan    ASA 3     general     intravenous induction     Anesthetic plan, all risks, benefits, and alternatives have been provided, discussed and informed consent has been obtained with: patient.    Plan discussed with CRNA.

## 2020-11-24 NOTE — ANESTHESIA PROCEDURE NOTES
Airway  Urgency: elective    Date/Time: 11/24/2020 1:51 PM  Airway not difficult    General Information and Staff    Patient location during procedure: OR  CRNA: Bill Longoria CRNA    Indications and Patient Condition  Indications for airway management: airway protection    Preoxygenated: yes  MILS not maintained throughout  Mask difficulty assessment: 1 - vent by mask    Final Airway Details  Final airway type: endotracheal airway      Successful airway: ETT  Cuffed: yes   Successful intubation technique: direct laryngoscopy  Facilitating devices/methods: intubating stylet  Endotracheal tube insertion site: oral  Blade: Jaylene  Blade size: 3  ETT size (mm): 7.5  Cormack-Lehane Classification: grade I - full view of glottis  Placement verified by: chest auscultation and capnometry   Measured from: lips  ETT/EBT  to lips (cm): 20  Number of attempts at approach: 1  Assessment: lips, teeth, and gum same as pre-op and atraumatic intubation    Additional Comments  Negative epigastric sounds, Breath sound equal bilaterally with symmetric chest rise and fall

## 2020-11-25 PROCEDURE — 63710000001 OXYCODONE-ACETAMINOPHEN 7.5-325 MG TABLET: Performed by: NEUROLOGICAL SURGERY

## 2020-11-25 PROCEDURE — A9270 NON-COVERED ITEM OR SERVICE: HCPCS | Performed by: NEUROLOGICAL SURGERY

## 2020-11-25 PROCEDURE — 63710000001 PANTOPRAZOLE 40 MG TABLET DELAYED-RELEASE: Performed by: NEUROLOGICAL SURGERY

## 2020-11-25 PROCEDURE — 99024 POSTOP FOLLOW-UP VISIT: CPT | Performed by: NEUROLOGICAL SURGERY

## 2020-11-25 PROCEDURE — 63710000001 ASPIRIN EC 325 MG TABLET DELAYED-RELEASE: Performed by: NEUROLOGICAL SURGERY

## 2020-11-25 PROCEDURE — 63710000001 ROSUVASTATIN 20 MG TABLET: Performed by: NEUROLOGICAL SURGERY

## 2020-11-25 PROCEDURE — 63710000001 LEVOTHYROXINE 75 MCG TABLET: Performed by: NEUROLOGICAL SURGERY

## 2020-11-25 PROCEDURE — 25010000002 CEFAZOLIN PER 500 MG: Performed by: NEUROLOGICAL SURGERY

## 2020-11-25 PROCEDURE — A9270 NON-COVERED ITEM OR SERVICE: HCPCS | Performed by: PHYSICIAN ASSISTANT

## 2020-11-25 PROCEDURE — 63710000001 POTASSIUM PHOSPHATE (MONOBASIC) 500 MG TABLET: Performed by: NEUROLOGICAL SURGERY

## 2020-11-25 PROCEDURE — 63710000001 PANTOPRAZOLE 40 MG TABLET DELAYED-RELEASE: Performed by: PHYSICIAN ASSISTANT

## 2020-11-25 PROCEDURE — 63710000001 GABAPENTIN 100 MG CAPSULE: Performed by: NEUROLOGICAL SURGERY

## 2020-11-25 RX ORDER — PANTOPRAZOLE SODIUM 40 MG/1
40 TABLET, DELAYED RELEASE ORAL ONCE
Status: COMPLETED | OUTPATIENT
Start: 2020-11-25 | End: 2020-11-25

## 2020-11-25 RX ADMIN — ROSUVASTATIN CALCIUM 40 MG: 20 TABLET, COATED ORAL at 20:08

## 2020-11-25 RX ADMIN — ASPIRIN 325 MG: 325 TABLET, COATED ORAL at 08:49

## 2020-11-25 RX ADMIN — PANTOPRAZOLE SODIUM 40 MG: 40 TABLET, DELAYED RELEASE ORAL at 04:42

## 2020-11-25 RX ADMIN — CEFAZOLIN 2 G: 10 INJECTION, POWDER, FOR SOLUTION INTRAVENOUS at 04:43

## 2020-11-25 RX ADMIN — LEVOTHYROXINE SODIUM 75 MCG: 75 TABLET ORAL at 04:42

## 2020-11-25 RX ADMIN — SODIUM CHLORIDE, PRESERVATIVE FREE 3 ML: 5 INJECTION INTRAVENOUS at 20:08

## 2020-11-25 RX ADMIN — SODIUM CHLORIDE, PRESERVATIVE FREE 3 ML: 5 INJECTION INTRAVENOUS at 08:50

## 2020-11-25 RX ADMIN — POTASSIUM PHOSPHATE, MONOBASIC 500 MG: 500 TABLET, SOLUBLE ORAL at 08:49

## 2020-11-25 RX ADMIN — OXYCODONE HYDROCHLORIDE AND ACETAMINOPHEN 1 TABLET: 7.5; 325 TABLET ORAL at 12:04

## 2020-11-25 RX ADMIN — SODIUM CHLORIDE, POTASSIUM CHLORIDE, SODIUM LACTATE AND CALCIUM CHLORIDE 75 ML/HR: 600; 310; 30; 20 INJECTION, SOLUTION INTRAVENOUS at 04:43

## 2020-11-25 RX ADMIN — OXYCODONE HYDROCHLORIDE AND ACETAMINOPHEN 1 TABLET: 7.5; 325 TABLET ORAL at 04:42

## 2020-11-25 RX ADMIN — OXYCODONE HYDROCHLORIDE AND ACETAMINOPHEN 1 TABLET: 7.5; 325 TABLET ORAL at 19:32

## 2020-11-25 RX ADMIN — GABAPENTIN 100 MG: 100 CAPSULE ORAL at 22:12

## 2020-11-25 RX ADMIN — PANTOPRAZOLE SODIUM 40 MG: 40 TABLET, DELAYED RELEASE ORAL at 19:32

## 2020-11-26 VITALS
RESPIRATION RATE: 16 BRPM | BODY MASS INDEX: 22.93 KG/M2 | HEART RATE: 80 BPM | OXYGEN SATURATION: 96 % | HEIGHT: 59 IN | SYSTOLIC BLOOD PRESSURE: 116 MMHG | WEIGHT: 113.76 LBS | DIASTOLIC BLOOD PRESSURE: 44 MMHG | TEMPERATURE: 98.1 F

## 2020-11-26 PROCEDURE — 63710000001 CALCITRIOL 0.25 MCG CAPSULE: Performed by: NEUROLOGICAL SURGERY

## 2020-11-26 PROCEDURE — 63710000001 PANTOPRAZOLE 40 MG TABLET DELAYED-RELEASE: Performed by: NEUROLOGICAL SURGERY

## 2020-11-26 PROCEDURE — 63710000001 POTASSIUM PHOSPHATE (MONOBASIC) 500 MG TABLET: Performed by: NEUROLOGICAL SURGERY

## 2020-11-26 PROCEDURE — 63710000001 TRIAMTERENE-HYDROCHLOROTHIAZIDE 37.5-25 MG TABLET: Performed by: NEUROLOGICAL SURGERY

## 2020-11-26 PROCEDURE — 97116 GAIT TRAINING THERAPY: CPT

## 2020-11-26 PROCEDURE — 63710000001 ASPIRIN EC 325 MG TABLET DELAYED-RELEASE: Performed by: NEUROLOGICAL SURGERY

## 2020-11-26 PROCEDURE — 63710000001 OXYCODONE-ACETAMINOPHEN 7.5-325 MG TABLET: Performed by: NEUROLOGICAL SURGERY

## 2020-11-26 PROCEDURE — 99024 POSTOP FOLLOW-UP VISIT: CPT | Performed by: NEUROLOGICAL SURGERY

## 2020-11-26 PROCEDURE — A9270 NON-COVERED ITEM OR SERVICE: HCPCS | Performed by: NEUROLOGICAL SURGERY

## 2020-11-26 PROCEDURE — 97162 PT EVAL MOD COMPLEX 30 MIN: CPT

## 2020-11-26 PROCEDURE — 63710000001 LEVOTHYROXINE 75 MCG TABLET: Performed by: NEUROLOGICAL SURGERY

## 2020-11-26 RX ORDER — OXYCODONE HYDROCHLORIDE AND ACETAMINOPHEN 5; 325 MG/1; MG/1
1 TABLET ORAL 3 TIMES DAILY PRN
Qty: 20 TABLET | Refills: 0 | Status: SHIPPED | OUTPATIENT
Start: 2020-11-26 | End: 2021-02-03

## 2020-11-26 RX ADMIN — TRIAMTERENE AND HYDROCHLOROTHIAZIDE 1 TABLET: 37.5; 25 TABLET ORAL at 09:00

## 2020-11-26 RX ADMIN — ASPIRIN 325 MG: 325 TABLET, COATED ORAL at 08:59

## 2020-11-26 RX ADMIN — PANTOPRAZOLE SODIUM 40 MG: 40 TABLET, DELAYED RELEASE ORAL at 06:38

## 2020-11-26 RX ADMIN — OXYCODONE HYDROCHLORIDE AND ACETAMINOPHEN 1 TABLET: 7.5; 325 TABLET ORAL at 10:45

## 2020-11-26 RX ADMIN — CALCITRIOL CAPSULES 0.25 MCG 0.25 MCG: 0.25 CAPSULE ORAL at 08:59

## 2020-11-26 RX ADMIN — LEVOTHYROXINE SODIUM 75 MCG: 75 TABLET ORAL at 04:54

## 2020-11-26 RX ADMIN — POTASSIUM PHOSPHATE, MONOBASIC 500 MG: 500 TABLET, SOLUBLE ORAL at 09:00

## 2020-11-26 RX ADMIN — SODIUM CHLORIDE, PRESERVATIVE FREE 3 ML: 5 INJECTION INTRAVENOUS at 09:00

## 2020-11-26 RX ADMIN — OXYCODONE HYDROCHLORIDE AND ACETAMINOPHEN 1 TABLET: 7.5; 325 TABLET ORAL at 04:54

## 2020-12-01 ENCOUNTER — TELEPHONE (OUTPATIENT)
Dept: NEUROSURGERY | Facility: CLINIC | Age: 80
End: 2020-12-01

## 2020-12-01 DIAGNOSIS — M48.062 LUMBAR STENOSIS WITH NEUROGENIC CLAUDICATION: Primary | ICD-10-CM

## 2020-12-01 NOTE — TELEPHONE ENCOUNTER
Provider:  Star  Caller: Brendan  Time of call:   1:01  Phone #:  [phone]  Surgery:  Lumbar laminectomy  Surgery Date:  11-24-20  Last visit:   same  Next visit: 12/18/20    MARINA MAR     Reason for call:    Patient's son called and said he was out of bandages.  He also wanted to know if it is normal that his mom still has some drainage.    The bandage has been changed 3 times since the surgery, so he couldn't really say how much it is draining daily.  It is a reddish/yellow drainage. He said the incision looks good.  There is no odor, redness and patient is afebrile.  He said the incision itself is a little puffy, but not bad.    I did let him know that we do not provide the bandages, but those could be picked up at any pharmacy, and he should change it daily.  I asked him to call back if there were any further issues.

## 2020-12-04 ENCOUNTER — TELEPHONE (OUTPATIENT)
Dept: ONCOLOGY | Facility: CLINIC | Age: 80
End: 2020-12-04

## 2020-12-07 ENCOUNTER — TELEPHONE (OUTPATIENT)
Dept: NEUROSURGERY | Facility: CLINIC | Age: 80
End: 2020-12-07

## 2020-12-07 RX ORDER — TIZANIDINE 4 MG/1
4 TABLET ORAL NIGHTLY PRN
Qty: 30 TABLET | Refills: 1 | Status: SHIPPED | OUTPATIENT
Start: 2020-12-07 | End: 2021-02-03 | Stop reason: SDUPTHER

## 2020-12-07 NOTE — TELEPHONE ENCOUNTER
I have called her in a muscle relaxer  - please let her know and have her call by Friday with update  - thanks

## 2020-12-07 NOTE — TELEPHONE ENCOUNTER
Called and advised patient a muscle relaxer was sent into her pharmacy. Patient verbalized understanding, and stated she would call Friday with an update.

## 2020-12-12 ENCOUNTER — HOSPITAL ENCOUNTER (OUTPATIENT)
Dept: MAMMOGRAPHY | Facility: HOSPITAL | Age: 80
Discharge: HOME | End: 2020-12-12
Admitting: FAMILY MEDICINE

## 2020-12-12 DIAGNOSIS — Z12.31 VISIT FOR SCREENING MAMMOGRAM: ICD-10-CM

## 2020-12-12 PROCEDURE — 77067 SCR MAMMO BI INCL CAD: CPT | Performed by: RADIOLOGY

## 2020-12-12 PROCEDURE — 77063 BREAST TOMOSYNTHESIS BI: CPT

## 2020-12-12 PROCEDURE — 77067 SCR MAMMO BI INCL CAD: CPT

## 2020-12-12 PROCEDURE — 77063 BREAST TOMOSYNTHESIS BI: CPT | Performed by: RADIOLOGY

## 2020-12-17 ENCOUNTER — OFFICE VISIT (OUTPATIENT)
Dept: ONCOLOGY | Facility: CLINIC | Age: 80
End: 2020-12-17

## 2020-12-17 VITALS — HEIGHT: 59 IN | WEIGHT: 109 LBS | BODY MASS INDEX: 21.97 KG/M2

## 2020-12-17 DIAGNOSIS — D63.8 ANEMIA WITH CHRONIC ILLNESS: Primary | ICD-10-CM

## 2020-12-17 PROCEDURE — 99213 OFFICE O/P EST LOW 20 MIN: CPT | Performed by: INTERNAL MEDICINE

## 2020-12-17 NOTE — PROGRESS NOTES
"PROBLEM LIST:    1.  Normocytic anemia  2.  Back pain followed by Dr. Williamson    TELEMEDICINE FOLLOW-UP     In order to limit face-to-face contact and enact \"social distancing\" in light of the COVID-19 outbreaks and in accordance to the recommendations per the CDC, WHO, and our individual department, Danay Gonzales  was contacted.  Telemedicine was used to screen the patient for needs and conduct the patient's regularly scheduled follow-up.     COVID-19 screening: female specifically denies fever, body aches, cough, difficulty breathing, sore throat, runny nose, recent travel, or known sick exposures.      Danay Gonzales was consented to undergo video televisit and was agreeable.      REASON FOR VISIT: Mild anemia    HISTORY OF PRESENT ILLNESS:   80 y.o.  female presents today for mild anemia.  She is 3 weeks out from her surgery on her back.  She reports that the pain is much better.  Clinically doing well.  Still has some weakness issues but seems to be improving.    Past medical history, social history and family history was reviewed and unchanged from prior visit.    Review of Systems:    Review of Systems - Oncology         Medications:        Current Outpatient Medications:   •  acetaminophen (TYLENOL) 500 MG tablet, Take 500 mg by mouth Every 6 (Six) Hours As Needed., Disp: , Rfl:   •  aspirin  MG tablet, Take 325 mg by mouth Daily. Patient states Dr. Graves told her not to stop prior to surgery, Disp: , Rfl:   •  calcitriol (ROCALTROL) 0.25 MCG capsule, Take 0.25 mcg by mouth 2 (Two) Times a Week., Disp: , Rfl:   •  calcium carbonate (OS-YASMANY) 600 MG tablet, Take 600 mg by mouth Daily., Disp: , Rfl:   •  Cholecalciferol (VITAMIN D3) 125 MCG (5000 UT) capsule capsule, Take 5,000 Units by mouth Daily., Disp: , Rfl:   •  gabapentin (NEURONTIN) 100 MG capsule, Take 100 mg by mouth At Night As Needed., Disp: , Rfl:   •  levothyroxine (SYNTHROID, LEVOTHROID) 75 MCG tablet, Take 75 mcg by mouth Daily., " Disp: , Rfl:   •  Loperamide HCl (IMODIUM A-D PO), Take  by mouth As Needed., Disp: , Rfl:   •  magnesium gluconate (MAGONATE) 500 MG tablet, Take 500 mg by mouth Daily., Disp: , Rfl:   •  Multiple Vitamin (MULTI-VITAMIN DAILY PO), Take  by mouth Daily., Disp: , Rfl:   •  omeprazole (priLOSEC) 40 MG capsule, Take 40 mg by mouth Daily., Disp: , Rfl:   •  ondansetron (ZOFRAN) 4 MG tablet, Take 4 mg by mouth Every 8 (Eight) Hours As Needed for Nausea or Vomiting., Disp: , Rfl:   •  oxyCODONE-acetaminophen (PERCOCET) 5-325 MG per tablet, Take 1 tablet by mouth 3 (Three) Times a Day As Needed (Pain)., Disp: 20 tablet, Rfl: 0  •  potassium phosphate, monobasic, (K-PHOS) 500 MG tablet, Take 500 mg by mouth Daily., Disp: , Rfl:   •  rosuvastatin (CRESTOR) 40 MG tablet, Take 40 mg by mouth Daily., Disp: , Rfl:   •  tiZANidine (Zanaflex) 4 MG tablet, Take 1 tablet by mouth At Night As Needed for Muscle Spasms., Disp: 30 tablet, Rfl: 1  •  triamterene-hydrochlorothiazide (DYAZIDE) 37.5-25 MG per capsule, Take 1 capsule by mouth Every Morning., Disp: , Rfl:   •  vitamin B-12 (CYANOCOBALAMIN) 1000 MCG tablet, Take 1,000 mcg by mouth Daily., Disp: , Rfl:     Pain Medications             acetaminophen (TYLENOL) 500 MG tablet Take 500 mg by mouth Every 6 (Six) Hours As Needed.    aspirin  MG tablet Take 325 mg by mouth Daily. Patient states Dr. Graves told her not to stop prior to surgery    gabapentin (NEURONTIN) 100 MG capsule Take 100 mg by mouth At Night As Needed.    oxyCODONE-acetaminophen (PERCOCET) 5-325 MG per tablet Take 1 tablet by mouth 3 (Three) Times a Day As Needed (Pain).    tiZANidine (Zanaflex) 4 MG tablet Take 1 tablet by mouth At Night As Needed for Muscle Spasms.             ALLERGIES:    Allergies   Allergen Reactions   • Erythromycin Rash         Physical Exam    VITAL SIGNS:  There were no vitals taken for this visit.    Wt Readings from Last 3 Encounters:   11/24/20 51.6 kg (113 lb 12.1 oz)   11/19/20  51.6 kg (113 lb 12.8 oz)   11/11/20 51.5 kg (113 lb 9.6 oz)       There is no height or weight on file to calculate BMI. There is no height or weight on file to calculate BSA.    There were no vitals filed for this visit.           RECENT LABS:    Lab Results   Component Value Date    HGB 11.5 (L) 11/19/2020    HCT 36.5 11/19/2020    MCV 96.1 11/19/2020     11/19/2020    WBC 6.38 11/19/2020    NEUTROABS 6.37 06/07/2014    LYMPHSABS 1.65 06/07/2014    MONOSABS 0.52 06/07/2014    EOSABS 0.09 (L) 06/07/2014    BASOSABS 0.03 06/07/2014       Lab Results   Component Value Date    GLUCOSE 137 (H) 06/07/2014    BUN 19 06/07/2014    CREATININE 1.50 (H) 03/22/2019     06/07/2014    K 4.3 11/24/2020     06/07/2014    CO2 26 06/07/2014    CALCIUM 9.2 06/07/2014    PROTEINTOT 7.4 06/07/2014    ALBUMIN 4.4 06/07/2014    BILITOT 1.0 06/07/2014    ALKPHOS 92 06/07/2014    AST 27 06/07/2014    ALT 28 06/07/2014       Lab Results   Component Value Date    HGB 11.5 (L) 11/19/2020    HGB 11.5 10/21/2020    HGB 13.1 06/07/2014       Lab Results   Component Value Date    MCV 96.1 11/19/2020    MCV 94 10/21/2020    MCV 91.6 06/07/2014     .            Assessment/Plan    1.  Mild anemia.  This likely is an anemia of chronic inflammation.  I am going to recheck her in 6 months to see how it is.  She may have a component of anemia of age.  Though I do not plan on doing any further testing unless there is a drastic change in her numbers.          I spent a total of 15 minutes in direct patient care, greater than 10 minutes (greater than 50%) were spent in coordination of care, and counseling the patient regarding  Anemia . Answered any questions patient had with medication and plan.      Shira Kelly MD  [facility] Hematology and On[facility]         No orders of the defined types were placed in this encounter.      12/17/2020

## 2020-12-18 ENCOUNTER — OFFICE VISIT (OUTPATIENT)
Dept: NEUROSURGERY | Facility: CLINIC | Age: 80
End: 2020-12-18

## 2020-12-18 VITALS
SYSTOLIC BLOOD PRESSURE: 140 MMHG | BODY MASS INDEX: 22.18 KG/M2 | WEIGHT: 110 LBS | DIASTOLIC BLOOD PRESSURE: 70 MMHG | TEMPERATURE: 97.7 F | HEIGHT: 59 IN

## 2020-12-18 DIAGNOSIS — Z98.890 S/P LUMBAR LAMINECTOMY: Primary | ICD-10-CM

## 2020-12-18 DIAGNOSIS — M48.062 LUMBAR STENOSIS WITH NEUROGENIC CLAUDICATION: ICD-10-CM

## 2020-12-18 PROCEDURE — 99024 POSTOP FOLLOW-UP VISIT: CPT | Performed by: PHYSICIAN ASSISTANT

## 2020-12-18 RX ORDER — HYDROCODONE BITARTRATE AND ACETAMINOPHEN 5; 325 MG/1; MG/1
1 TABLET ORAL 2 TIMES DAILY PRN
Qty: 30 TABLET | Refills: 0 | Status: SHIPPED | OUTPATIENT
Start: 2020-12-18 | End: 2021-02-03

## 2020-12-18 NOTE — PROGRESS NOTES
Patient: Danay Gonzales  : 1940  GENDER: female    Primary Care Provider: DEANN Alejandra MD    Requesting Provider: As above      History    Chief Complaint: Low back and bilateral lower extremity pain with walking and standing intolerance    History of Present Illness: Ms. Gonzales is an 80-year-old female who formally worked in [employer].  She presented to our service with progressive back and lower extremity pain with walking and standing intolerances.  Preoperative studies demonstrated generous stenosis at L3-4, and L4-5, with moderate to severe stenosis at L5-S1.  As such patient presented for multilevel lumbar decompression on 2020.    Presently, Ms. Gonzales is 3 weeks postop.  She is overall pleased with her current postoperative progress.  Her low back pain has largely improved.  She continues to harbor some sensory alteration to her bilateral anterior thighs.  She has increased her exercise slightly, and is hoping to get into formal physical therapy.  She completed her Percocet, and is requesting a refill.  She has no other complaints at this time.    Review of Systems   Constitutional: Negative for activity change, appetite change, chills, diaphoresis, fatigue, fever and unexpected weight change.   HENT: Negative for congestion, dental problem, drooling, ear discharge, ear pain, facial swelling, hearing loss, mouth sores, nosebleeds, postnasal drip, rhinorrhea, sinus pressure, sinus pain, sneezing, sore throat, tinnitus, trouble swallowing and voice change.    Eyes: Negative for photophobia, pain, discharge, redness, itching and visual disturbance.   Respiratory: Negative for apnea, cough, choking, chest tightness, shortness of breath, wheezing and stridor.    Cardiovascular: Negative for chest pain, palpitations and leg swelling.   Gastrointestinal: Negative for abdominal distention, abdominal pain, anal bleeding, blood in stool, constipation, diarrhea, nausea, rectal pain and vomiting.    Endocrine: Negative for cold intolerance, heat intolerance, polydipsia, polyphagia and polyuria.   Genitourinary: Negative for decreased urine volume, difficulty urinating, dyspareunia, dysuria, enuresis, flank pain, frequency, genital sores, hematuria, menstrual problem, pelvic pain, urgency, vaginal bleeding, vaginal discharge and vaginal pain.   Musculoskeletal: Positive for arthralgias, gait problem, myalgias and neck pain. Negative for back pain, joint swelling and neck stiffness.   Skin: Negative for color change, pallor, rash and wound.   Allergic/Immunologic: Negative for environmental allergies, food allergies and immunocompromised state.   Neurological: Positive for numbness. Negative for dizziness, tremors, seizures, syncope, facial asymmetry, speech difficulty, weakness, light-headedness and headaches.   Hematological: Negative for adenopathy. Does not bruise/bleed easily.   Psychiatric/Behavioral: Negative for agitation, behavioral problems, confusion, decreased concentration, dysphoric mood, hallucinations, self-injury, sleep disturbance and suicidal ideas. The patient is not nervous/anxious and is not hyperactive.    All other systems reviewed and are negative.      Past Medical History:   Diagnosis Date   • Anemia    • Arthritis    • Cancer (CMS/HCC)     skin   • Chronic constipation    • Diabetes mellitus (CMS/HCC)     borderline - no medications   • Disease of thyroid gland    • GERD (gastroesophageal reflux disease)    • Glaucoma    • Headache    • Hearing aid worn    • History of bronchitis    • History of migraine    • History of pneumonia    • Hypertension    • Kidney disease    • Low back pain    • Lumbar stenosis    • Menopause    • Mumps    • Osteoporosis    • PVD (peripheral vascular disease) (CMS/HCC)    • Stroke (CMS/HCC)     right hand weakness    • Vertigo    • Wears glasses      Past Surgical History:   Procedure Laterality Date   • BREAST BIOPSY Bilateral     BOTH BREASTS US BX, PT  DOES NOT REMEMBER WHEN   • CATARACT EXTRACTION, BILATERAL     • COLONOSCOPY  10/2016   • HYSTERECTOMY  1981    Complete   • LUMBAR LAMINECTOMY DISCECTOMY DECOMPRESSION N/A 11/24/2020    Procedure: LUMBAR LAMINECTOMY L3- S1;  Surgeon: Jae Graves MD;  Location: [facility];  Service: Neurosurgery;  Laterality: N/A;   • OOPHORECTOMY      Comnplete Hysterectomy   • OTHER SURGICAL HISTORY  1999    Scalp   • SKIN CANCER EXCISION Left 2005    Hand   • TONSILLECTOMY  1944   • UPPER GASTROINTESTINAL ENDOSCOPY  10/2016    Esophageal Stretching       Current Outpatient Medications:   •  acetaminophen (TYLENOL) 500 MG tablet, Take 500 mg by mouth Every 6 (Six) Hours As Needed., Disp: , Rfl:   •  aspirin  MG tablet, Take 325 mg by mouth Daily. Patient states Dr. Graves told her not to stop prior to surgery, Disp: , Rfl:   •  calcitriol (ROCALTROL) 0.25 MCG capsule, Take 0.25 mcg by mouth 2 (Two) Times a Week., Disp: , Rfl:   •  calcium carbonate (OS-YASMANY) 600 MG tablet, Take 600 mg by mouth Daily., Disp: , Rfl:   •  Cholecalciferol (VITAMIN D3) 125 MCG (5000 UT) capsule capsule, Take 5,000 Units by mouth Daily., Disp: , Rfl:   •  gabapentin (NEURONTIN) 100 MG capsule, Take 100 mg by mouth At Night As Needed., Disp: , Rfl:   •  levothyroxine (SYNTHROID, LEVOTHROID) 75 MCG tablet, Take 75 mcg by mouth Daily., Disp: , Rfl:   •  Loperamide HCl (IMODIUM A-D PO), Take  by mouth As Needed., Disp: , Rfl:   •  magnesium gluconate (MAGONATE) 500 MG tablet, Take 500 mg by mouth Daily., Disp: , Rfl:   •  Multiple Vitamin (MULTI-VITAMIN DAILY PO), Take  by mouth Daily., Disp: , Rfl:   •  omeprazole (priLOSEC) 40 MG capsule, Take 40 mg by mouth Daily., Disp: , Rfl:   •  ondansetron (ZOFRAN) 4 MG tablet, Take 4 mg by mouth Every 8 (Eight) Hours As Needed for Nausea or Vomiting., Disp: , Rfl:   •  oxyCODONE-acetaminophen (PERCOCET) 5-325 MG per tablet, Take 1 tablet by mouth 3 (Three) Times a Day As Needed (Pain)., Disp: 20 tablet,  "Rfl: 0  •  potassium phosphate, monobasic, (K-PHOS) 500 MG tablet, Take 500 mg by mouth Daily., Disp: , Rfl:   •  rosuvastatin (CRESTOR) 40 MG tablet, Take 40 mg by mouth Daily., Disp: , Rfl:   •  tiZANidine (Zanaflex) 4 MG tablet, Take 1 tablet by mouth At Night As Needed for Muscle Spasms., Disp: 30 tablet, Rfl: 1  •  triamterene-hydrochlorothiazide (DYAZIDE) 37.5-25 MG per capsule, Take 1 capsule by mouth Every Morning., Disp: , Rfl:   •  vitamin B-12 (CYANOCOBALAMIN) 1000 MCG tablet, Take 1,000 mcg by mouth Daily., Disp: , Rfl:   •  HYDROcodone-acetaminophen (Norco) 5-325 MG per tablet, Take 1 tablet by mouth 2 (Two) Times a Day As Needed for Moderate Pain ., Disp: 30 tablet, Rfl: 0    Allergies   Allergen Reactions   • Erythromycin Rash       The patient's review of systems, past medical history, past surgical history, family history, and social history have been reviewed at length in the electronic medical record.    Physical Exam:   /70 (BP Location: Left arm, Patient Position: Sitting, Cuff Size: Adult)   Temp 97.7 °F (36.5 °C) (Infrared)   Ht 148.6 cm (58.5\")   Wt 49.9 kg (110 lb)   BMI 22.60 kg/m²   Consitutional: A&Ox3, pleasant, no acute distress  Skin:   - Well healed surgical incision   - No evidence of wound dehiscence  - NSOI   - Non-TTP    Patient's Body mass index is 22.6 kg/m². BMI is within normal parameters. No follow-up required..         Medical Decision Making    Data Review:   - no new images to review     Diagnosis/Treatment Options:  1. S/P lumbar laminectomy  2. Lumbar stenosis with neurogenic claudication    - Ambulatory Referral to Physical Therapy Evaluate and treat      Follow up:  Ms. Gonzales is seen today in follow-up 3 weeks after undergoing uncomplicated laminectomies from L3-S1 with a right L4-5 discectomy on 11/24/2020.  Patient is overall pleased with her current postoperative progress.  Her low back and lower extremity pain has largely improved.  We went over some " general do's/don'ts moving forward.  Patient verbalized understanding.  I referred her to formal physical therapy, as well as stepped her down to [facility].  She will continue to observe and will be seen back in the office with Dr. Graves for reassessment in 6-8 weeks.      Thea Gutierrez PA-C   12/18/2020   13:35 EST

## 2020-12-22 NOTE — TELEPHONE ENCOUNTER
PT CALLED AND STATES THAT THE MUSCLE RELAXER MAY HAVE HELPED A LITTLE, HOWEVER SHE IS STILL HAVING PAIN HER LEGS DEEP IN THE MUSCLES. HER LEGS ARE JERKING IN THE MIDDLE OF THE NIGHT.  THIS IS KEEPING HER UP AT NIGHT AND SHE CAN NOT SLEEP.  SHE WANTS TO KNOW IF THERE IS ANYTHING ELSE THAT WE CAN TRY.    PT NUMBER [phone]  PLEASE ADVISE  THANK YOU

## 2020-12-23 RX ORDER — GABAPENTIN 100 MG/1
100 CAPSULE ORAL NIGHTLY PRN
Qty: 30 CAPSULE | Refills: 0 | OUTPATIENT
Start: 2020-12-23 | End: 2021-02-03 | Stop reason: SDUPTHER

## 2020-12-23 NOTE — TELEPHONE ENCOUNTER
Patient only experiences symptoms at night. It has been waking her up.  I am going to restart a low dose of neurontin at bedtime and see if that helps. She will keep us updated.  She is also being mindful of staying hydrated and taking a multivitamin

## 2020-12-31 ENCOUNTER — TREATMENT (OUTPATIENT)
Dept: PHYSICAL THERAPY | Facility: CLINIC | Age: 80
End: 2020-12-31

## 2020-12-31 DIAGNOSIS — G89.29 CHRONIC LOW BACK PAIN, UNSPECIFIED BACK PAIN LATERALITY, UNSPECIFIED WHETHER SCIATICA PRESENT: Primary | ICD-10-CM

## 2020-12-31 DIAGNOSIS — M54.50 CHRONIC LOW BACK PAIN, UNSPECIFIED BACK PAIN LATERALITY, UNSPECIFIED WHETHER SCIATICA PRESENT: Primary | ICD-10-CM

## 2020-12-31 PROCEDURE — 97110 THERAPEUTIC EXERCISES: CPT | Performed by: PHYSICAL THERAPIST

## 2020-12-31 PROCEDURE — 97161 PT EVAL LOW COMPLEX 20 MIN: CPT | Performed by: PHYSICAL THERAPIST

## 2021-01-11 ENCOUNTER — TREATMENT (OUTPATIENT)
Dept: PHYSICAL THERAPY | Facility: CLINIC | Age: 81
End: 2021-01-11

## 2021-01-11 DIAGNOSIS — G89.29 CHRONIC MIDLINE LOW BACK PAIN WITHOUT SCIATICA: Primary | ICD-10-CM

## 2021-01-11 DIAGNOSIS — M54.50 CHRONIC MIDLINE LOW BACK PAIN WITHOUT SCIATICA: Primary | ICD-10-CM

## 2021-01-11 DIAGNOSIS — Z98.890 S/P LUMBAR LAMINECTOMY: Primary | ICD-10-CM

## 2021-01-11 PROCEDURE — 97110 THERAPEUTIC EXERCISES: CPT | Performed by: PHYSICAL THERAPIST

## 2021-01-11 PROCEDURE — 97530 THERAPEUTIC ACTIVITIES: CPT | Performed by: PHYSICAL THERAPIST

## 2021-01-11 NOTE — PROGRESS NOTES
Physical Therapy Daily Progress Note    Time In   Time Out     Patient: Danay Gonzales   : 1940  Diagnosis/ICD-10 Code:  No primary diagnosis found.  Referring practitioner: Thea Gutierrez PA-C  Date of Initial Visit: No linked episodes  Today's Date: 2021  Patient seen for Visit count could not be calculated. Make sure you are using a visit which is associated with an episode. sessions         Danay Gonzales reports feeling good after her last visit.  Using Tylenol at this point to control any soreness.        Subjective     Objective   See Exercise, Manual, and Modality Logs for complete treatment.   *Pre bike HR/02:  80/96  *Post bike HR/02:  99/100    Assessment/Plan    Ms. Gonzales is 6w 6d s/p L3-S1 L/S laminectomy on 20.  Covered medical history.  Surgical incision is approximated and healing well.  Focused care on improving trunk and hip mm endurance.  Vitals were well controlled during CV exercise on bike today.  Pt was feeling fatigued in R LE post bike, and stopped visit.  Walked patient out to car with HHA x 1 for son to drive her home.           Manual Therapy:         mins  08261;  Therapeutic Exercise:    30     mins  24760;     Neuromuscular Pankaj:        mins  89986;    Therapeutic Activity:     15     mins  73738;     Gait Training:           mins  98186;     Ultrasound:          mins  09161;    Electrical Stimulation:         mins  26825 ( );  Dry Needling          mins self-pay    Timed Treatment:   45   mins   Total Treatment:     45   mins    Jhony Haywood, PT  Physical Therapist

## 2021-01-15 ENCOUNTER — TREATMENT (OUTPATIENT)
Dept: PHYSICAL THERAPY | Facility: CLINIC | Age: 81
End: 2021-01-15

## 2021-01-15 DIAGNOSIS — M54.41 CHRONIC MIDLINE LOW BACK PAIN WITH RIGHT-SIDED SCIATICA: Primary | ICD-10-CM

## 2021-01-15 DIAGNOSIS — G89.29 CHRONIC MIDLINE LOW BACK PAIN WITH RIGHT-SIDED SCIATICA: Primary | ICD-10-CM

## 2021-01-15 PROCEDURE — 97110 THERAPEUTIC EXERCISES: CPT | Performed by: PHYSICAL THERAPIST

## 2021-01-15 PROCEDURE — 97530 THERAPEUTIC ACTIVITIES: CPT | Performed by: PHYSICAL THERAPIST

## 2021-01-15 PROCEDURE — 97140 MANUAL THERAPY 1/> REGIONS: CPT | Performed by: PHYSICAL THERAPIST

## 2021-01-17 NOTE — PROGRESS NOTES
Physical Therapy Daily Progress Note    Patient: Danay Gonzales   : 1940  Diagnosis/ICD-10 Code:  No primary diagnosis found.  Referring practitioner: Thea Gutierrez PA-C  Date of Initial Visit: Type: THERAPY  Noted: 2020  Today's Date: 2021  Patient seen for 3 sessions         Danay Gonzales reports feeling some muscle soreness after her last visit.  Right knee was aggravated after last visit and feels better today.  Had tingling in her right lower leg that kept her from sleeping well last night.  No longer has those symptoms today.      Subjective     Objective   See Exercise, Manual, and Modality Logs for complete treatment.       Assessment/Plan  Ms. Gonzales is 7w 3d s/p L/S laminectomy.  Did not change exercises from last visit to allow for tissue adaptation based on response from last visit. Performed manual techniques to reduce feeling of tension along posterior thigh and lower leg.             Manual Therapy:    12     mins  45214;  Therapeutic Exercise:    15     mins  26339;     Neuromuscular Pankaj:        mins  06898;    Therapeutic Activity:     15     mins  99556;     Gait Training:           mins  71622;     Ultrasound:          mins  87977;    Electrical Stimulation:         mins  00930 ( );  Dry Needling          mins self-pay    Timed Treatment:   42   mins   Total Treatment:     42   mins    Jhony Haywood PT  Physical Therapist

## 2021-01-22 ENCOUNTER — TREATMENT (OUTPATIENT)
Dept: PHYSICAL THERAPY | Facility: CLINIC | Age: 81
End: 2021-01-22

## 2021-01-22 DIAGNOSIS — G89.29 CHRONIC MIDLINE LOW BACK PAIN WITH RIGHT-SIDED SCIATICA: Primary | ICD-10-CM

## 2021-01-22 DIAGNOSIS — M54.41 CHRONIC MIDLINE LOW BACK PAIN WITH RIGHT-SIDED SCIATICA: Primary | ICD-10-CM

## 2021-01-22 PROCEDURE — 97110 THERAPEUTIC EXERCISES: CPT | Performed by: PHYSICAL THERAPIST

## 2021-01-22 PROCEDURE — 97112 NEUROMUSCULAR REEDUCATION: CPT | Performed by: PHYSICAL THERAPIST

## 2021-01-26 NOTE — PROGRESS NOTES
Physical Therapy Daily Progress Note    Subjective   Danay Gonzales reports that she is feeling better since beginning therapy and she has been working on doing the exercises at home.       Objective   See Exercise, Manual, and Modality Logs for complete treatment.       Assessment/Plan     Pt is progressing well so far and she demonstrates a good tolerance to core stabilization activity. Will cont to progress as indicated.     Progress per Plan of Care and Progress strengthening /stabilization /functional activity           Manual Therapy:         mins  53388;  Therapeutic Exercise:    18     mins  44294;     Neuromuscular Pankaj:    26    mins  64666;    Therapeutic Activity:          mins  56592;     Gait Training:           mins  63302;     Ultrasound:          mins  88472;    Electrical Stimulation:         mins  30945 ( );  E-Stim Attended:         mins  12333  Iontophoresis          mins 73082   Traction          mins  20915  Fluidotherapy          mins  42101  Dry Needling          mins self-pay - No Charge  Paraffin          mins  24446    Timed Treatment:   44   mins   Total Treatment:     44   mins    Justus Schaeffer, PT, DPT, OCS, Cert. DN  Physical Therapist

## 2021-01-27 ENCOUNTER — TREATMENT (OUTPATIENT)
Dept: PHYSICAL THERAPY | Facility: CLINIC | Age: 81
End: 2021-01-27

## 2021-01-27 DIAGNOSIS — G89.29 CHRONIC LOW BACK PAIN, UNSPECIFIED BACK PAIN LATERALITY, UNSPECIFIED WHETHER SCIATICA PRESENT: Primary | ICD-10-CM

## 2021-01-27 DIAGNOSIS — M54.50 CHRONIC LOW BACK PAIN, UNSPECIFIED BACK PAIN LATERALITY, UNSPECIFIED WHETHER SCIATICA PRESENT: Primary | ICD-10-CM

## 2021-01-27 PROCEDURE — 97140 MANUAL THERAPY 1/> REGIONS: CPT | Performed by: PHYSICAL THERAPIST

## 2021-01-27 PROCEDURE — 97112 NEUROMUSCULAR REEDUCATION: CPT | Performed by: PHYSICAL THERAPIST

## 2021-01-27 PROCEDURE — 97110 THERAPEUTIC EXERCISES: CPT | Performed by: PHYSICAL THERAPIST

## 2021-01-29 ENCOUNTER — TREATMENT (OUTPATIENT)
Dept: PHYSICAL THERAPY | Facility: CLINIC | Age: 81
End: 2021-01-29

## 2021-01-29 DIAGNOSIS — G89.29 CHRONIC MIDLINE LOW BACK PAIN WITH RIGHT-SIDED SCIATICA: Primary | ICD-10-CM

## 2021-01-29 DIAGNOSIS — M54.41 CHRONIC MIDLINE LOW BACK PAIN WITH RIGHT-SIDED SCIATICA: Primary | ICD-10-CM

## 2021-01-29 PROCEDURE — 97140 MANUAL THERAPY 1/> REGIONS: CPT | Performed by: PHYSICAL THERAPIST

## 2021-01-29 PROCEDURE — 97112 NEUROMUSCULAR REEDUCATION: CPT | Performed by: PHYSICAL THERAPIST

## 2021-01-29 PROCEDURE — 97110 THERAPEUTIC EXERCISES: CPT | Performed by: PHYSICAL THERAPIST

## 2021-01-29 PROCEDURE — 97530 THERAPEUTIC ACTIVITIES: CPT | Performed by: PHYSICAL THERAPIST

## 2021-01-29 NOTE — PROGRESS NOTES
Physical Therapy Daily Progress Note    Subjective   Danay Gonzales reports that she is feeling very good and she has not really noticed much discomfort in the back recently. She also feels some improvement in her right knee pain since the last treatment as well.       Objective   See Exercise, Manual, and Modality Logs for complete treatment.       Assessment/Plan     Continued to progress exercise for low back and hip stability and for improving knee strength. Will con to progress as indicated.     Progress per Plan of Care and Progress strengthening /stabilization /functional activity           Manual Therapy:    13     mins  26333;  Therapeutic Exercise:    30     mins  61180;     Neuromuscular Pankaj:    15    mins  03086;    Therapeutic Activity:          mins  11846;     Gait Training:           mins  64362;     Ultrasound:          mins  99310;    Electrical Stimulation:         mins  15673 ( );  E-Stim Attended:         mins  85146  Iontophoresis          mins 82480   Traction          mins  60576  Fluidotherapy          mins  46915  Dry Needling          mins self-pay - No Charge  Paraffin          mins  64053    Timed Treatment:   58   mins   Total Treatment:     58   mins    Justus Schaeffer, PT, DPT, OCS, Cert. DN  Physical Therapist

## 2021-02-02 ENCOUNTER — TREATMENT (OUTPATIENT)
Dept: PHYSICAL THERAPY | Facility: CLINIC | Age: 81
End: 2021-02-02

## 2021-02-02 DIAGNOSIS — G89.29 CHRONIC MIDLINE LOW BACK PAIN WITH RIGHT-SIDED SCIATICA: Primary | ICD-10-CM

## 2021-02-02 DIAGNOSIS — M54.41 CHRONIC MIDLINE LOW BACK PAIN WITH RIGHT-SIDED SCIATICA: Primary | ICD-10-CM

## 2021-02-02 DIAGNOSIS — G89.29 CHRONIC LOW BACK PAIN, UNSPECIFIED BACK PAIN LATERALITY, UNSPECIFIED WHETHER SCIATICA PRESENT: ICD-10-CM

## 2021-02-02 DIAGNOSIS — M54.50 CHRONIC LOW BACK PAIN, UNSPECIFIED BACK PAIN LATERALITY, UNSPECIFIED WHETHER SCIATICA PRESENT: ICD-10-CM

## 2021-02-02 PROCEDURE — 97110 THERAPEUTIC EXERCISES: CPT | Performed by: PHYSICAL THERAPIST

## 2021-02-02 PROCEDURE — 97140 MANUAL THERAPY 1/> REGIONS: CPT | Performed by: PHYSICAL THERAPIST

## 2021-02-02 PROCEDURE — 97112 NEUROMUSCULAR REEDUCATION: CPT | Performed by: PHYSICAL THERAPIST

## 2021-02-03 ENCOUNTER — OFFICE VISIT (OUTPATIENT)
Dept: NEUROSURGERY | Facility: CLINIC | Age: 81
End: 2021-02-03

## 2021-02-03 VITALS — RESPIRATION RATE: 15 BRPM | WEIGHT: 113.2 LBS | HEIGHT: 59 IN | BODY MASS INDEX: 22.82 KG/M2

## 2021-02-03 DIAGNOSIS — M25.561 RECURRENT PAIN OF RIGHT KNEE: ICD-10-CM

## 2021-02-03 DIAGNOSIS — Z98.890 S/P LUMBAR LAMINECTOMY: Primary | ICD-10-CM

## 2021-02-03 DIAGNOSIS — M48.062 LUMBAR STENOSIS WITH NEUROGENIC CLAUDICATION: ICD-10-CM

## 2021-02-03 PROCEDURE — 99024 POSTOP FOLLOW-UP VISIT: CPT | Performed by: NEUROLOGICAL SURGERY

## 2021-02-03 RX ORDER — GABAPENTIN 100 MG/1
100 CAPSULE ORAL NIGHTLY
Qty: 30 CAPSULE | Refills: 0 | Status: SHIPPED | OUTPATIENT
Start: 2021-02-03

## 2021-02-03 RX ORDER — TIZANIDINE 4 MG/1
4 TABLET ORAL NIGHTLY PRN
Qty: 30 TABLET | Refills: 1 | Status: SHIPPED | OUTPATIENT
Start: 2021-02-03 | End: 2021-04-14

## 2021-02-03 NOTE — PROGRESS NOTES
Patient: Danay Gonzales  : 1940    Primary Care Provider: DEANN Alejandra MD    Requesting Provider: As above        History    Chief Complaint: Low back and bilateral lower extremity pain with walking and standing intolerance.    History of Present Illness: Ms. Gonzales is an 80-year-old female who formally worked in banking.  She presented to our service with progressive back and lower extremity pain with walking and standing intolerances.  Preoperative studies demonstrated generous stenosis at L3-4, and L4-5, with moderate to severe stenosis at L5-S1.  As such patient presented for multilevel lumbar decompression on 2020.  Decompressive laminectomies at L2-3, L3-4, and L4-5 as well as right L4-5 discectomy was performed.  She is doing great.  She has very minimal pain.  She has been very active.  Her main complaint at this point is right knee pain this been present for decades.    Review of Systems   Constitutional: Negative for activity change, appetite change, chills, diaphoresis, fatigue, fever and unexpected weight change.   HENT: Negative for congestion, dental problem, drooling, ear discharge, ear pain, facial swelling, hearing loss, mouth sores, nosebleeds, postnasal drip, rhinorrhea, sinus pressure, sneezing, sore throat, tinnitus, trouble swallowing and voice change.    Eyes: Negative for photophobia, pain, discharge, redness, itching and visual disturbance.   Respiratory: Negative for apnea, cough, choking, chest tightness, shortness of breath, wheezing and stridor.    Cardiovascular: Negative for chest pain, palpitations and leg swelling.   Gastrointestinal: Negative for abdominal distention, abdominal pain, anal bleeding, blood in stool, constipation, diarrhea, nausea, rectal pain and vomiting.   Endocrine: Negative for cold intolerance, heat intolerance, polydipsia, polyphagia and polyuria.   Genitourinary: Negative for decreased urine volume, difficulty urinating, dysuria, enuresis,  "flank pain, frequency, genital sores, hematuria and urgency.   Musculoskeletal: Negative for arthralgias, back pain, gait problem, joint swelling, myalgias, neck pain and neck stiffness.   Skin: Negative for color change, pallor, rash and wound.   Allergic/Immunologic: Negative for environmental allergies, food allergies and immunocompromised state.   Neurological: Negative for dizziness, tremors, seizures, syncope, facial asymmetry, speech difficulty, weakness, light-headedness, numbness and headaches.   Hematological: Negative for adenopathy. Does not bruise/bleed easily.   Psychiatric/Behavioral: Negative for agitation, behavioral problems, confusion, decreased concentration, dysphoric mood, hallucinations, self-injury, sleep disturbance and suicidal ideas. The patient is not nervous/anxious and is not hyperactive.    All other systems reviewed and are negative.      The patient's past medical history, past surgical history, family history, and social history have been reviewed at length in the electronic medical record.    Physical Exam:   Resp 15   Ht 148.6 cm (58.5\")   Wt 51.3 kg (113 lb 3.2 oz)   BMI 23.26 kg/m²   The patient moves about in a spry fashion.  Her lumbar incision looks great.  With mobilization of her right knee there is a good deal of popping and cracking.    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  Some of her knee symptoms are coming from her knee.  It is possible that some of her residual symptoms are related to her back difficulties as well.    Treatment Options:   Ms. Gonzales is doing well.  I am going to refer her to orthopedics for evaluation of her right knee.  She will follow-up in our clinic in approximately 2.5 months.  I have renewed her Zanaflex and low-dose gabapentin.       Diagnosis Plan   1. S/P lumbar laminectomy  gabapentin (NEURONTIN) 100 MG capsule    tiZANidine (Zanaflex) 4 MG " tablet   2. Lumbar stenosis with neurogenic claudication     3. Recurrent pain of right knee  Ambulatory Referral to Orthopedic Surgery       Scribed for Jae Graves MD by Mariana Portillo CMA on 2/3/2021 15:08 EST       I, Dr. Graves, personally performed the services described in the documentation, as scribed in my presence, and it is both accurate and complete.

## 2021-02-03 NOTE — PROGRESS NOTES
Physical Therapy Daily Progress Note    Subjective   Danay Gonzales reports that she is feeling better in the low back and she doesn't actually have much pain in the low back, only weakness. She does have more pain in the knee.       Objective   See Exercise, Manual, and Modality Logs for complete treatment.       Assessment/Plan     Pt is making steady progress and her tolerance to activity is increasing with each session. Will continue to work on low back stability and core strength as well as addressing limitations with the knee that contribute to movement asymmetry.    Progress per Plan of Care and Progress strengthening /stabilization /functional activity           Manual Therapy:    13     mins  45807;  Therapeutic Exercise:    10     mins  94285;     Neuromuscular Pankaj:    30    mins  44025;    Therapeutic Activity:          mins  42168;     Gait Training:           mins  72531;     Ultrasound:          mins  54156;    Electrical Stimulation:         mins  37197 ( );  E-Stim Attended:         mins  63512  Iontophoresis          mins 42054   Traction          mins  00321  Fluidotherapy          mins  79437  Dry Needling          mins self-pay - No Charge  Paraffin          mins  68933    Timed Treatment:   53   mins   Total Treatment:     53   mins    Justus Schaeffer, PT, DPT, OCS, Cert. DN  Physical Therapist

## 2021-02-05 ENCOUNTER — TREATMENT (OUTPATIENT)
Dept: PHYSICAL THERAPY | Facility: CLINIC | Age: 81
End: 2021-02-05

## 2021-02-05 DIAGNOSIS — G89.29 CHRONIC LOW BACK PAIN, UNSPECIFIED BACK PAIN LATERALITY, UNSPECIFIED WHETHER SCIATICA PRESENT: ICD-10-CM

## 2021-02-05 DIAGNOSIS — M54.50 CHRONIC LOW BACK PAIN, UNSPECIFIED BACK PAIN LATERALITY, UNSPECIFIED WHETHER SCIATICA PRESENT: ICD-10-CM

## 2021-02-05 DIAGNOSIS — G89.29 CHRONIC MIDLINE LOW BACK PAIN WITH RIGHT-SIDED SCIATICA: Primary | ICD-10-CM

## 2021-02-05 DIAGNOSIS — M54.41 CHRONIC MIDLINE LOW BACK PAIN WITH RIGHT-SIDED SCIATICA: Primary | ICD-10-CM

## 2021-02-05 PROCEDURE — 97140 MANUAL THERAPY 1/> REGIONS: CPT | Performed by: PHYSICAL THERAPIST

## 2021-02-05 PROCEDURE — 97530 THERAPEUTIC ACTIVITIES: CPT | Performed by: PHYSICAL THERAPIST

## 2021-02-05 PROCEDURE — 97112 NEUROMUSCULAR REEDUCATION: CPT | Performed by: PHYSICAL THERAPIST

## 2021-02-06 NOTE — PROGRESS NOTES
Physical Therapy Daily Progress Note    Subjective   Danay Gonzales reports that she feels like she is doing very well. She has not really noticed any pain in her low back recently and she feels like her right knee is getting better with therapy as w2ell       Objective   See Exercise, Manual, and Modality Logs for complete treatment.       Assessment/Plan     Pt is making steady progress and she demonstrates improved tolerance to activity and improved overall strength/stability. WIll cont to progress as indicated.     Progress per Plan of Care and Progress strengthening /stabilization /functional activity           Manual Therapy:    12     mins  60637;  Therapeutic Exercise:         mins  86983;     Neuromuscular Pankaj:    32    mins  37213;    Therapeutic Activity:     15     mins  37976;     Gait Training:           mins  70862;     Ultrasound:          mins  67144;    Electrical Stimulation:         mins  66442 ( );  E-Stim Attended:         mins  98061  Iontophoresis          mins 13918   Traction          mins  50390  Fluidotherapy          mins  31701  Dry Needling          mins self-pay - No Charge  Paraffin          mins  65084    Timed Treatment:   59   mins   Total Treatment:     59   mins    Justus Schaeffer, PT, DPT, OCS, Cert. DN  Physical Therapist

## 2021-02-09 ENCOUNTER — TREATMENT (OUTPATIENT)
Dept: PHYSICAL THERAPY | Facility: CLINIC | Age: 81
End: 2021-02-09

## 2021-02-09 DIAGNOSIS — M54.50 CHRONIC LOW BACK PAIN, UNSPECIFIED BACK PAIN LATERALITY, UNSPECIFIED WHETHER SCIATICA PRESENT: ICD-10-CM

## 2021-02-09 DIAGNOSIS — M54.41 CHRONIC MIDLINE LOW BACK PAIN WITH RIGHT-SIDED SCIATICA: Primary | ICD-10-CM

## 2021-02-09 DIAGNOSIS — G89.29 CHRONIC MIDLINE LOW BACK PAIN WITH RIGHT-SIDED SCIATICA: Primary | ICD-10-CM

## 2021-02-09 DIAGNOSIS — G89.29 CHRONIC LOW BACK PAIN, UNSPECIFIED BACK PAIN LATERALITY, UNSPECIFIED WHETHER SCIATICA PRESENT: ICD-10-CM

## 2021-02-09 PROCEDURE — 97140 MANUAL THERAPY 1/> REGIONS: CPT | Performed by: PHYSICAL THERAPIST

## 2021-02-09 PROCEDURE — 97112 NEUROMUSCULAR REEDUCATION: CPT | Performed by: PHYSICAL THERAPIST

## 2021-02-09 PROCEDURE — 97110 THERAPEUTIC EXERCISES: CPT | Performed by: PHYSICAL THERAPIST

## 2021-02-09 NOTE — PROGRESS NOTES
Physical Therapy Daily Progress Note    Subjective   Danay Gonzales reports that she is feeling much better with the knee and she has been able to walk more without pain in the knee. She feels some weakness in the low back, but denies pain.       Objective   See Exercise, Manual, and Modality Logs for complete treatment.       Assessment/Plan     Pt is making steady progress and she demonstrates an improvement in her pain level and tolerance to activity. Will cont to progress as indicated with a focus on lumbopelvic stabilization and general strengthening.     Progress per Plan of Care and Progress strengthening /stabilization /functional activity           Manual Therapy:    14     mins  90360;  Therapeutic Exercise:    15     mins  45145;     Neuromuscular Pankaj:    28    mins  76639;    Therapeutic Activity:          mins  65827;     Gait Training:           mins  38936;     Ultrasound:          mins  92094;    Electrical Stimulation:         mins  33445 ( );  E-Stim Attended:         mins  66526  Iontophoresis          mins 08437   Traction          mins  95730  Fluidotherapy          mins  51935  Dry Needling          mins self-pay - No Charge  Paraffin          mins  97111    Timed Treatment:   57   mins   Total Treatment:     57   mins    Justus Schaeffer, PT, DPT, OCS, Cert. DN  Physical Therapist

## 2021-02-15 NOTE — ANESTHESIA POSTPROCEDURE EVALUATION
Patient: Danay Gonzales    Procedure Summary     Date: 11/24/20 Room / Location:  [facility] 12 /  [facility]    Anesthesia Start: 1345 Anesthesia Stop:     Procedure: LUMBAR LAMINECTOMY L3- S1 (N/A Spine Lumbar) Diagnosis:       Lumbar stenosis with neurogenic claudication      (Lumbar stenosis with neurogenic claudication [M48.062])    Surgeon: Jae Graves MD Provider: Flavio Whelan MD    Anesthesia Type: general ASA Status: 3          Anesthesia Type: general    Vitals  Vitals Value Taken Time   /105 11/24/20 1528   Temp 96.8 °F (36 °C) 11/24/20 1528   Pulse 115 11/24/20 1531   Resp 16 11/24/20 1528   SpO2 100 % 11/24/20 1531   Vitals shown include unvalidated device data.        Post Anesthesia Care and Evaluation    Patient location during evaluation: PACU  Patient participation: complete - patient participated  Level of consciousness: awake and responsive to verbal stimuli  Pain score: 2  Pain management: adequate  Airway patency: patent  Anesthetic complications: No anesthetic complications    Cardiovascular status: acceptable  Respiratory status: acceptable  Hydration status: acceptable    Comments: Pt awake and responsive. SV. VSS. Report to RN. Patient Vitals in the past 24 hrs:  11/24/20 1528, BP:(!) 180/105, Temp:96.8 °F (36 °C), Temp src:Temporal, Pulse:117, Resp:16, SpO2:100 %  11/24/20 1231, BP:157/66, Temp:98.6 °F (37 °C), Temp src:Temporal, Pulse:73, Resp:18, SpO2:99 %  133/78. p 72. r 16. t 98.1                   None

## 2021-02-19 ENCOUNTER — TREATMENT (OUTPATIENT)
Dept: PHYSICAL THERAPY | Facility: CLINIC | Age: 81
End: 2021-02-19

## 2021-02-19 DIAGNOSIS — Z98.890 S/P LUMBAR LAMINECTOMY: Primary | ICD-10-CM

## 2021-02-19 DIAGNOSIS — G89.29 CHRONIC LOW BACK PAIN, UNSPECIFIED BACK PAIN LATERALITY, UNSPECIFIED WHETHER SCIATICA PRESENT: Primary | ICD-10-CM

## 2021-02-19 DIAGNOSIS — M54.50 CHRONIC LOW BACK PAIN, UNSPECIFIED BACK PAIN LATERALITY, UNSPECIFIED WHETHER SCIATICA PRESENT: Primary | ICD-10-CM

## 2021-02-19 PROCEDURE — 97112 NEUROMUSCULAR REEDUCATION: CPT | Performed by: PHYSICAL THERAPIST

## 2021-02-19 PROCEDURE — 97140 MANUAL THERAPY 1/> REGIONS: CPT | Performed by: PHYSICAL THERAPIST

## 2021-02-19 PROCEDURE — 97110 THERAPEUTIC EXERCISES: CPT | Performed by: PHYSICAL THERAPIST

## 2021-02-19 NOTE — PROGRESS NOTES
Re-Assessment / Re-Certification      Patient: Danay Gonzales   : 1940  Diagnosis/ICD-10 Code:  Chronic low back pain, unspecified back pain laterality, unspecified whether sciatica present [M54.5, G89.29]  Referring practitioner: Thea Gutierrez PA-C  Date of Initial Visit: 2021  Today's Date: 2021  Patient seen for 10 sessions      Subjective:   Danay Gonzales reports that she is feeling much better in the low back and she denies having any pain in the low back at this time. She still feels like her low back is weak and she feels some pressure in the low back at times, but she does not describe it as pain. She feels more limitation due to pain in the knee, but she wants to continue to get stronger in the back and core.    Clinical Progress: improved  Home Program Compliance: Yes  Treatment has included: therapeutic exercise, neuromuscular re-education, manual therapy and therapeutic activity    Subjective Evaluation    Pain  Current pain ratin  At best pain ratin         Objective          Palpation     Additional Palpation Details  Very mild hypertonicity noted in the lower lumbar paraspinals. Mld midline tenderness noted in lower lumbar segments as well     Active Range of Motion     Lumbar   Flexion: Active lumbar flexion: 75%   Extension: Active lumbar extension: 60%     Strength/Myotome Testing     Left Hip   Planes of Motion   Extension: 4  Abduction: 4    Right Hip   Planes of Motion   Extension: 4  Abduction: 4      Assessment/Plan     Pt is progressing well with therapy and she demonstrates an improvement in her tolerance to exercise in the clinic and in her tolerance to prolonged walking/general activity. She continues to demonstrate some weakness of core musculature and prox his stabilizers and she will benefit from continued skilled PT services to address these issues.     Progress toward previous goals: Partially Met    New Goals  No new goals at this time        Recommendations: Continue as planned  Timeframe: 1 month  Prognosis to achieve goals: good    PT Signature: Justus Schaeffer PT      Based upon review of the patient's progress and continued therapy plan, it is my medical opinion that Danay Gonzales should continue physical therapy treatment at [facility]  [address] [place] [id number].    Signature: __________________________________  Thea Gutierrez PA-C    Manual Therapy:    12     mins  51990;  Therapeutic Exercise:    15     mins  55720;     Neuromuscular Pankaj:    30    mins  86017;    Therapeutic Activity:          mins  61462;     Gait Training:           mins  81964;     Ultrasound:          mins  86590;    Electrical Stimulation:         mins  04625 ( );  E-Stim Attended:         mins  04658  Iontophoresis          mins 22807   Traction          mins  30563  Fluidotherapy          mins  86242  Dry Needling          mins self-pay  Paraffin          mins  40241    Timed Treatment:   57   mins   Total Treatment:     57   mins

## 2021-03-01 ENCOUNTER — OFFICE VISIT (OUTPATIENT)
Dept: ORTHOPEDIC SURGERY | Facility: CLINIC | Age: 81
End: 2021-03-01

## 2021-03-01 VITALS — HEART RATE: 80 BPM | HEIGHT: 59 IN | WEIGHT: 113.1 LBS | BODY MASS INDEX: 22.8 KG/M2 | OXYGEN SATURATION: 97 %

## 2021-03-01 DIAGNOSIS — M17.11 PRIMARY OSTEOARTHRITIS OF RIGHT KNEE: Primary | ICD-10-CM

## 2021-03-01 PROCEDURE — 20610 DRAIN/INJ JOINT/BURSA W/O US: CPT | Performed by: ORTHOPAEDIC SURGERY

## 2021-03-01 PROCEDURE — 99204 OFFICE O/P NEW MOD 45 MIN: CPT | Performed by: ORTHOPAEDIC SURGERY

## 2021-03-01 RX ADMIN — ROPIVACAINE HYDROCHLORIDE 4 ML: 5 INJECTION, SOLUTION EPIDURAL; INFILTRATION; PERINEURAL at 16:13

## 2021-03-01 RX ADMIN — TRIAMCINOLONE ACETONIDE 40 MG: 40 INJECTION, SUSPENSION INTRA-ARTICULAR; INTRAMUSCULAR at 16:13

## 2021-03-01 NOTE — PROGRESS NOTES
Procedure   Large Joint Arthrocentesis: R knee  Date/Time: 3/1/2021 4:13 PM  Consent given by: patient  Site marked: site marked  Timeout: Immediately prior to procedure a time out was called to verify the correct patient, procedure, equipment, support staff and site/side marked as required   Supporting Documentation  Indications: pain   Procedure Details  Location: knee - R knee  Preparation: Patient was prepped and draped in the usual sterile fashion  Needle size: 22 G  Approach: anterolateral  Medications administered: 40 mg triamcinolone acetonide 40 MG/ML; 4 mL ropivacaine 0.5 %  Patient tolerance: patient tolerated the procedure well with no immediate complications

## 2021-03-01 NOTE — PROGRESS NOTES
[facility] [place] [facility] Clinic Note    Subjective     Chief Complaint   Patient presents with   • Right Knee - Pain        HPI    Danay Gonzales is a 80 y.o. female who presents with new problem of right knee pain.     She reports right knee pain that has been steadily worsening since in injury 25 years ago. The pain worsened 2 years ago after a mechanical fall in which she landed on the right knee. She has been using a knee sleeve that helps her pain. She is also recovering from back surgery done on 11/24/2021 by Dr. Saturnino Graves. She has not had knee injections in the past and wants to avoid surgery if possible. She has a history of right foot neuropathy.     I have reviewed the following portions of the patient's history and agree with: History of Present Illness and Review of Systems    Patient Active Problem List   Diagnosis   • PVD (peripheral vascular disease) (CMS/HCC)   • Lumbar stenosis with neurogenic claudication   • Spondylosis of lumbar region without myelopathy or radiculopathy   • Sacroiliac joint dysfunction of left side   • Gait disturbance   • At high risk for falls   • Degeneration of lumbar or lumbosacral intervertebral disc   • Anemia with chronic illness     Past Medical History:   Diagnosis Date   • Anemia    • Arthritis    • Cancer (CMS/HCC)     skin   • Chronic constipation    • Diabetes mellitus (CMS/HCC)     borderline - no medications   • Disease of thyroid gland    • GERD (gastroesophageal reflux disease)    • Glaucoma    • Headache    • Hearing aid worn    • History of bronchitis    • History of migraine    • History of pneumonia    • Hypertension    • Kidney disease    • Low back pain    • Lumbar stenosis    • Menopause    • Mumps    • Osteoporosis    • PVD (peripheral vascular disease) (CMS/HCC)    • Stroke (CMS/HCC)     right hand weakness    • Vertigo    • Wears glasses       Past Surgical History:   Procedure Laterality Date   • BREAST BIOPSY Bilateral     BOTH BREASTS US  BX, PT DOES NOT REMEMBER WHEN   • CATARACT EXTRACTION, BILATERAL     • COLONOSCOPY  10/2016   • HYSTERECTOMY  1981    Complete   • LUMBAR LAMINECTOMY DISCECTOMY DECOMPRESSION N/A 11/24/2020    Procedure: LUMBAR LAMINECTOMY L3- S1;  Surgeon: Jae Graves MD;  Location: [facility];  Service: Neurosurgery;  Laterality: N/A;   • OOPHORECTOMY      Comnplete Hysterectomy   • OTHER SURGICAL HISTORY  1999    Scalp   • SKIN CANCER EXCISION Left 2005    Hand   • TONSILLECTOMY  1944   • UPPER GASTROINTESTINAL ENDOSCOPY  10/2016    Esophageal Stretching      Family History   Problem Relation Age of Onset   • Heart disease Mother    • Heart attack Mother    • Stroke Mother    • Lung disease Father    • Heart disease Sister    • Cancer Sister    • Heart disease Brother    • Stroke Brother    • Cancer Sister    • Cancer Brother    • Stroke Brother    • Heart disease Brother    • Heart disease Brother    • Breast cancer Neg Hx    • Ovarian cancer Neg Hx      Social History     Socioeconomic History   • Marital status:      Spouse name: Not on file   • Number of children: Not on file   • Years of education: Not on file   • Highest education level: Not on file   Tobacco Use   • Smoking status: Never Smoker   • Smokeless tobacco: Never Used   Substance and Sexual Activity   • Alcohol use: No     Frequency: Never   • Drug use: No   • Sexual activity: Defer      Current Outpatient Medications on File Prior to Visit   Medication Sig Dispense Refill   • acetaminophen (TYLENOL) 500 MG tablet Take 500 mg by mouth Every 6 (Six) Hours As Needed.     • aspirin  MG tablet Take 325 mg by mouth Daily. Patient states Dr. Graves told her not to stop prior to surgery     • calcitriol (ROCALTROL) 0.25 MCG capsule Take 0.25 mcg by mouth 2 (Two) Times a Week.     • calcium carbonate (OS-YASMANY) 600 MG tablet Take 600 mg by mouth Daily.     • Cholecalciferol (VITAMIN D3) 125 MCG (5000 UT) capsule capsule Take 5,000 Units by mouth Daily.       • gabapentin (NEURONTIN) 100 MG capsule Take 1 capsule by mouth Every Night. 30 capsule 0   • levothyroxine (SYNTHROID, LEVOTHROID) 75 MCG tablet Take 75 mcg by mouth Daily.     • Loperamide HCl (IMODIUM A-D PO) Take  by mouth As Needed.     • magnesium gluconate (MAGONATE) 500 MG tablet Take 500 mg by mouth Daily.     • Multiple Vitamin (MULTI-VITAMIN DAILY PO) Take  by mouth Daily.     • omeprazole (priLOSEC) 40 MG capsule Take 40 mg by mouth Daily.     • ondansetron (ZOFRAN) 4 MG tablet Take 4 mg by mouth Every 8 (Eight) Hours As Needed for Nausea or Vomiting.     • potassium phosphate, monobasic, (K-PHOS) 500 MG tablet Take 500 mg by mouth Daily.     • rosuvastatin (CRESTOR) 40 MG tablet Take 40 mg by mouth Daily.     • tiZANidine (Zanaflex) 4 MG tablet Take 1 tablet by mouth At Night As Needed for Muscle Spasms. 30 tablet 1   • triamterene-hydrochlorothiazide (DYAZIDE) 37.5-25 MG per capsule Take 1 capsule by mouth Every Morning.     • vitamin B-12 (CYANOCOBALAMIN) 1000 MCG tablet Take 1,000 mcg by mouth Daily.       No current facility-administered medications on file prior to visit.       Allergies   Allergen Reactions   • Erythromycin Rash        Review of Systems   Constitutional: Positive for activity change and unexpected weight change. Negative for appetite change, chills, diaphoresis, fatigue and fever.   HENT: Positive for congestion, hearing loss, sneezing and trouble swallowing. Negative for dental problem, drooling, ear discharge, ear pain, facial swelling, mouth sores, nosebleeds, postnasal drip, rhinorrhea, sinus pressure, sore throat, tinnitus and voice change.    Eyes: Positive for itching. Negative for photophobia, pain, discharge, redness and visual disturbance.   Respiratory: Negative for apnea, cough, choking, chest tightness, shortness of breath, wheezing and stridor.    Cardiovascular: Negative for chest pain, palpitations and leg swelling.   Gastrointestinal: Positive for constipation.  "Negative for abdominal distention, abdominal pain, anal bleeding, blood in stool, diarrhea, nausea, rectal pain and vomiting.   Endocrine: Negative for cold intolerance, heat intolerance, polydipsia, polyphagia and polyuria.   Genitourinary: Negative for decreased urine volume, difficulty urinating, dysuria, enuresis, flank pain, frequency, genital sores, hematuria and urgency.   Musculoskeletal: Positive for arthralgias, back pain, joint swelling and neck stiffness. Negative for gait problem, myalgias and neck pain.   Skin: Negative for color change, pallor, rash and wound.   Allergic/Immunologic: Negative for environmental allergies, food allergies and immunocompromised state.   Neurological: Negative for dizziness, tremors, seizures, syncope, facial asymmetry, speech difficulty, weakness, light-headedness, numbness and headaches.   Hematological: Negative for adenopathy. Does not bruise/bleed easily.   Psychiatric/Behavioral: Negative for agitation, behavioral problems, confusion, decreased concentration, dysphoric mood, hallucinations, self-injury, sleep disturbance and suicidal ideas. The patient is not nervous/anxious and is not hyperactive.         Objective      Physical Exam  Pulse 80   Ht 148.6 cm (58.5\")   Wt 51.3 kg (113 lb 1.5 oz)   SpO2 97%   BMI 23.23 kg/m²     Body mass index is 23.23 kg/m².    General:   Mental Status:  Alert   Appearance: Cooperative, in no acute distress   Build and Nutrition: Well-nourished well-developed female   Orientation: Alert and oriented to person, place and time   Posture: Normal   Gait: Antalgic on the right.    Integument       • Right knee: No skin lesions, rash, or ecchymosis. She is wearing a knee sleeve.     Neurologic  • Sensation:       • Right foot:  Intact to light touch on the dorsal and plantar aspects    Motor       • Right lower extremity: 5/5 quadriceps, hamstrings, ankle dorsiflexors, and ankle plantar flexors     Vascular       • Right lower " extremity: 2+ dorsalis pedis pulse. Prompt capillary refill.    Lower Extremities  • Right Knee:        • Tenderness: Mild medial joint line tenderness.        • Swelling: None        • Effusion: Trace effusion.       • Crepitus: Positive crepitus.        • Atrophy: None       • Range of motion:             • Extension: 0°             • Flexion: 120°        • Instability: No varus or valgus laxity. Negative anterior drawer.       • Deformities: Mild varus alignment.    Imaging/Studies      Imaging Results (Last 24 Hours)     Procedure Component Value Units Date/Time    XR Knee 4+ View Right [[id number]] Resulted: 03/01/21 1608     Updated: 03/01/21 1608    Narrative:      Right Knee Radiographs  Indication: right knee pain  Views: Standing AP's and skiers of both knees, with lateral and sunrise   views of the right knee    Comparison: no prior studies available    Findings:   Near bone-on-bone contact medial compartment, diffuse osteopenia,   tricompartmental osteophytes, varus alignment, advanced patellofemoral   arthritis.  No acute bony abnormalities.  No unusual bony features.          Assessment and Plan     Diagnoses and all orders for this visit:    1. Primary osteoarthritis of right knee (Primary)  -     XR Knee 4+ View Right  -     Large Joint Arthrocentesis: R knee        1. Primary osteoarthritis of right knee        We discussed a trial of cortisone injection to the right knee before considering surgical intervention for her arthritis.  She prefers a conservative treatment approach.    Procedure Note:   The potential benefits of performing a therapeutic right knee joint injection, as well as potential risks (including, but not limited to infection, swelling, pain, bleeding, bruising, nerve/blood vessel damage, skin color changes, transient elevation in blood glucose levels, and fat atrophy) were discussed with the patient.  After informed consent was obtained, a timeout procedure was performed, and the  skin on the right knee was prepped with chlorhexidine soap and alcohol, after which ethyl chloride was applied to the skin at the injection site. Via the anterolateral approach, 1 ml of Kenalog 40 mg/ml mixed with 4 ml 0.5% ropivacaine plain was injected into the knee joint.  The patient tolerated the procedure well, experiencing 75% improvement a few minutes following the injection. There were no complications. A Band-Aid was applied to the injection site. Post-procedural instructions were given to the patient and/or their caregiver.    Return in about 3 months (around 6/1/2021).      Scribed for Jhony Griffin MD by ASH NAVA.  03/02/21   06:29 EST    I have personally performed the services described in this document as scribed by the above individual, and it is both accurate and complete.  Jhony Griffin MD  3/2/2021  15:22 EST

## 2021-03-02 RX ORDER — TRIAMCINOLONE ACETONIDE 40 MG/ML
40 INJECTION, SUSPENSION INTRA-ARTICULAR; INTRAMUSCULAR
Status: COMPLETED | OUTPATIENT
Start: 2021-03-01 | End: 2021-03-01

## 2021-03-02 RX ORDER — ROPIVACAINE HYDROCHLORIDE 5 MG/ML
4 INJECTION, SOLUTION EPIDURAL; INFILTRATION; PERINEURAL
Status: COMPLETED | OUTPATIENT
Start: 2021-03-01 | End: 2021-03-01

## 2021-03-18 ENCOUNTER — TREATMENT (OUTPATIENT)
Dept: PHYSICAL THERAPY | Facility: CLINIC | Age: 81
End: 2021-03-18

## 2021-03-18 DIAGNOSIS — G89.29 CHRONIC LOW BACK PAIN, UNSPECIFIED BACK PAIN LATERALITY, UNSPECIFIED WHETHER SCIATICA PRESENT: Primary | ICD-10-CM

## 2021-03-18 DIAGNOSIS — M54.50 CHRONIC LOW BACK PAIN, UNSPECIFIED BACK PAIN LATERALITY, UNSPECIFIED WHETHER SCIATICA PRESENT: Primary | ICD-10-CM

## 2021-03-18 PROCEDURE — 97140 MANUAL THERAPY 1/> REGIONS: CPT | Performed by: PHYSICAL THERAPIST

## 2021-03-18 PROCEDURE — 97112 NEUROMUSCULAR REEDUCATION: CPT | Performed by: PHYSICAL THERAPIST

## 2021-03-18 PROCEDURE — 97110 THERAPEUTIC EXERCISES: CPT | Performed by: PHYSICAL THERAPIST

## 2021-03-23 NOTE — PROGRESS NOTES
Physical Therapy Daily Progress Note    Subjective   Danay Gonzales reports that she feels like she has had more pain in the low back and knees because she has not been able to come to therapy as frequently.       Objective   See Exercise, Manual, and Modality Logs for complete treatment.       Assessment/Plan     Pt responded well to therapy today and reported a decrease in pain in her low back. We did not address issues with the knee, but we will resume treatment for the knee at the next visit.     Progress per Plan of Care and Progress strengthening /stabilization /functional activity           Manual Therapy:    12     mins  33323;  Therapeutic Exercise:    15     mins  40516;     Neuromuscular Pankaj:    28    mins  24278;    Therapeutic Activity:          mins  51273;     Gait Training:           mins  33979;     Ultrasound:          mins  27607;    Electrical Stimulation:         mins  52318 ( );  E-Stim Attended:         mins  66416  Iontophoresis          mins 46718   Traction          mins  60791  Fluidotherapy          mins  11463  Dry Needling          mins self-pay - No Charge  Paraffin          mins  43932    Timed Treatment:   55   mins   Total Treatment:     55   mins    Justus Schaeffer, PT, DPT, OCS, Cert. DN  Physical Therapist

## 2021-03-25 ENCOUNTER — TREATMENT (OUTPATIENT)
Dept: PHYSICAL THERAPY | Facility: CLINIC | Age: 81
End: 2021-03-25

## 2021-03-25 DIAGNOSIS — G89.29 CHRONIC LOW BACK PAIN, UNSPECIFIED BACK PAIN LATERALITY, UNSPECIFIED WHETHER SCIATICA PRESENT: Primary | ICD-10-CM

## 2021-03-25 DIAGNOSIS — M54.41 CHRONIC MIDLINE LOW BACK PAIN WITH RIGHT-SIDED SCIATICA: ICD-10-CM

## 2021-03-25 DIAGNOSIS — M54.50 CHRONIC LOW BACK PAIN, UNSPECIFIED BACK PAIN LATERALITY, UNSPECIFIED WHETHER SCIATICA PRESENT: Primary | ICD-10-CM

## 2021-03-25 DIAGNOSIS — G89.29 CHRONIC MIDLINE LOW BACK PAIN WITH RIGHT-SIDED SCIATICA: ICD-10-CM

## 2021-03-25 PROCEDURE — 97112 NEUROMUSCULAR REEDUCATION: CPT | Performed by: PHYSICAL THERAPIST

## 2021-03-25 PROCEDURE — 97140 MANUAL THERAPY 1/> REGIONS: CPT | Performed by: PHYSICAL THERAPIST

## 2021-03-25 PROCEDURE — 97110 THERAPEUTIC EXERCISES: CPT | Performed by: PHYSICAL THERAPIST

## 2021-03-25 PROCEDURE — 97530 THERAPEUTIC ACTIVITIES: CPT | Performed by: PHYSICAL THERAPIST

## 2021-03-30 NOTE — PROGRESS NOTES
Physical Therapy Daily Progress Note    Subjective   Danay Gonzales reports that she is feeling good overall and she had some decrease in her symptoms after the last visit.       Objective   See Exercise, Manual, and Modality Logs for complete treatment.       Assessment/Plan     Pt is making steady progress with therapy and she demonstrates an improvement in her ability to perform exercise with the knees. Her low back pain seems to be well managed.     Progress per Plan of Care and Progress strengthening /stabilization /functional activity           Manual Therapy:    12     mins  21663;  Therapeutic Exercise:    15     mins  48062;     Neuromuscular Pankaj:    15    mins  79457;    Therapeutic Activity:     15     mins  77193;     Gait Training:           mins  88062;     Ultrasound:          mins  08288;    Electrical Stimulation:         mins  94165 ( );  E-Stim Attended:         mins  59353  Iontophoresis          mins 79423   Traction          mins  06564  Fluidotherapy          mins  98400  Dry Needling          mins self-pay - No Charge  Paraffin          mins  25116    Timed Treatment:   57   mins   Total Treatment:     57   mins    Justus Schaeffer, PT, DPT, OCS, Cert. DN  Physical Therapist

## 2021-04-01 ENCOUNTER — TREATMENT (OUTPATIENT)
Dept: PHYSICAL THERAPY | Facility: CLINIC | Age: 81
End: 2021-04-01

## 2021-04-01 DIAGNOSIS — G89.29 CHRONIC LOW BACK PAIN, UNSPECIFIED BACK PAIN LATERALITY, UNSPECIFIED WHETHER SCIATICA PRESENT: Primary | ICD-10-CM

## 2021-04-01 DIAGNOSIS — M54.50 CHRONIC LOW BACK PAIN, UNSPECIFIED BACK PAIN LATERALITY, UNSPECIFIED WHETHER SCIATICA PRESENT: Primary | ICD-10-CM

## 2021-04-01 PROCEDURE — 97530 THERAPEUTIC ACTIVITIES: CPT | Performed by: PHYSICAL THERAPIST

## 2021-04-01 PROCEDURE — 97112 NEUROMUSCULAR REEDUCATION: CPT | Performed by: PHYSICAL THERAPIST

## 2021-04-01 PROCEDURE — 97110 THERAPEUTIC EXERCISES: CPT | Performed by: PHYSICAL THERAPIST

## 2021-04-01 PROCEDURE — 97140 MANUAL THERAPY 1/> REGIONS: CPT | Performed by: PHYSICAL THERAPIST

## 2021-04-03 NOTE — PROGRESS NOTES
Physical Therapy Daily Progress Note    Subjective   Danay Gonzales reports that her back is feeling much better. She is still dealing with some pain in the right knee, but this seems to improve with treatment.       Objective   See Exercise, Manual, and Modality Logs for complete treatment.       Assessment/Plan     Pt is making steady progress with therapy and she shows an improvement in strength and endurance. Will cont to progress as indicated.     Progress per Plan of Care and Progress strengthening /stabilization /functional activity           Manual Therapy:    12     mins  65743;  Therapeutic Exercise:    15     mins  76105;     Neuromuscular Pankaj:    15    mins  06002;    Therapeutic Activity:     14     mins  79886;     Gait Training:           mins  25317;     Ultrasound:          mins  71294;    Electrical Stimulation:         mins  36372 ( );  E-Stim Attended:         mins  48367  Iontophoresis          mins 78328   Traction          mins  16818  Fluidotherapy          mins  70514  Dry Needling          mins self-pay - No Charge  Paraffin          mins  35024    Timed Treatment:   56   mins   Total Treatment:     56   mins    Justus Schaeffer, PT, DPT, OCS, Cert. DN  Physical Therapist

## 2021-04-08 ENCOUNTER — TREATMENT (OUTPATIENT)
Dept: PHYSICAL THERAPY | Facility: CLINIC | Age: 81
End: 2021-04-08

## 2021-04-08 DIAGNOSIS — G89.29 CHRONIC LOW BACK PAIN, UNSPECIFIED BACK PAIN LATERALITY, UNSPECIFIED WHETHER SCIATICA PRESENT: Primary | ICD-10-CM

## 2021-04-08 DIAGNOSIS — M54.50 CHRONIC LOW BACK PAIN, UNSPECIFIED BACK PAIN LATERALITY, UNSPECIFIED WHETHER SCIATICA PRESENT: Primary | ICD-10-CM

## 2021-04-08 PROCEDURE — 97530 THERAPEUTIC ACTIVITIES: CPT | Performed by: PHYSICAL THERAPIST

## 2021-04-08 PROCEDURE — 97140 MANUAL THERAPY 1/> REGIONS: CPT | Performed by: PHYSICAL THERAPIST

## 2021-04-08 PROCEDURE — 97112 NEUROMUSCULAR REEDUCATION: CPT | Performed by: PHYSICAL THERAPIST

## 2021-04-08 PROCEDURE — 97110 THERAPEUTIC EXERCISES: CPT | Performed by: PHYSICAL THERAPIST

## 2021-04-08 NOTE — PROGRESS NOTES
Physical Therapy Daily Progress Note    Subjective   Danay Gonzales reports that her back is feeling very good and she has had a lot less pain in the knee recently as well. She has not felt the need to wear her brace for the past few days.       Objective   See Exercise, Manual, and Modality Logs for complete treatment.       Assessment/Plan     Pt is progressing well and she demonstrates an improvement in strength and endurance. Will cont to progress as tolerated.     Progress per Plan of Care and Progress strengthening /stabilization /functional activity           Manual Therapy:    14     mins  23058;  Therapeutic Exercise:    12     mins  42428;     Neuromuscular Pankaj:    15    mins  46691;    Therapeutic Activity:     15     mins  90434;     Gait Training:           mins  75242;     Ultrasound:          mins  80315;    Electrical Stimulation:         mins  77872 ( );  E-Stim Attended:         mins  59788  Iontophoresis          mins 49432   Traction          mins  27224  Fluidotherapy          mins  19022  Dry Needling          mins self-pay - No Charge  Paraffin          mins  49933    Timed Treatment:   56   mins   Total Treatment:     56   mins    Justus Schaeffer, PT, DPT, OCS, Cert. DN  Physical Therapist

## 2021-04-14 ENCOUNTER — OFFICE VISIT (OUTPATIENT)
Dept: CARDIOLOGY | Facility: CLINIC | Age: 81
End: 2021-04-14

## 2021-04-14 VITALS
HEIGHT: 59 IN | SYSTOLIC BLOOD PRESSURE: 132 MMHG | OXYGEN SATURATION: 98 % | WEIGHT: 116 LBS | BODY MASS INDEX: 23.39 KG/M2 | DIASTOLIC BLOOD PRESSURE: 60 MMHG | HEART RATE: 80 BPM

## 2021-04-14 DIAGNOSIS — I73.9 PVD (PERIPHERAL VASCULAR DISEASE) (HCC): Primary | ICD-10-CM

## 2021-04-14 DIAGNOSIS — E78.5 HYPERLIPIDEMIA LDL GOAL <70: ICD-10-CM

## 2021-04-14 PROCEDURE — 99213 OFFICE O/P EST LOW 20 MIN: CPT | Performed by: INTERNAL MEDICINE

## 2021-04-14 RX ORDER — DEXLANSOPRAZOLE 60 MG/1
1 CAPSULE, DELAYED RELEASE ORAL AS NEEDED
COMMUNITY
Start: 2021-03-26 | End: 2021-09-15

## 2021-04-14 NOTE — PROGRESS NOTES
[facility]  Office visit  Danay Gonzales  1940    There is no work phone number on file.    VISIT DATE:  4/14/2021      PCP: DEANN Alejandra MD  [address] [address]    CC:  Chief Complaint   Patient presents with   • Chest Pain   • PVD (peripheral vascular disease)       Previous cardiac studies and procedures:  MRA head and neck 2014  1. No acute intracranial process.   2. Focal area of 50% stenosis in the proximal and/or M1 portion of the  left middle cerebral artery.   3. Normal neck MRA.    January 2019 carotid duplex  · Proximal right internal carotid artery plaque without significant stenosis.  · Right internal carotid artery stenosis of 0-49%.  · Proximal left internal carotid artery plaque without significant stenosis.  · Left internal carotid artery stenosis of 0-49%.  · Incidental finding: left sided fluid filled cyst measuring (1.3 X 1.3 ) adjacent to left common carotid.  · Blunted and delayed waveforms throughout left common carotid, external carotid, and internal carotid arteries suggestive of significant proximal disease. Consider CTA for further evaluation.    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    ASSESSMENT:   Diagnosis Plan   1. PVD (peripheral vascular disease) (CMS/HCC)     2. Hyperlipidemia LDL goal <70  Lipid Panel       PLAN:  Left internal carotid artery stenosis: Greater than 90% ostial stenosis.  Imaging consistent with soft plaque.  Currently asymptomatic.  Anatomic variant regarding origins of the great vessels from the aortic arch increase the technical challenge of any potential percutaneous intervention.  Recommending continued  "aggressive medical therapy as long as she is asymptomatic.  Continue aspirin and afterload reduction.  Goal blood pressure less than 130/80 mmHg.  Continue rosuvastatin to 40 mg p.o. daily, goal LDL less than 70.  Lipid profile pending today.    Intracranial atherosclerotic disease: Currently symptomatic.  Continue aggressive risk factor modification.    Hyperlipidemia: Goal LDL less than 70, currently well controlled.  Continue high intensity statin therapy.    Subjective  Most of her limitations are orthopedic in nature, improvement in right knee pain after recent injection.  She is compliant with medical therapy.  No limiting dyspnea on exertion.  No exertional chest discomfort.  Denies lightheadedness, visual changes, headache, paresthesias or focal weakness.    PHYSICAL EXAMINATION:  Vitals:    04/14/21 1335   BP: 132/60   BP Location: Left arm   Patient Position: Sitting   Pulse: 80   SpO2: 98%   Weight: 52.6 kg (116 lb)   Height: 149.9 cm (59\")     General Appearance:    Alert, cooperative, no distress, appears stated age   Head:    Normocephalic, without obvious abnormality, atraumatic   Eyes:    conjunctiva/corneas clear   Nose:   Nares normal, septum midline, mucosa normal, no drainage   Throat:   Lips, teeth and gums normal   Neck:   Supple, symmetrical, trachea midline, no carotid    bruit or JVD   Lungs:     Clear to auscultation bilaterally, respirations unlabored   Chest Wall:    No tenderness or deformity    Heart:    Regular rate and rhythm, S1 and S2 normal, 2/6 early peaking systolic murmur right upper sternal border, rub   or gallop, bilateral carotid bruit, left greater than right.   Abdomen:     Soft, non-tender   Extremities:   Extremities normal, atraumatic, no cyanosis or edema   Pulses:   2+ and symmetric all extremities   Skin:   Skin color, texture, turgor normal, no rashes or lesions       Diagnostic Data:  Procedures  Lab Results   Component Value Date    TRIG 189 (H) 03/22/2019    HDL " 32 (L) 03/22/2019     Lab Results   Component Value Date    GLUCOSE 137 (H) 06/07/2014    BUN 19 06/07/2014    CREATININE 1.50 (H) 03/22/2019     06/07/2014    K 4.3 11/24/2020     06/07/2014    CO2 26 06/07/2014     Lab Results   Component Value Date    HGBA1C 7.20 (H) 11/19/2020     Lab Results   Component Value Date    WBC 6.38 11/19/2020    HGB 11.5 (L) 11/19/2020    HCT 36.5 11/19/2020     11/19/2020       Allergies  Allergies   Allergen Reactions   • Erythromycin Rash       Current Medications    Current Outpatient Medications:   •  acetaminophen (TYLENOL) 500 MG tablet, Take 500 mg by mouth Every 6 (Six) Hours As Needed., Disp: , Rfl:   •  aspirin  MG tablet, Take 325 mg by mouth Daily. Patient states Dr. Graves told her not to stop prior to surgery, Disp: , Rfl:   •  calcitriol (ROCALTROL) 0.25 MCG capsule, Take 0.25 mcg by mouth As Needed., Disp: , Rfl:   •  calcium carbonate (OS-YASMANY) 600 MG tablet, Take 600 mg by mouth Daily., Disp: , Rfl:   •  Cholecalciferol (VITAMIN D3) 125 MCG (5000 UT) capsule capsule, Take 5,000 Units by mouth Daily., Disp: , Rfl:   •  Dexilant 60 MG capsule, Take 1 capsule by mouth As Needed., Disp: , Rfl:   •  gabapentin (NEURONTIN) 100 MG capsule, Take 1 capsule by mouth Every Night., Disp: 30 capsule, Rfl: 0  •  levothyroxine (SYNTHROID, LEVOTHROID) 75 MCG tablet, Take 75 mcg by mouth Daily., Disp: , Rfl:   •  Loperamide HCl (IMODIUM A-D PO), Take 1 tablet by mouth As Needed., Disp: , Rfl:   •  magnesium gluconate (MAGONATE) 500 MG tablet, Take 500 mg by mouth Daily., Disp: , Rfl:   •  Multiple Vitamin (MULTI-VITAMIN DAILY PO), Take 1 tablet by mouth Daily., Disp: , Rfl:   •  omeprazole (priLOSEC) 40 MG capsule, Take 40 mg by mouth Daily., Disp: , Rfl:   •  ondansetron (ZOFRAN) 4 MG tablet, Take 4 mg by mouth Every 8 (Eight) Hours As Needed for Nausea or Vomiting., Disp: , Rfl:   •  potassium phosphate, monobasic, (K-PHOS) 500 MG tablet, Take 500 mg by  mouth Daily., Disp: , Rfl:   •  rosuvastatin (CRESTOR) 40 MG tablet, Take 40 mg by mouth Daily., Disp: , Rfl:   •  triamterene-hydrochlorothiazide (DYAZIDE) 37.5-25 MG per capsule, Take 1 capsule by mouth Every Morning., Disp: , Rfl:   •  vitamin B-12 (CYANOCOBALAMIN) 1000 MCG tablet, Take 1,000 mcg by mouth Daily., Disp: , Rfl:           ROS  Review of Systems   Cardiovascular: Positive for dyspnea on exertion. Negative for chest pain and irregular heartbeat.   Respiratory: Positive for cough and shortness of breath.        SOCIAL HX  Social History     Socioeconomic History   • Marital status:      Spouse name: Not on file   • Number of children: Not on file   • Years of education: Not on file   • Highest education level: Not on file   Tobacco Use   • Smoking status: Never Smoker   • Smokeless tobacco: Never Used   Vaping Use   • Vaping Use: Never used   Substance and Sexual Activity   • Alcohol use: No   • Drug use: No   • Sexual activity: Defer       FAMILY HX  Family History   Problem Relation Age of Onset   • Heart disease Mother    • Heart attack Mother    • Stroke Mother    • Lung disease Father    • Heart disease Sister    • Cancer Sister    • Heart disease Brother    • Stroke Brother    • Cancer Sister    • Cancer Brother    • Stroke Brother    • Heart disease Brother    • Heart disease Brother    • Lung disease Sister    • COPD Sister    • Emphysema Sister    • Breast cancer Neg Hx    • Ovarian cancer Neg Hx              Neal Abernathy III, MD, [facility]

## 2021-04-15 ENCOUNTER — TREATMENT (OUTPATIENT)
Dept: PHYSICAL THERAPY | Facility: CLINIC | Age: 81
End: 2021-04-15

## 2021-04-15 DIAGNOSIS — M54.50 CHRONIC LOW BACK PAIN, UNSPECIFIED BACK PAIN LATERALITY, UNSPECIFIED WHETHER SCIATICA PRESENT: Primary | ICD-10-CM

## 2021-04-15 DIAGNOSIS — G89.29 CHRONIC LOW BACK PAIN, UNSPECIFIED BACK PAIN LATERALITY, UNSPECIFIED WHETHER SCIATICA PRESENT: Primary | ICD-10-CM

## 2021-04-15 LAB
CHOLEST SERPL-MCNC: 152 MG/DL (ref 0–200)
HDLC SERPL-MCNC: 30 MG/DL (ref 40–60)
LDLC SERPL CALC-MCNC: 64 MG/DL (ref 0–100)
TRIGL SERPL-MCNC: 375 MG/DL (ref 0–150)
VLDLC SERPL CALC-MCNC: 58 MG/DL (ref 5–40)

## 2021-04-15 PROCEDURE — 97530 THERAPEUTIC ACTIVITIES: CPT | Performed by: PHYSICAL THERAPIST

## 2021-04-15 PROCEDURE — 97112 NEUROMUSCULAR REEDUCATION: CPT | Performed by: PHYSICAL THERAPIST

## 2021-04-15 PROCEDURE — 97110 THERAPEUTIC EXERCISES: CPT | Performed by: PHYSICAL THERAPIST

## 2021-04-15 RX ORDER — ICOSAPENT ETHYL 1000 MG/1
2 CAPSULE ORAL 2 TIMES DAILY WITH MEALS
Qty: 120 CAPSULE | Refills: 5 | Status: SHIPPED | OUTPATIENT
Start: 2021-04-15 | End: 2022-08-18

## 2021-04-15 NOTE — TELEPHONE ENCOUNTER
----- Message from Neal Abernathy III, MD sent at 4/15/2021 10:46 AM EDT -----  Back cholesterol level is currently being well controlled.  Continue current dose of rosuvastatin.  She still has elevated triglycerides and a low good cholesterol level.  Would recommend starting Vascepa 2 g p.o. twice daily unless it is cost prohibitive.

## 2021-04-15 NOTE — PROGRESS NOTES
Physical Therapy Daily Progress Note    Subjective   Danay Gonzales reports that her knee has been feeling a lot better and she has decreased how much she is using her brace. She still fees like she has some weakness in the right leg.       Objective   See Exercise, Manual, and Modality Logs for complete treatment.       Assessment/Plan     Pt is making steady progress with therapy and she demonstrates an improvement in her overall strength and tolerance to activity. Will cont to progress as indicated.     Progress per Plan of Care and Progress strengthening /stabilization /functional activity           Manual Therapy:         mins  14402;  Therapeutic Exercise:    10     mins  03440;     Neuromuscular Pankaj:    18    mins  96503;    Therapeutic Activity:     30     mins  17698;     Gait Training:           mins  99569;     Ultrasound:          mins  14947;    Electrical Stimulation:         mins  45532 ( );  E-Stim Attended:         mins  95140  Iontophoresis          mins 72062   Traction          mins  03143  Fluidotherapy          mins  13923  Dry Needling          mins self-pay - No Charge  Paraffin          mins  17206    Timed Treatment:   58   mins   Total Treatment:     58   mins    Justus Schaeffer, PT, DPT, OCS, Cert. DN  Physical Therapist

## 2021-04-21 ENCOUNTER — OFFICE VISIT (OUTPATIENT)
Dept: NEUROSURGERY | Facility: CLINIC | Age: 81
End: 2021-04-21

## 2021-04-21 VITALS
WEIGHT: 116.6 LBS | SYSTOLIC BLOOD PRESSURE: 120 MMHG | BODY MASS INDEX: 23.51 KG/M2 | HEIGHT: 59 IN | TEMPERATURE: 97.1 F | DIASTOLIC BLOOD PRESSURE: 52 MMHG

## 2021-04-21 DIAGNOSIS — M25.561 RIGHT KNEE PAIN, UNSPECIFIED CHRONICITY: ICD-10-CM

## 2021-04-21 DIAGNOSIS — Z98.890 S/P LUMBAR LAMINECTOMY: Primary | ICD-10-CM

## 2021-04-21 DIAGNOSIS — M48.062 LUMBAR STENOSIS WITH NEUROGENIC CLAUDICATION: ICD-10-CM

## 2021-04-21 PROCEDURE — 99213 OFFICE O/P EST LOW 20 MIN: CPT | Performed by: PHYSICIAN ASSISTANT

## 2021-04-21 NOTE — PROGRESS NOTES
Patient: Danay Gonzales  : 1940  GENDER: female    Primary Care Provider: DEANN Alejandra MD    Requesting Provider: As above      History    Chief Complaint: Low back and bilateral lower extremity pain with walking/standing intolerances    History of Present Illness: Ms. Gonzales is an 80-year-old female who formally worked in [employer].  She presented to our service with progressive back and lower extremity pain with walking and standing intolerances.  Preoperative studies demonstrated generous stenosis at L3-4, and L4-5, with moderate to severe stenosis at L5-S1.  As such patient presented for lumbar decompression from L2-5 with a right L4-5 discectomy on 2020.    Presently, Ms. Gonzales is 5 months postop.  She continues to be pleased with her current postoperative progress.  She notes an 80-90% improvement in her low back and walking intolerance overall.  Her gait has dramatically improved and she no longer requires the use of pain medication.  Since last in the office she was evaluated by Dr. Griffin who diagnosed her with primary osteoarthritis of the right knee and administered a cortisone injection.  Her right knee pain has dramatically improved.  In turn her low back complaints have also improved since her gait function is less strained.  She continues on gabapentin 100 mg at bedtime as well as Tylenol in the mornings.  She has no other complaints at this time.      Review of Systems   Constitutional: Negative for activity change, appetite change, chills, diaphoresis, fatigue, fever and unexpected weight change.   HENT: Negative for congestion, dental problem, drooling, ear discharge, ear pain, facial swelling, hearing loss, mouth sores, nosebleeds, postnasal drip, rhinorrhea, sinus pressure, sinus pain, sneezing, sore throat, tinnitus, trouble swallowing and voice change.    Eyes: Negative for photophobia, pain, discharge, redness, itching and visual disturbance.   Respiratory: Negative for apnea,  cough, choking, chest tightness, shortness of breath, wheezing and stridor.    Cardiovascular: Negative for chest pain, palpitations and leg swelling.   Gastrointestinal: Negative for abdominal distention, abdominal pain, anal bleeding, blood in stool, constipation, diarrhea, nausea, rectal pain and vomiting.   Endocrine: Negative for cold intolerance, heat intolerance, polydipsia, polyphagia and polyuria.   Genitourinary: Negative for decreased urine volume, difficulty urinating, dyspareunia, dysuria, enuresis, flank pain, frequency, genital sores, hematuria, menstrual problem, pelvic pain, urgency, vaginal bleeding, vaginal discharge and vaginal pain.   Musculoskeletal: Negative for arthralgias, back pain, gait problem, joint swelling, myalgias, neck pain and neck stiffness.   Skin: Negative for color change, pallor, rash and wound.   Allergic/Immunologic: Negative for environmental allergies, food allergies and immunocompromised state.   Neurological: Negative for dizziness, tremors, seizures, syncope, facial asymmetry, speech difficulty, weakness, light-headedness, numbness and headaches.   Hematological: Negative for adenopathy. Does not bruise/bleed easily.   Psychiatric/Behavioral: Negative for agitation, behavioral problems, confusion, decreased concentration, dysphoric mood, hallucinations, self-injury, sleep disturbance and suicidal ideas. The patient is not nervous/anxious and is not hyperactive.        Past Medical History:   Diagnosis Date   • Anemia    • Arthritis    • Cancer (CMS/HCC)     skin   • Chronic constipation    • Diabetes mellitus (CMS/HCC)     borderline - no medications   • Disease of thyroid gland    • GERD (gastroesophageal reflux disease)    • Glaucoma    • Headache    • Hearing aid worn    • History of bronchitis    • History of migraine    • History of pneumonia    • Hypertension    • Kidney disease    • Low back pain    • Lumbar stenosis    • Menopause    • Mumps    • Osteoporosis     • PVD (peripheral vascular disease) (CMS/HCC)    • Stroke (CMS/HCC)     right hand weakness    • Vertigo    • Wears glasses      Past Surgical History:   Procedure Laterality Date   • BREAST BIOPSY Bilateral     BOTH BREASTS US BX, PT DOES NOT REMEMBER WHEN   • CATARACT EXTRACTION, BILATERAL     • COLONOSCOPY  10/2016   • HYSTERECTOMY  1981    Complete   • LUMBAR LAMINECTOMY DISCECTOMY DECOMPRESSION N/A 11/24/2020    Procedure: LUMBAR LAMINECTOMY L3- S1;  Surgeon: Jae Graves MD;  [facility]: [facility];  Service: Neurosurgery;  Laterality: N/A;   • OOPHORECTOMY      Comnplete Hysterectomy   • OTHER SURGICAL HISTORY  1999    Scalp   • SKIN CANCER EXCISION Left 2005    Hand   • TONSILLECTOMY  1944   • UPPER GASTROINTESTINAL ENDOSCOPY  10/2016    Esophageal Stretching       Current Outpatient Medications:   •  acetaminophen (TYLENOL) 500 MG tablet, Take 500 mg by mouth Every 6 (Six) Hours As Needed., Disp: , Rfl:   •  aspirin  MG tablet, Take 325 mg by mouth Daily. Patient states Dr. Graves told her not to stop prior to surgery, Disp: , Rfl:   •  calcitriol (ROCALTROL) 0.25 MCG capsule, Take 0.25 mcg by mouth As Needed., Disp: , Rfl:   •  calcium carbonate (OS-YASMANY) 600 MG tablet, Take 600 mg by mouth Daily., Disp: , Rfl:   •  Cholecalciferol (VITAMIN D3) 125 MCG (5000 UT) capsule capsule, Take 5,000 Units by mouth Daily., Disp: , Rfl:   •  Dexilant 60 MG capsule, Take 1 capsule by mouth As Needed., Disp: , Rfl:   •  gabapentin (NEURONTIN) 100 MG capsule, Take 1 capsule by mouth Every Night., Disp: 30 capsule, Rfl: 0  •  icosapent ethyl (Vascepa) 1 g capsule capsule, Take 2 g by mouth 2 (Two) Times a Day With Meals., Disp: 120 capsule, Rfl: 5  •  levothyroxine (SYNTHROID, LEVOTHROID) 75 MCG tablet, Take 75 mcg by mouth Daily., Disp: , Rfl:   •  Loperamide HCl (IMODIUM A-D PO), Take 1 tablet by mouth As Needed., Disp: , Rfl:   •  magnesium gluconate (MAGONATE) 500 MG tablet, Take 500 mg by mouth Daily., Disp:  ", Rfl:   •  Multiple Vitamin (MULTI-VITAMIN DAILY PO), Take 1 tablet by mouth Daily., Disp: , Rfl:   •  omeprazole (priLOSEC) 40 MG capsule, Take 40 mg by mouth Daily., Disp: , Rfl:   •  ondansetron (ZOFRAN) 4 MG tablet, Take 4 mg by mouth Every 8 (Eight) Hours As Needed for Nausea or Vomiting., Disp: , Rfl:   •  potassium phosphate, monobasic, (K-PHOS) 500 MG tablet, Take 500 mg by mouth Daily., Disp: , Rfl:   •  rosuvastatin (CRESTOR) 40 MG tablet, Take 40 mg by mouth Daily., Disp: , Rfl:   •  triamterene-hydrochlorothiazide (DYAZIDE) 37.5-25 MG per capsule, Take 1 capsule by mouth Every Morning., Disp: , Rfl:   •  vitamin B-12 (CYANOCOBALAMIN) 1000 MCG tablet, Take 1,000 mcg by mouth Daily., Disp: , Rfl:     Allergies   Allergen Reactions   • Erythromycin Rash       The patient's review of systems, past medical history, past surgical history, family history, and social history have been reviewed at length in the electronic medical record.    Physical Exam:   /52 (BP Location: Left arm, Patient Position: Sitting, Cuff Size: Adult)   Temp 97.1 °F (36.2 °C)   Ht 149.9 cm (59\")   Wt 52.9 kg (116 lb 9.6 oz)   BMI 23.55 kg/m²   CONSTITUTIONAL:   - Patient is well-nourished  - Pleasant and appears stated age.  PSYCHIATRIC:  - Normal mood and affect  - Behavior is normal.  - Thought content is normal  HENT:   Head: Normocephalic and Atraumatic.   Eyes:     - Pupils are equal, round, and reactive to light.     - EOM are normal.   CV:   - Regular rate and rhythm on palpable radial pulse   - No murmur appreciated   PULMONARY:   - Speaking in full sentences  - No use of accessory muscles   - Breathing is non-labored   - No wheezing   SKIN:  - Clean, dry and intact   - well healed surgical incision  MUSCULOSKELETAL:  - Back tenderness to palpation is not observed.   - ROM in back is normal.  - Straight leg raise is negative   NEUROLOGICAL:  - A&Ox3  - Attention span, language function, and cognition are intact.  - " Strength is intact in the upper and lower extremities to direct testing.  - Muscle tone is normal throughout.  - Station and gait are normal.  - Sensation is intact to light touch testing throughout.  - Deep tendon reflexes are difficult to elicit throughout       Patient's Body mass index is 23.55 kg/m². BMI is within normal parameters. No follow-up required..         Medical Decision Making    Data Review:   - no new images to review    Diagnosis/Treatment Options:  1. S/P lumbar laminectomy  2. Lumbar stenosis with neurogenic claudication  3. Right knee pain, unspecified chronicity       Follow up:  Ms. Gonzales is seen today in follow-up 5 months after undergoing multilevel lumbar decompression with a right L4-5 discectomy on 11/24/2020.  Patient continues to do well with her recovery.  Given that her right knee no longer gives her trouble, her activity level has greatly improved.  We went over some general do's/don'ts moving forward.  Patient verbalized understanding.  She will continue to observe and will be seen back in our office on an as-needed basis.    Thea Gutierrez PA-C   4/21/2021   10:13 EDT

## 2021-04-22 ENCOUNTER — TREATMENT (OUTPATIENT)
Dept: PHYSICAL THERAPY | Facility: CLINIC | Age: 81
End: 2021-04-22

## 2021-04-22 DIAGNOSIS — M54.50 CHRONIC LOW BACK PAIN, UNSPECIFIED BACK PAIN LATERALITY, UNSPECIFIED WHETHER SCIATICA PRESENT: Primary | ICD-10-CM

## 2021-04-22 DIAGNOSIS — G89.29 CHRONIC LOW BACK PAIN, UNSPECIFIED BACK PAIN LATERALITY, UNSPECIFIED WHETHER SCIATICA PRESENT: Primary | ICD-10-CM

## 2021-04-22 PROCEDURE — 97112 NEUROMUSCULAR REEDUCATION: CPT | Performed by: PHYSICAL THERAPIST

## 2021-04-22 PROCEDURE — 97140 MANUAL THERAPY 1/> REGIONS: CPT | Performed by: PHYSICAL THERAPIST

## 2021-04-22 PROCEDURE — 97110 THERAPEUTIC EXERCISES: CPT | Performed by: PHYSICAL THERAPIST

## 2021-04-25 NOTE — PROGRESS NOTES
Physical Therapy Daily Progress Note    Subjective   Danay Gonzales reports that she is feeling very good and she feels that her back is stronger.       Objective   See Exercise, Manual, and Modality Logs for complete treatment.       Assessment/Plan     Pt has responded well to treatment and she demonstrates an improvement in her tolerance to activity and overall strength.     Progress per Plan of Care and Progress strengthening /stabilization /functional activity           Manual Therapy:    13     mins  84427;  Therapeutic Exercise:    15     mins  76444;     Neuromuscular Pankaj:    30    mins  42519;    Therapeutic Activity:          mins  34260;     Gait Training:           mins  45795;     Ultrasound:          mins  28183;    Electrical Stimulation:         mins  54531 ( );  E-Stim Attended:         mins  79882  Iontophoresis          mins 99646   Traction          mins  29131  Fluidotherapy          mins  93288  Dry Needling          mins self-pay - No Charge  Paraffin          mins  11098    Timed Treatment:   58   mins   Total Treatment:     58   mins    Justus Schaeffer, PT, DPT, OCS, Cert. DN  Physical Therapist

## 2021-04-27 ENCOUNTER — TREATMENT (OUTPATIENT)
Dept: PHYSICAL THERAPY | Facility: CLINIC | Age: 81
End: 2021-04-27

## 2021-04-27 DIAGNOSIS — M54.50 CHRONIC LOW BACK PAIN, UNSPECIFIED BACK PAIN LATERALITY, UNSPECIFIED WHETHER SCIATICA PRESENT: Primary | ICD-10-CM

## 2021-04-27 DIAGNOSIS — G89.29 CHRONIC LOW BACK PAIN, UNSPECIFIED BACK PAIN LATERALITY, UNSPECIFIED WHETHER SCIATICA PRESENT: Primary | ICD-10-CM

## 2021-04-27 PROCEDURE — 97530 THERAPEUTIC ACTIVITIES: CPT | Performed by: PHYSICAL THERAPIST

## 2021-04-27 PROCEDURE — 97112 NEUROMUSCULAR REEDUCATION: CPT | Performed by: PHYSICAL THERAPIST

## 2021-04-27 PROCEDURE — 97110 THERAPEUTIC EXERCISES: CPT | Performed by: PHYSICAL THERAPIST

## 2021-04-29 ENCOUNTER — TREATMENT (OUTPATIENT)
Dept: PHYSICAL THERAPY | Facility: CLINIC | Age: 81
End: 2021-04-29

## 2021-04-29 DIAGNOSIS — M54.50 CHRONIC LOW BACK PAIN, UNSPECIFIED BACK PAIN LATERALITY, UNSPECIFIED WHETHER SCIATICA PRESENT: Primary | ICD-10-CM

## 2021-04-29 DIAGNOSIS — G89.29 CHRONIC LOW BACK PAIN, UNSPECIFIED BACK PAIN LATERALITY, UNSPECIFIED WHETHER SCIATICA PRESENT: Primary | ICD-10-CM

## 2021-04-29 PROCEDURE — 97110 THERAPEUTIC EXERCISES: CPT | Performed by: PHYSICAL THERAPIST

## 2021-04-29 PROCEDURE — 97112 NEUROMUSCULAR REEDUCATION: CPT | Performed by: PHYSICAL THERAPIST

## 2021-04-29 PROCEDURE — 97530 THERAPEUTIC ACTIVITIES: CPT | Performed by: PHYSICAL THERAPIST

## 2021-04-30 NOTE — PROGRESS NOTES
Physical Therapy Daily Progress Note    Subjective   Danay Gonzales reports that she is feeling much better in her knees and she denies having any pain in her low back at this time. She feels some weakness in the low back.       Objective   See Exercise, Manual, and Modality Logs for complete treatment.       Assessment/Plan     Pt is making very good progress with therapy and she demonstrates improving strength and tolerance to activity. Will cont to progress as tolerated.     Progress per Plan of Care and Progress strengthening /stabilization /functional activity           Manual Therapy:         mins  49853;  Therapeutic Exercise:    10     mins  85569;     Neuromuscular Pankaj:    30    mins  22956;    Therapeutic Activity:     13     mins  62211;     Gait Training:           mins  09969;     Ultrasound:          mins  94332;    Electrical Stimulation:         mins  65218 ( );  E-Stim Attended:         mins  65205  Iontophoresis          mins 05070   Traction          mins  10896  Fluidotherapy          mins  67987  Dry Needling          mins self-pay - No Charge  Paraffin          mins  88351    Timed Treatment:   53   mins   Total Treatment:     53   mins    Justus Schaeffer, PT, DPT, OCS, Cert. DN  Physical Therapist

## 2021-05-03 NOTE — PROGRESS NOTES
Physical Therapy Daily Progress Note    Subjective   Danay Gonzales reports that she is feeling very good and denies having any pain in the low back at this time.       Objective   See Exercise, Manual, and Modality Logs for complete treatment.       Assessment/Plan    Pt continues to progress very well and demonstrates an improvement in her strength and tolerance to activity. Will cont to progress as tolerated.     Progress per Plan of Care and Progress strengthening /stabilization /functional activity           Manual Therapy:         mins  49561;  Therapeutic Exercise:    15     mins  51187;     Neuromuscular Pankaj:    30    mins  13973;    Therapeutic Activity:     12     mins  13616;     Gait Training:           mins  34739;     Ultrasound:          mins  32838;    Electrical Stimulation:         mins  63004 ( );  E-Stim Attended:         mins  45508  Iontophoresis          mins 05379   Traction          mins  15525  Fluidotherapy          mins  64412  Dry Needling          mins self-pay - No Charge  Paraffin          mins  51650    Timed Treatment:   57   mins   Total Treatment:     57   mins    Justus Schaeffer, PT, DPT, OCS, Cert. DN  Physical Therapist

## 2021-05-04 DIAGNOSIS — Z98.890 S/P LUMBAR LAMINECTOMY: Primary | ICD-10-CM

## 2021-06-21 ENCOUNTER — OFFICE VISIT (OUTPATIENT)
Dept: ORTHOPEDIC SURGERY | Facility: CLINIC | Age: 81
End: 2021-06-21

## 2021-06-21 VITALS
BODY MASS INDEX: 22.38 KG/M2 | HEIGHT: 59 IN | DIASTOLIC BLOOD PRESSURE: 60 MMHG | SYSTOLIC BLOOD PRESSURE: 166 MMHG | HEART RATE: 79 BPM | WEIGHT: 111 LBS

## 2021-06-21 DIAGNOSIS — M17.11 PRIMARY OSTEOARTHRITIS OF RIGHT KNEE: Primary | ICD-10-CM

## 2021-06-21 PROCEDURE — 20610 DRAIN/INJ JOINT/BURSA W/O US: CPT | Performed by: ORTHOPAEDIC SURGERY

## 2021-06-21 RX ADMIN — TRIAMCINOLONE ACETONIDE 40 MG: 40 INJECTION, SUSPENSION INTRA-ARTICULAR; INTRAMUSCULAR at 15:47

## 2021-06-21 RX ADMIN — ROPIVACAINE HYDROCHLORIDE 4 ML: 5 INJECTION, SOLUTION EPIDURAL; INFILTRATION; PERINEURAL at 15:47

## 2021-06-21 NOTE — PROGRESS NOTES
Procedure   Large Joint Arthrocentesis: R knee  Date/Time: 6/21/2021 3:47 PM  Consent given by: patient  Site marked: site marked  Timeout: Immediately prior to procedure a time out was called to verify the correct patient, procedure, equipment, support staff and site/side marked as required   Supporting Documentation  Indications: pain   Procedure Details  Location: knee - R knee  Preparation: Patient was prepped and draped in the usual sterile fashion  Needle size: 23 G  Approach: anterolateral  Medications administered: 4 mL ropivacaine 0.5 %; 40 mg triamcinolone acetonide 40 MG/ML  Patient tolerance: patient tolerated the procedure well with no immediate complications

## 2021-06-21 NOTE — PROGRESS NOTES
[facility] [place] [facility] [place]    Subjective     Chief Complaint   Patient presents with   • Follow-up     3 month recheck - Primary osteoarthritis of right knee        HPI    Danay Gonzales is a 80 y.o. female who follows up for right knee arthritis. We performed an injection on her last visit on 03/02/2021. She is here today for routine follow-up.    The patient reports that the injection helped for about 3 months, but her pain is returning over the past week and a half. Her knee has been bothering her for several years, and she rates it as a 6 out of 10. She has some popping and giving way, her pain is exacerbated with standing and climbing stairs. She states that the pain is not any worse than it was at her last visit, but it is back to baseline.    The patient reports that she underwent spine surgery in 11/2020, which has been very helpful for her.    I have reviewed the following portions of the patient's history and agree with: History of Present Illness and Review of Systems    Patient Active Problem List   Diagnosis   • PVD (peripheral vascular disease) (CMS/HCC)   • Lumbar stenosis with neurogenic claudication   • Spondylosis of lumbar region without myelopathy or radiculopathy   • Sacroiliac joint dysfunction of left side   • Gait disturbance   • At high risk for falls   • Degeneration of lumbar or lumbosacral intervertebral disc   • Anemia with chronic illness     Past Medical History:   Diagnosis Date   • Anemia    • Arthritis    • Cancer (CMS/HCC)     skin   • Chronic constipation    • Diabetes mellitus (CMS/HCC)     borderline - no medications   • Disease of thyroid gland    • GERD (gastroesophageal reflux disease)    • Glaucoma    • Headache    • Hearing aid worn    • History of bronchitis    • History of migraine    • History of pneumonia    • Hypertension    • Kidney disease    • Low back pain    • Lumbar stenosis    • Menopause    • Mumps    • Osteoporosis    • PVD (peripheral vascular  disease) (CMS/HCC)    • Stroke (CMS/HCC)     right hand weakness    • Vertigo    • Wears glasses       Past Surgical History:   Procedure Laterality Date   • BREAST BIOPSY Bilateral     BOTH BREASTS US BX, PT DOES NOT REMEMBER WHEN   • CATARACT EXTRACTION, BILATERAL     • COLONOSCOPY  10/2016   • HYSTERECTOMY  1981    Complete   • LUMBAR LAMINECTOMY DISCECTOMY DECOMPRESSION N/A 11/24/2020    Procedure: LUMBAR LAMINECTOMY L3- S1;  Surgeon: Jae Graves MD;  Location: [facility];  Service: Neurosurgery;  Laterality: N/A;   • OOPHORECTOMY      Comnplete Hysterectomy   • OTHER SURGICAL HISTORY  1999    Scalp   • SKIN CANCER EXCISION Left 2005    Hand   • TONSILLECTOMY  1944   • UPPER GASTROINTESTINAL ENDOSCOPY  10/2016    Esophageal Stretching      Family History   Problem Relation Age of Onset   • Heart disease Mother    • Heart attack Mother    • Stroke Mother    • Lung disease Father    • Heart disease Sister    • Cancer Sister    • Heart disease Brother    • Stroke Brother    • Cancer Sister    • Cancer Brother    • Stroke Brother    • Heart disease Brother    • Heart disease Brother    • Lung disease Sister    • COPD Sister    • Emphysema Sister    • Breast cancer Neg Hx    • Ovarian cancer Neg Hx      Social History     Socioeconomic History   • Marital status:      Spouse name: Not on file   • Number of children: Not on file   • Years of education: Not on file   • Highest education level: Not on file   Tobacco Use   • Smoking status: Never Smoker   • Smokeless tobacco: Never Used   Vaping Use   • Vaping Use: Never used   Substance and Sexual Activity   • Alcohol use: No   • Drug use: No   • Sexual activity: Defer      Current Outpatient Medications on File Prior to Visit   Medication Sig Dispense Refill   • acetaminophen (TYLENOL) 500 MG tablet Take 500 mg by mouth Every 6 (Six) Hours As Needed.     • aspirin  MG tablet Take 325 mg by mouth Daily. Patient states Dr. Graves told her not to stop  prior to surgery     • calcitriol (ROCALTROL) 0.25 MCG capsule Take 0.25 mcg by mouth As Needed.     • calcium carbonate (OS-YASMANY) 600 MG tablet Take 600 mg by mouth Daily.     • Cholecalciferol (VITAMIN D3) 125 MCG (5000 UT) capsule capsule Take 5,000 Units by mouth Daily.     • Dexilant 60 MG capsule Take 1 capsule by mouth As Needed.     • gabapentin (NEURONTIN) 100 MG capsule Take 1 capsule by mouth Every Night. 30 capsule 0   • icosapent ethyl (Vascepa) 1 g capsule capsule Take 2 g by mouth 2 (Two) Times a Day With Meals. 120 capsule 5   • levothyroxine (SYNTHROID, LEVOTHROID) 75 MCG tablet Take 75 mcg by mouth Daily.     • Loperamide HCl (IMODIUM A-D PO) Take 1 tablet by mouth As Needed.     • magnesium gluconate (MAGONATE) 500 MG tablet Take 500 mg by mouth Daily.     • Multiple Vitamin (MULTI-VITAMIN DAILY PO) Take 1 tablet by mouth Daily.     • omeprazole (priLOSEC) 40 MG capsule Take 40 mg by mouth Daily.     • ondansetron (ZOFRAN) 4 MG tablet Take 4 mg by mouth Every 8 (Eight) Hours As Needed for Nausea or Vomiting.     • potassium phosphate, monobasic, (K-PHOS) 500 MG tablet Take 500 mg by mouth Daily.     • rosuvastatin (CRESTOR) 40 MG tablet Take 40 mg by mouth Daily.     • triamterene-hydrochlorothiazide (DYAZIDE) 37.5-25 MG per capsule Take 1 capsule by mouth Every Morning.     • vitamin B-12 (CYANOCOBALAMIN) 1000 MCG tablet Take 1,000 mcg by mouth Daily.       No current facility-administered medications on file prior to visit.      Allergies   Allergen Reactions   • Erythromycin Rash        Review of Systems   Constitutional: Negative for activity change, appetite change, chills, diaphoresis, fatigue, fever and unexpected weight change.   HENT: Negative for congestion, dental problem, drooling, ear discharge, ear pain, facial swelling, hearing loss, mouth sores, nosebleeds, postnasal drip, rhinorrhea, sinus pressure, sneezing, sore throat, tinnitus, trouble swallowing and voice change.    Eyes:  "Negative for photophobia, pain, discharge, redness, itching and visual disturbance.   Respiratory: Negative for apnea, cough, choking, chest tightness, shortness of breath, wheezing and stridor.    Cardiovascular: Negative for chest pain, palpitations and leg swelling.   Gastrointestinal: Negative for abdominal distention, abdominal pain, anal bleeding, blood in stool, constipation, diarrhea, nausea, rectal pain and vomiting.   Endocrine: Negative for cold intolerance, heat intolerance, polydipsia, polyphagia and polyuria.   Genitourinary: Negative for decreased urine volume, difficulty urinating, dysuria, enuresis, flank pain, frequency, genital sores, hematuria and urgency.   Musculoskeletal: Positive for arthralgias. Negative for back pain, gait problem, joint swelling, myalgias, neck pain and neck stiffness.   Skin: Negative for color change, pallor, rash and wound.   Allergic/Immunologic: Negative for environmental allergies, food allergies and immunocompromised state.   Neurological: Negative for dizziness, tremors, seizures, syncope, facial asymmetry, speech difficulty, weakness, light-headedness, numbness and headaches.   Hematological: Negative for adenopathy. Does not bruise/bleed easily.   Psychiatric/Behavioral: Negative for agitation, behavioral problems, confusion, decreased concentration, dysphoric mood, hallucinations, self-injury, sleep disturbance and suicidal ideas. The patient is not nervous/anxious and is not hyperactive.         Objective      Physical Exam  /60   Pulse 79   Ht 149.9 cm (59.02\")   Wt 50.3 kg (111 lb)   BMI 22.41 kg/m²     Body mass index is 22.41 kg/m².    General:   Mental Status:  Alert   Appearance: Cooperative, in no acute distress   Build and Nutrition: Well-nourished well-developed female   Orientation: Alert and oriented to person, place and time   Posture: Normal   Gait: Mildly antalgic on the right    Integument  • Right knee: No skin lesions, rash, or " ecchymosis.    Lower Extremities  • Right Knee:  • Tenderness: Some medial joint line tenderness  • Swelling: None  • Effusion: Trace  • Crepitus: Positive  • Atrophy: None  • Range of motion:  • Extension: 0°  • Flexion: 130°  • Instability: No varus or valgus laxity. Negative anterior drawer.  • Deformities: None    Imaging/Studies  Imaging Results (Last 24 Hours)     ** No results found for the last 24 hours. **            Assessment and Plan     Diagnoses and all orders for this visit:    1. Primary osteoarthritis of right knee (Primary)  -     Large Joint Arthrocentesis: R knee        1. Primary osteoarthritis of right knee        We discussed treatment options for her right knee pain, including a repeat cortisone injection, viscosupplementation injections, or a right total knee arthroplasty. The patient would like to proceed with a repeat cortisone injection today, as this has worked well for her previously.    Procedure Note:  The potential benefits of performing a therapeutic right knee joint injection, as well as potential risks (including, but not limited to infection, swelling, pain, bleeding, bruising, nerve/blood vessel damage, skin color changes, transient elevation in blood glucose levels, and fat atrophy) were discussed with the patient. After informed consent was obtained, a timeout procedure was performed, and the skin on the right knee was prepped with chlorhexidine soap and alcohol, after which ethyl chloride was applied to the skin at the injection site. Via the anterolateral approach, 1 ml of Kenalog 40 mg/ml mixed with 4 ml 0.5% ropivacaine plain was injected into the knee joint. The patient tolerated the procedure well, experiencing 100% relief a few minutes following the injection. There were no complications. A Band-Aid was applied to the injection site. Post-procedural instructions were given to the patient and/or their caregiver.    Return in about 4 months (around  10/21/2021).      Scribed for Jhony Griffin MD by Jud Cobian.  06/21/21   17:37 EDT    I have personally performed the services described in this document as scribed by the above individual, and it is both accurate and complete.  Jhony Griffin MD  6/22/2021  05:16 EDT

## 2021-06-22 RX ORDER — ROPIVACAINE HYDROCHLORIDE 5 MG/ML
4 INJECTION, SOLUTION EPIDURAL; INFILTRATION; PERINEURAL
Status: COMPLETED | OUTPATIENT
Start: 2021-06-21 | End: 2021-06-21

## 2021-06-22 RX ORDER — TRIAMCINOLONE ACETONIDE 40 MG/ML
40 INJECTION, SUSPENSION INTRA-ARTICULAR; INTRAMUSCULAR
Status: COMPLETED | OUTPATIENT
Start: 2021-06-21 | End: 2021-06-21

## 2021-09-15 ENCOUNTER — OFFICE VISIT (OUTPATIENT)
Dept: GASTROENTEROLOGY | Facility: CLINIC | Age: 81
End: 2021-09-15

## 2021-09-15 VITALS
BODY MASS INDEX: 22.46 KG/M2 | TEMPERATURE: 98 F | HEART RATE: 76 BPM | SYSTOLIC BLOOD PRESSURE: 116 MMHG | WEIGHT: 111.4 LBS | DIASTOLIC BLOOD PRESSURE: 64 MMHG | HEIGHT: 59 IN | OXYGEN SATURATION: 99 %

## 2021-09-15 DIAGNOSIS — R10.12 LUQ PAIN: ICD-10-CM

## 2021-09-15 DIAGNOSIS — K22.4 ESOPHAGEAL DYSKINESIA: Primary | ICD-10-CM

## 2021-09-15 DIAGNOSIS — K21.9 GASTROESOPHAGEAL REFLUX DISEASE, UNSPECIFIED WHETHER ESOPHAGITIS PRESENT: ICD-10-CM

## 2021-09-15 DIAGNOSIS — R19.4 CHANGE IN BOWEL HABITS: ICD-10-CM

## 2021-09-15 DIAGNOSIS — R19.7 DIARRHEA, UNSPECIFIED TYPE: ICD-10-CM

## 2021-09-15 PROCEDURE — 99214 OFFICE O/P EST MOD 30 MIN: CPT | Performed by: NURSE PRACTITIONER

## 2021-09-15 RX ORDER — ESOMEPRAZOLE MAGNESIUM 40 MG/1
40 CAPSULE, DELAYED RELEASE ORAL DAILY
Qty: 90 CAPSULE | Refills: 2 | Status: SHIPPED | OUTPATIENT
Start: 2021-09-15 | End: 2022-01-03

## 2021-09-15 RX ORDER — METHYLPREDNISOLONE 4 MG/1
TABLET ORAL
COMMUNITY
Start: 2021-07-19 | End: 2022-08-18

## 2021-09-15 RX ORDER — ESOMEPRAZOLE MAGNESIUM 40 MG/1
40 CAPSULE, DELAYED RELEASE ORAL DAILY
Qty: 30 CAPSULE | Refills: 5 | Status: SHIPPED | OUTPATIENT
Start: 2021-09-15 | End: 2021-09-15 | Stop reason: SDUPTHER

## 2021-09-15 NOTE — PROGRESS NOTES
"     New Patient Consultation     Patient Name: Danay Gonzales  : 1940   MRN: [id number]     Chief Complaint:    Chief Complaint   Patient presents with   • Difficulty Swallowing   • Abdominal Pain     Upper Abdominal Pain       History of Present Illness: Danay Gonzales is a 81 y.o. female who is here today for a Gastroenterology Consultation for LUQ pain.    Danay reports every morning she has a \"bout\" of diarrhea.  This is new within the past 5-6 months.  She has been experiencing waves of nausea, and vomiting.  She is only able to tolerate one meal a day. Later in the day she feels like the food \"just sits in her stomach\" and eventually refluxes back up. Food and drink \"go down\" without trouble.  Symptoms are worse with meat. History of esophageal dyskinesia.  Previously on prilosec and levsin.  Had stopped both of these medicines.  She restarted the levsin a few weeks ago and \" food is staying down\". She has had these symptoms before and was dilated in the past. She has had a 45-50 lb weight loss in the past 5 years.  Mostly unintentional.   She also reports new left upper quadrant pain.  It is tender to palpation.  She does not notice whether this is worse with food or not.  There is no blood in her bowel movements.  Her abdominal surgical history includes hysterectomy  Subjective      Review of Systems:   Review of Systems   Constitutional: Positive for unexpected weight loss. Negative for appetite change.   HENT: Positive for trouble swallowing.    Gastrointestinal: Positive for abdominal pain, diarrhea, nausea, vomiting, GERD and indigestion. Negative for abdominal distention, anal bleeding, blood in stool, constipation and rectal pain.       Past Medical History:   Past Medical History:   Diagnosis Date   • Anemia    • Arthritis    • Cancer (CMS/HCC)     skin   • Chronic constipation    • Diabetes mellitus (CMS/HCC)     borderline - no medications   • Disease of thyroid gland    • GERD " (gastroesophageal reflux disease)    • Glaucoma    • Headache    • Hearing aid worn    • History of bronchitis    • History of migraine    • History of pneumonia    • Hypertension    • Kidney disease    • Low back pain    • Lumbar stenosis    • Menopause    • Mumps    • Osteoporosis    • PVD (peripheral vascular disease) (CMS/HCC)    • Stroke (CMS/HCC)     right hand weakness    • Vertigo    • Wears glasses        Past Surgical History:   Past Surgical History:   Procedure Laterality Date   • BREAST BIOPSY Bilateral     BOTH BREASTS US BX, PT DOES NOT REMEMBER WHEN   • CATARACT EXTRACTION, BILATERAL     • COLONOSCOPY  10/2016   • HYSTERECTOMY  1981    Complete   • LUMBAR LAMINECTOMY DISCECTOMY DECOMPRESSION N/A 11/24/2020    Procedure: LUMBAR LAMINECTOMY L3- S1;  Surgeon: Jae Graves MD;  Location: [facility] [place];  Service: Neurosurgery;  Laterality: N/A;   • OOPHORECTOMY      Comnplete Hysterectomy   • OTHER SURGICAL HISTORY  1999    Scalp   • SKIN CANCER EXCISION Left 2005    Hand   • TONSILLECTOMY  1944   • UPPER GASTROINTESTINAL ENDOSCOPY  10/2016    Esophageal Stretching       Family History:   Family History   Problem Relation Age of Onset   • Heart disease Mother    • Heart attack Mother    • Stroke Mother    • Lung disease Father    • Heart disease Sister    • Cancer Sister    • Heart disease Brother    • Stroke Brother    • Cancer Sister    • Cancer Brother    • Stroke Brother    • Heart disease Brother    • Heart disease Brother    • Lung disease Sister    • COPD Sister    • Emphysema Sister    • Breast cancer Neg Hx    • Ovarian cancer Neg Hx        Social History:   Social History     Socioeconomic History   • Marital status:      Spouse name: Not on file   • Number of children: Not on file   • Years of education: Not on file   • Highest education level: Not on file   Tobacco Use   • Smoking status: Never Smoker   • Smokeless tobacco: Never Used   Vaping Use   • Vaping Use: Never used   Substance  and Sexual Activity   • Alcohol use: No   • Drug use: No   • Sexual activity: Defer       Alcohol/Tobacco History:   Social History     Substance and Sexual Activity   Alcohol Use No     Social History     Tobacco Use   Smoking Status Never Smoker   Smokeless Tobacco Never Used       Medications:     Current Outpatient Medications:   •  acetaminophen (TYLENOL) 500 MG tablet, Take 500 mg by mouth Every 6 (Six) Hours As Needed., Disp: , Rfl:   •  aspirin  MG tablet, Take 325 mg by mouth Daily. Patient states Dr. Graves told her not to stop prior to surgery, Disp: , Rfl:   •  betamethasone valerate (VALISONE) 0.1 % ointment, , Disp: , Rfl:   •  calcitriol (ROCALTROL) 0.25 MCG capsule, Take 0.25 mcg by mouth As Needed., Disp: , Rfl:   •  calcium carbonate (OS-YASMANY) 600 MG tablet, Take 600 mg by mouth Daily., Disp: , Rfl:   •  Cholecalciferol (VITAMIN D3) 125 MCG (5000 UT) capsule capsule, Take 5,000 Units by mouth Daily., Disp: , Rfl:   •  esomeprazole (nexIUM) 40 MG capsule, Take 1 capsule by mouth Daily., Disp: 90 capsule, Rfl: 2  •  gabapentin (NEURONTIN) 100 MG capsule, Take 1 capsule by mouth Every Night., Disp: 30 capsule, Rfl: 0  •  hyoscyamine (LEVSIN) 0.125 MG SL tablet, , Disp: , Rfl:   •  icosapent ethyl (Vascepa) 1 g capsule capsule, Take 2 g by mouth 2 (Two) Times a Day With Meals., Disp: 120 capsule, Rfl: 5  •  levothyroxine (SYNTHROID, LEVOTHROID) 75 MCG tablet, Take 75 mcg by mouth Daily., Disp: , Rfl:   •  Loperamide HCl (IMODIUM A-D PO), Take 1 tablet by mouth As Needed., Disp: , Rfl:   •  magnesium gluconate (MAGONATE) 500 MG tablet, Take 500 mg by mouth Daily., Disp: , Rfl:   •  methylPREDNISolone (MEDROL) 4 MG dose pack, , Disp: , Rfl:   •  Multiple Vitamin (MULTI-VITAMIN DAILY PO), Take 1 tablet by mouth Daily., Disp: , Rfl:   •  ondansetron (ZOFRAN) 4 MG tablet, Take 4 mg by mouth Every 8 (Eight) Hours As Needed for Nausea or Vomiting., Disp: , Rfl:   •  potassium phosphate, monobasic,  "(K-PHOS) 500 MG tablet, Take 500 mg by mouth Daily., Disp: , Rfl:   •  rosuvastatin (CRESTOR) 40 MG tablet, Take 40 mg by mouth Daily., Disp: , Rfl:   •  triamterene-hydrochlorothiazide (DYAZIDE) 37.5-25 MG per capsule, Take 1 capsule by mouth Every Morning., Disp: , Rfl:   •  vitamin B-12 (CYANOCOBALAMIN) 1000 MCG tablet, Take 1,000 mcg by mouth Daily., Disp: , Rfl:     Allergies:   Allergies   Allergen Reactions   • Erythromycin Rash       Objective     Physical Exam:  Vital Signs:   Vitals:    09/15/21 1100   BP: 116/64   BP Location: Left arm   Patient Position: Sitting   Cuff Size: Adult   Pulse: 76   Temp: 98 °F (36.7 °C)   TempSrc: Temporal   SpO2: 99%   Weight: 50.5 kg (111 lb 6.4 oz)   Height: 149.9 cm (59.02\")     Body mass index is 22.49 kg/m².     Physical Exam  Vitals and nursing note reviewed.   Constitutional:       General: She is not in acute distress.     Appearance: She is well-developed. She is not diaphoretic.   Eyes:      General: No scleral icterus.     Extraocular Movements:      Right eye: No nystagmus.      Left eye: No nystagmus.      Conjunctiva/sclera: Conjunctivae normal.      Pupils: Pupils are equal, round, and reactive to light.   Neck:      Thyroid: No thyromegaly.   Cardiovascular:      Rate and Rhythm: Normal rate and regular rhythm.   Pulmonary:      Effort: Pulmonary effort is normal.      Breath sounds: Normal breath sounds.   Abdominal:      General: Bowel sounds are normal. There is no distension. There are no signs of injury.      Palpations: Abdomen is soft. There is no hepatomegaly or splenomegaly.      Tenderness: There is abdominal tenderness in the left upper quadrant. There is no guarding or rebound.      Hernia: No hernia is present.   Musculoskeletal:      Cervical back: Neck supple.      Right lower leg: No edema.      Left lower leg: No edema.   Skin:     General: Skin is warm and dry.      Capillary Refill: Capillary refill takes 2 to 3 seconds.      Coloration: " Skin is not jaundiced or pale.      Findings: No bruising or petechiae.      Nails: There is no clubbing.   Neurological:      Mental Status: She is alert and oriented to person, place, and time.   Psychiatric:         Behavior: Behavior normal.         Thought Content: Thought content normal.         Judgment: Judgment normal.         Assessment / Plan      Assessment/Plan:   Diagnoses and all orders for this visit:    1. Esophageal dyskinesia (Primary)  -     Ambulatory referral for Screening EGD  -     FL esophagram complete; Future    2. Gastroesophageal reflux disease, unspecified whether esophagitis present  -     Discontinue: esomeprazole (nexIUM) 40 MG capsule; Take 1 capsule by mouth Daily.  Dispense: 30 capsule; Refill: 5  -     Ambulatory referral for Screening EGD  -     FL esophagram complete; Future  -     esomeprazole (nexIUM) 40 MG capsule; Take 1 capsule by mouth Daily.  Dispense: 90 capsule; Refill: 2    3. LUQ pain  -     Discontinue: esomeprazole (nexIUM) 40 MG capsule; Take 1 capsule by mouth Daily.  Dispense: 30 capsule; Refill: 5  -     US abdomen limited; Future  -     esomeprazole (nexIUM) 40 MG capsule; Take 1 capsule by mouth Daily.  Dispense: 90 capsule; Refill: 2    4. Diarrhea, unspecified type  -     Ambulatory Referral For Screening Colonoscopy  -     US abdomen limited; Future    5. Change in bowel habits  -     Ambulatory Referral For Screening Colonoscopy    Recommend esophagram and EGD.  Start Nexium once daily.  Continue Levsin.  Recommend soft foods  Will get ultrasound to evaluate gallbladder  Recommend colonoscopy due to change in bowel habits and weight loss      Follow Up: Interval to be determined based on ultrasound findings  No follow-ups on file.    Plan of care reviewed with the patient at the conclusion of today's visit.  Education was provided regarding diagnosis, management, and any prescribed or recommended OTC medications.  Patient verbalized understanding of and  agreement with management plan.         REBECA Hebert  [facility]

## 2021-09-20 ENCOUNTER — APPOINTMENT (OUTPATIENT)
Dept: PREADMISSION TESTING | Facility: HOSPITAL | Age: 81
End: 2021-09-20

## 2021-09-20 LAB — SARS-COV-2 RNA PNL SPEC NAA+PROBE: NOT DETECTED

## 2021-09-20 PROCEDURE — U0004 COV-19 TEST NON-CDC HGH THRU: HCPCS

## 2021-09-20 PROCEDURE — C9803 HOPD COVID-19 SPEC COLLECT: HCPCS

## 2021-09-22 DIAGNOSIS — Z12.11 SCREENING FOR COLON CANCER: Primary | ICD-10-CM

## 2021-09-23 ENCOUNTER — TELEPHONE (OUTPATIENT)
Dept: GASTROENTEROLOGY | Facility: CLINIC | Age: 81
End: 2021-09-23

## 2021-09-23 ENCOUNTER — OUTSIDE FACILITY SERVICE (OUTPATIENT)
Dept: GASTROENTEROLOGY | Facility: CLINIC | Age: 81
End: 2021-09-23

## 2021-09-23 PROCEDURE — G0500 MOD SEDAT ENDO SERVICE >5YRS: HCPCS | Performed by: INTERNAL MEDICINE

## 2021-09-23 PROCEDURE — 43245 EGD DILATE STRICTURE: CPT | Performed by: INTERNAL MEDICINE

## 2021-09-23 PROCEDURE — 88305 TISSUE EXAM BY PATHOLOGIST: CPT | Performed by: INTERNAL MEDICINE

## 2021-09-23 PROCEDURE — 43239 EGD BIOPSY SINGLE/MULTIPLE: CPT | Performed by: INTERNAL MEDICINE

## 2021-09-23 NOTE — TELEPHONE ENCOUNTER
Cancelled the appointment and tried calling patient on both numbers but there was no answer on either. I was not able to lvm on home number. lvm on the cell number.

## 2021-09-23 NOTE — TELEPHONE ENCOUNTER
Jud, Dr. Hairston says we no longer need an esophagram on this patient as he was able to see what he needed with the EGD.  Can you cancel it?

## 2021-09-24 ENCOUNTER — TELEPHONE (OUTPATIENT)
Dept: GASTROENTEROLOGY | Facility: CLINIC | Age: 81
End: 2021-09-24

## 2021-09-24 ENCOUNTER — LAB REQUISITION (OUTPATIENT)
Dept: LAB | Facility: HOSPITAL | Age: 81
End: 2021-09-24

## 2021-09-24 DIAGNOSIS — K22.4 DYSKINESIA OF ESOPHAGUS: ICD-10-CM

## 2021-09-24 NOTE — TELEPHONE ENCOUNTER
Ms Christian called and stated that her clenpiq bowel kit is $67. She can't afford medication. Patient will stop by on Monday to  Clenpiq sample.

## 2021-09-27 ENCOUNTER — TELEPHONE (OUTPATIENT)
Dept: GASTROENTEROLOGY | Facility: CLINIC | Age: 81
End: 2021-09-27

## 2021-09-27 NOTE — TELEPHONE ENCOUNTER
----- Message from Grzegorz Hairston MD sent at 9/27/2021 12:47 PM EDT -----  Please let Danay know she does not have h. pylori

## 2021-09-28 ENCOUNTER — APPOINTMENT (OUTPATIENT)
Dept: PREADMISSION TESTING | Facility: HOSPITAL | Age: 81
End: 2021-09-28

## 2021-10-01 ENCOUNTER — APPOINTMENT (OUTPATIENT)
Dept: GENERAL RADIOLOGY | Facility: HOSPITAL | Age: 81
End: 2021-10-01

## 2021-10-01 ENCOUNTER — HOSPITAL ENCOUNTER (OUTPATIENT)
Dept: ULTRASOUND IMAGING | Facility: HOSPITAL | Age: 81
Discharge: HOME | End: 2021-10-01
Admitting: NURSE PRACTITIONER

## 2021-10-01 DIAGNOSIS — R19.7 DIARRHEA, UNSPECIFIED TYPE: ICD-10-CM

## 2021-10-01 DIAGNOSIS — R10.12 LUQ PAIN: ICD-10-CM

## 2021-10-01 PROCEDURE — 76705 ECHO EXAM OF ABDOMEN: CPT

## 2021-10-04 ENCOUNTER — OUTSIDE FACILITY SERVICE (OUTPATIENT)
Dept: GASTROENTEROLOGY | Facility: CLINIC | Age: 81
End: 2021-10-04

## 2021-10-04 PROCEDURE — 88305 TISSUE EXAM BY PATHOLOGIST: CPT | Performed by: INTERNAL MEDICINE

## 2021-10-04 PROCEDURE — 45380 COLONOSCOPY AND BIOPSY: CPT | Performed by: INTERNAL MEDICINE

## 2021-10-04 PROCEDURE — G0500 MOD SEDAT ENDO SERVICE >5YRS: HCPCS | Performed by: INTERNAL MEDICINE

## 2021-10-05 ENCOUNTER — LAB REQUISITION (OUTPATIENT)
Dept: LAB | Facility: HOSPITAL | Age: 81
End: 2021-10-05

## 2021-10-05 DIAGNOSIS — R19.7 DIARRHEA, UNSPECIFIED: ICD-10-CM

## 2021-10-05 DIAGNOSIS — R19.4 CHANGE IN BOWEL HABIT: ICD-10-CM

## 2021-10-07 ENCOUNTER — TELEPHONE (OUTPATIENT)
Dept: GASTROENTEROLOGY | Facility: CLINIC | Age: 81
End: 2021-10-07

## 2021-10-07 NOTE — TELEPHONE ENCOUNTER
I SPOKE WITH MS CHAUDHARI. BIOPSY RESULTS GIVEN. I WILL MAIL ULTRASOUND TO PATIENT. SHE IS GOING TO FOLLOW WITH HER NEPHROLOGIST.

## 2021-10-07 NOTE — TELEPHONE ENCOUNTER
----- Message from Grzegorz Hairston MD sent at 10/6/2021 11:19 AM EDT -----  Please let Danay know she does not have microscopic colitis

## 2021-10-15 ENCOUNTER — TRANSCRIBE ORDERS (OUTPATIENT)
Dept: ADMINISTRATIVE | Facility: HOSPITAL | Age: 81
End: 2021-10-15

## 2021-10-15 DIAGNOSIS — Z12.31 VISIT FOR SCREENING MAMMOGRAM: Primary | ICD-10-CM

## 2021-10-25 ENCOUNTER — OFFICE VISIT (OUTPATIENT)
Dept: ORTHOPEDIC SURGERY | Facility: CLINIC | Age: 81
End: 2021-10-25

## 2021-10-25 VITALS
HEIGHT: 59 IN | WEIGHT: 106.6 LBS | HEART RATE: 82 BPM | SYSTOLIC BLOOD PRESSURE: 146 MMHG | BODY MASS INDEX: 21.49 KG/M2 | DIASTOLIC BLOOD PRESSURE: 61 MMHG

## 2021-10-25 DIAGNOSIS — M17.11 PRIMARY OSTEOARTHRITIS OF RIGHT KNEE: Primary | ICD-10-CM

## 2021-10-25 PROCEDURE — 99212 OFFICE O/P EST SF 10 MIN: CPT | Performed by: ORTHOPAEDIC SURGERY

## 2021-10-25 NOTE — PROGRESS NOTES
[facility] [place] [facility] Note    Subjective     Chief Complaint   Patient presents with   • Follow-up     4 month f/u--Primary osteoarthritis of right knee        HPI    It has been 4  month(s) since Ms. Gonzales's last visit. She returns to clinic today for follow-up of right knee arthritis. The issue has been ongoing for 5 year(s). She rates her pain a 0/10 on the pain scale. Previous/current treatments: bracing and NSAIDS. Current symptoms: pain, swelling, popping, grinding, stiffness and giving way/buckling. The pain is worse with any movement of the joint; elevating the extremity improve the pain. Overall, she is doing better.  The injection helped on her last visit.    I have reviewed the following portions of the patient's history and agree with: History of Present Illness and Review of Systems    Patient Active Problem List   Diagnosis   • PVD (peripheral vascular disease) ([facility])   • Lumbar stenosis with neurogenic claudication   • Spondylosis of lumbar region without myelopathy or radiculopathy   • Sacroiliac joint dysfunction of left side   • Gait disturbance   • At high risk for falls   • Degeneration of lumbar or lumbosacral intervertebral disc   • Anemia with chronic illness     Past Medical History:   Diagnosis Date   • Anemia    • Arthritis    • Cancer (HCC)     skin   • Chronic constipation    • Diabetes mellitus (HCC)     borderline - no medications   • Disease of thyroid gland    • GERD (gastroesophageal reflux disease)    • Glaucoma    • Headache    • Hearing aid worn    • History of bronchitis    • History of migraine    • History of pneumonia    • Hypertension    • Kidney disease    • Low back pain    • Lumbar stenosis    • Menopause    • Mumps    • Osteoporosis    • PVD (peripheral vascular disease) (HCC)    • Stroke (HCC)     right hand weakness    • Vertigo    • Wears glasses       Past Surgical History:   Procedure Laterality Date   • BREAST BIOPSY Bilateral     BOTH BREASTS US BX, PT  DOES NOT REMEMBER WHEN   • CATARACT EXTRACTION, BILATERAL     • COLONOSCOPY  10/2016   • HYSTERECTOMY  1981    Complete   • LUMBAR LAMINECTOMY DISCECTOMY DECOMPRESSION N/A 11/24/2020    Procedure: LUMBAR LAMINECTOMY L3- S1;  Surgeon: Jae Graves MD;  Location: [facility];  Service: Neurosurgery;  Laterality: N/A;   • OOPHORECTOMY      Comnplete Hysterectomy   • OTHER SURGICAL HISTORY  1999    Scalp   • SKIN CANCER EXCISION Left 2005    Hand   • TONSILLECTOMY  1944   • UPPER GASTROINTESTINAL ENDOSCOPY  10/2016    Esophageal Stretching      Family History   Problem Relation Age of Onset   • Heart disease Mother    • Heart attack Mother    • Stroke Mother    • Lung disease Father    • Heart disease Sister    • Cancer Sister    • Heart disease Brother    • Stroke Brother    • Cancer Sister    • Cancer Brother    • Stroke Brother    • Heart disease Brother    • Heart disease Brother    • Lung disease Sister    • COPD Sister    • Emphysema Sister    • Breast cancer Neg Hx    • Ovarian cancer Neg Hx      Social History     Socioeconomic History   • Marital status:    Tobacco Use   • Smoking status: Never Smoker   • Smokeless tobacco: Never Used   Vaping Use   • Vaping Use: Never used   Substance and Sexual Activity   • Alcohol use: No   • Drug use: No   • Sexual activity: Defer      Current Outpatient Medications on File Prior to Visit   Medication Sig Dispense Refill   • acetaminophen (TYLENOL) 500 MG tablet Take 500 mg by mouth Every 6 (Six) Hours As Needed.     • aspirin  MG tablet Take 325 mg by mouth Daily. Patient states Dr. Graves told her not to stop prior to surgery     • betamethasone valerate (VALISONE) 0.1 % ointment      • calcitriol (ROCALTROL) 0.25 MCG capsule Take 0.25 mcg by mouth As Needed.     • calcium carbonate (OS-YASMANY) 600 MG tablet Take 600 mg by mouth Daily.     • Cholecalciferol (VITAMIN D3) 125 MCG (5000 UT) capsule capsule Take 5,000 Units by mouth Daily.     • esomeprazole  (nexIUM) 40 MG capsule Take 1 capsule by mouth Daily. 90 capsule 2   • gabapentin (NEURONTIN) 100 MG capsule Take 1 capsule by mouth Every Night. 30 capsule 0   • hyoscyamine (LEVSIN) 0.125 MG SL tablet      • icosapent ethyl (Vascepa) 1 g capsule capsule Take 2 g by mouth 2 (Two) Times a Day With Meals. 120 capsule 5   • levothyroxine (SYNTHROID, LEVOTHROID) 75 MCG tablet Take 75 mcg by mouth Daily.     • Loperamide HCl (IMODIUM A-D PO) Take 1 tablet by mouth As Needed.     • magnesium gluconate (MAGONATE) 500 MG tablet Take 500 mg by mouth Daily.     • methylPREDNISolone (MEDROL) 4 MG dose pack      • Multiple Vitamin (MULTI-VITAMIN DAILY PO) Take 1 tablet by mouth Daily.     • ondansetron (ZOFRAN) 4 MG tablet Take 4 mg by mouth Every 8 (Eight) Hours As Needed for Nausea or Vomiting.     • potassium phosphate, monobasic, (K-PHOS) 500 MG tablet Take 500 mg by mouth Daily.     • rosuvastatin (CRESTOR) 40 MG tablet Take 40 mg by mouth Daily.     • Sod Picosulfate-Mag Ox-Cit Acd 10-3.5-12 MG-GM -GM/160ML solution Take 1 kit by mouth Take As Directed. Follow instructions mailed to your home. If you did not receive instructions please call [phone]. 320 mL 0   • triamterene-hydrochlorothiazide (DYAZIDE) 37.5-25 MG per capsule Take 1 capsule by mouth Every Morning.     • vitamin B-12 (CYANOCOBALAMIN) 1000 MCG tablet Take 1,000 mcg by mouth Daily.       No current facility-administered medications on file prior to visit.      Allergies   Allergen Reactions   • Erythromycin Rash        Review of Systems   Constitutional: Negative for activity change, appetite change, chills, diaphoresis, fatigue, fever and unexpected weight change.   HENT: Negative for congestion, dental problem, drooling, ear discharge, ear pain, facial swelling, hearing loss, mouth sores, nosebleeds, postnasal drip, rhinorrhea, sinus pressure, sneezing, sore throat, tinnitus, trouble swallowing and voice change.    Eyes: Negative for photophobia,  "pain, discharge, redness, itching and visual disturbance.   Respiratory: Negative for apnea, cough, choking, chest tightness, shortness of breath, wheezing and stridor.    Cardiovascular: Negative for chest pain, palpitations and leg swelling.   Gastrointestinal: Negative for abdominal distention, abdominal pain, anal bleeding, blood in stool, constipation, diarrhea, nausea, rectal pain and vomiting.   Endocrine: Negative for cold intolerance, heat intolerance, polydipsia, polyphagia and polyuria.   Genitourinary: Negative for decreased urine volume, difficulty urinating, dysuria, enuresis, flank pain, frequency, genital sores, hematuria and urgency.   Musculoskeletal: Positive for arthralgias. Negative for back pain, gait problem, joint swelling, myalgias, neck pain and neck stiffness.   Skin: Negative for color change, pallor, rash and wound.   Allergic/Immunologic: Negative for environmental allergies, food allergies and immunocompromised state.   Neurological: Negative for dizziness, tremors, seizures, syncope, facial asymmetry, speech difficulty, weakness, light-headedness, numbness and headaches.   Hematological: Negative for adenopathy. Does not bruise/bleed easily.   Psychiatric/Behavioral: Negative for agitation, behavioral problems, confusion, decreased concentration, dysphoric mood, hallucinations, self-injury, sleep disturbance and suicidal ideas. The patient is not nervous/anxious and is not hyperactive.         Objective      Physical Exam  /61   Pulse 82   Ht 149.9 cm (59.02\")   Wt 48.4 kg (106 lb 9.6 oz)   BMI 21.52 kg/m²     Body mass index is 21.52 kg/m².    General:   Mental Status:  Alert   Appearance: Cooperative, in no acute distress   Build and Nutrition: Well-nourished well-developed female   Orientation: Alert and oriented to person, place and time   Posture: Normal   Gait: Nonantalgic    Integument:   Right knee: No skin lesions, no rash, no ecchymosis    Lower " Extremities:   Right Knee:    Tenderness:  None    Effusion:  None    Swelling: None    Crepitus:  Positive    Range of motion:  Extension: 0°       Flexion: 120°  Instability:  No varus laxity, no valgus laxity, negative anterior drawer  Deformities:  None      Imaging/Studies    No new imaging today      Assessment and Plan     Diagnoses and all orders for this visit:    1. Primary osteoarthritis of right knee (Primary)        1. Primary osteoarthritis of right knee        I reviewed my findings with the patient today.  Her right knee is doing well today, with no pain.  I will see her back in 6 months, but sooner for any problems.  Repeat injection may be considered if she has pain between now and then.    No follow-ups on file.      Jhony Griffin MD  10/25/21  13:17 EDT

## 2021-11-22 ENCOUNTER — OFFICE VISIT (OUTPATIENT)
Dept: ORTHOPEDIC SURGERY | Facility: CLINIC | Age: 81
End: 2021-11-22

## 2021-11-22 VITALS
BODY MASS INDEX: 21.37 KG/M2 | SYSTOLIC BLOOD PRESSURE: 139 MMHG | HEART RATE: 81 BPM | HEIGHT: 59 IN | DIASTOLIC BLOOD PRESSURE: 51 MMHG | WEIGHT: 106 LBS

## 2021-11-22 DIAGNOSIS — M16.12 PRIMARY OSTEOARTHRITIS OF LEFT HIP: ICD-10-CM

## 2021-11-22 DIAGNOSIS — M17.12 PRIMARY OSTEOARTHRITIS OF LEFT KNEE: Primary | ICD-10-CM

## 2021-11-22 PROCEDURE — 20610 DRAIN/INJ JOINT/BURSA W/O US: CPT | Performed by: PHYSICIAN ASSISTANT

## 2021-11-22 PROCEDURE — 99214 OFFICE O/P EST MOD 30 MIN: CPT | Performed by: PHYSICIAN ASSISTANT

## 2021-11-22 RX ADMIN — LIDOCAINE HYDROCHLORIDE 4 ML: 10 INJECTION, SOLUTION EPIDURAL; INFILTRATION; INTRACAUDAL; PERINEURAL at 11:31

## 2021-11-22 RX ADMIN — TRIAMCINOLONE ACETONIDE 40 MG: 40 INJECTION, SUSPENSION INTRA-ARTICULAR; INTRAMUSCULAR at 11:31

## 2021-11-22 NOTE — PROGRESS NOTES
Procedure   Large Joint Arthrocentesis: L knee  Date/Time: 11/22/2021 11:31 AM  Consent given by: patient  Site marked: site marked  Timeout: Immediately prior to procedure a time out was called to verify the correct patient, procedure, equipment, support staff and site/side marked as required   Supporting Documentation  Indications: pain   Procedure Details  Location: knee - L knee  Preparation: Patient was prepped and draped in the usual sterile fashion  Needle size: 22 G  Approach: anterolateral  Medications administered: 4 mL lidocaine PF 1% 1 %; 40 mg triamcinolone acetonide 40 MG/ML  Patient tolerance: patient tolerated the procedure well with no immediate complications

## 2021-11-22 NOTE — PROGRESS NOTES
[facility] [place] [facility] Note        Subjective     CC: Pain of the Left Knee      HPI    Danay Gonzales is a 81 y.o. female.  Patient presents today for evaluation of her left knee.  She is previously been seen by Dr. Griffin for her right knee.  She has known osteoarthritis to that knee.  She is undergone corticosteroid injection to the right knee and did well.  She is interested in symptoms similar to the left knee as it is now symptomatic.    Patient reports pain started about 2 weeks ago to the left knee.  She describes a hip issue about 25 years ago that was treated with chiropractor and massage which helped.  With regards to her knee she stepped out of bed felt a pop and sudden pain to the knee.  She did try some chiropractic therapy to it 3 times last week with no change.    Currently she endorses a pain scale of 9/10.  Severity the pain moderate to severe.  Quality pain throbbing.  Associated symptoms the pop x1 with resulting pain.  Walking, standing increase the pain.  She has been treating with ice, Tylenol as well as NSAIDs.  No reported numbness or tingling into the extremity.  No mechanical symptoms to the knee.      ROS:    Constiutional:Pt denies fever, chills, nausea, or vomiting.  MSK:as above        Objective      Past Medical History  Past Medical History:   Diagnosis Date   • Anemia    • Arthritis    • Cancer (HCC)     skin   • Chronic constipation    • Diabetes mellitus (HCC)     borderline - no medications   • Disease of thyroid gland    • GERD (gastroesophageal reflux disease)    • Glaucoma    • Headache    • Hearing aid worn    • History of bronchitis    • History of migraine    • History of pneumonia    • Hypertension    • Kidney disease    • Low back pain    • Lumbar stenosis    • Menopause    • Mumps    • Osteoporosis    • PVD (peripheral vascular disease) ([facility])    • Stroke ([facility])     right hand weakness    • Vertigo    • Wears glasses          Physical Exam  /51   Pulse  Situation: Routine call.    Key Assessments: Patient doing well, no health concerns at this time. Patient seems to be in good spirits and is taking walks outside.    Plan: Follow up call in three weeks.       See hyperlinks within encounter for full documentation   "81   Ht 149.9 cm (59.02\")   Wt 48.1 kg (106 lb)   BMI 21.40 kg/m²     Body mass index is 21.4 kg/m².    Patient is well nourished and well developed.        Ortho Exam  Left knee  Integument:   Left knee: No skin lesions, no rash, no ecchymosis    Neurologic:   Motor:    Left lower extremity: 5/5 quadriceps, hamstrings, ankle dorsiflexors, and ankle plantar flexors    Lower Extremities:   Left knee:    Tenderness:  Medial greater than lateral joint line tenderness.  Additionally patient also has josé luis-/retropatellar tenderness    Effusion:  Trace    Swelling:  None    Crepitus:  Positive    Atrophy:  None    Range of motion:  Extension: 0°       Flexion: 120°    Instability:  No varus laxity, no valgus laxity, negative anterior drawer    Deformities:  Genu varum    Left Hip  Integument:   Left hip: No skin lesions, no rash, no ecchymosis    Lower Extremity:   Left hip:    Tenderness:  None    Swelling:  None    Crepitus:  None    Atrophy:  None    Range of motion:  External Rotation: 30°       Internal Rotation: 30°       Flexion:  100°       Extension:  0°     Instability:  None    Deformities:  None     Functional testing: Negative Stinchfield     No leg length discrepancy      Imaging/Labs/EMG Reviewed:  Ordered left knee and hip plain films.  Imaging read/interpreted by Dr. Griffin.    Left Knee Radiographs  Indication: left knee pain  Views: Standing AP's and skiers of both knees, with lateral and sunrise views of the left knee     Comparison: AP view only from 3/1/2021     Findings:   Advanced arthritic changes, with near bone-on-bone contact in medial compartment, tricompartmental degeneration, no acute bony abnormalities.  Advanced patellofemoral arthritis.  No unusual bony features.    Left Hip Radiographs  Indication: left hip pain  Views: low AP pelvis and lateral of the left hip     Comparison: 7/6/2018     Findings:   Stable degenerative change as compared to the previous imaging, with no unusual bony " features.  No acute bony abnormalities.      Assessment:  1. Primary osteoarthritis of left knee    2. Primary osteoarthritis of left hip        Plan:  1. Primary osteoarthritis left knee--offered and accepted corticosteroid injection to the left knee.  Injection was given today.  2. Primary osteoarthritis left hip--stable in appearance.  Continue with observation for now.  3. Patient to continue use of Tylenol.  Additionally she has diclofenac and Aleve.  Patient instructed to use one of the other but not both for inflammation/pain control.  4. Follow-up in 4 weeks for repeat evaluation, sooner if issues arise or symptoms worsen/change.  5. Questions and concerns answered.    After discussing the risks, benefits, indications of injection, the patient gave consent to proceed.  Left knee was confirmed as the correct joint to be injected with a timeout.  It was then prepped using Hibiclens and injected with a mixture of 4 cc of 1% plain lidocaine and 1 cc of Kenalog (40 mg per mL), without any resistance through the anterior lateral approach, patient in seated position.  Area was cleaned, hemostasis was achieved and a Band-Aid was applied over the injection site.  The patient tolerated procedure well.  I instructed the patient on signs and symptoms of infection.  They should report to the emergency department or return to clinic if any of these develop, for further evaluation and treatment.  Recommended modifying activity for the next 48 hours to include rest, ice, elevation and oral pain medication as needed.        Briseida Mejia PA-C  11/29/21  13:37 EST      Dictated Utilizing Dragon Dictation.

## 2021-11-29 RX ORDER — LIDOCAINE HYDROCHLORIDE 10 MG/ML
4 INJECTION, SOLUTION EPIDURAL; INFILTRATION; INTRACAUDAL; PERINEURAL
Status: COMPLETED | OUTPATIENT
Start: 2021-11-22 | End: 2021-11-22

## 2021-11-29 RX ORDER — TRIAMCINOLONE ACETONIDE 40 MG/ML
40 INJECTION, SUSPENSION INTRA-ARTICULAR; INTRAMUSCULAR
Status: COMPLETED | OUTPATIENT
Start: 2021-11-22 | End: 2021-11-22

## 2021-12-14 ENCOUNTER — HOSPITAL ENCOUNTER (OUTPATIENT)
Dept: MAMMOGRAPHY | Facility: HOSPITAL | Age: 81
Discharge: HOME | End: 2021-12-14
Admitting: FAMILY MEDICINE

## 2021-12-14 DIAGNOSIS — Z12.31 VISIT FOR SCREENING MAMMOGRAM: ICD-10-CM

## 2021-12-14 PROCEDURE — 77067 SCR MAMMO BI INCL CAD: CPT | Performed by: RADIOLOGY

## 2021-12-14 PROCEDURE — 77063 BREAST TOMOSYNTHESIS BI: CPT

## 2021-12-14 PROCEDURE — 77067 SCR MAMMO BI INCL CAD: CPT

## 2021-12-14 PROCEDURE — 77063 BREAST TOMOSYNTHESIS BI: CPT | Performed by: RADIOLOGY

## 2022-01-03 ENCOUNTER — OFFICE VISIT (OUTPATIENT)
Dept: ORTHOPEDIC SURGERY | Facility: CLINIC | Age: 82
End: 2022-01-03

## 2022-01-03 VITALS
BODY MASS INDEX: 21.81 KG/M2 | WEIGHT: 108.2 LBS | DIASTOLIC BLOOD PRESSURE: 74 MMHG | SYSTOLIC BLOOD PRESSURE: 120 MMHG | HEIGHT: 59 IN

## 2022-01-03 DIAGNOSIS — M48.062 LUMBAR STENOSIS WITH NEUROGENIC CLAUDICATION: ICD-10-CM

## 2022-01-03 DIAGNOSIS — M17.12 PRIMARY OSTEOARTHRITIS OF LEFT KNEE: Primary | ICD-10-CM

## 2022-01-03 PROCEDURE — 99213 OFFICE O/P EST LOW 20 MIN: CPT | Performed by: PHYSICIAN ASSISTANT

## 2022-01-03 RX ORDER — TRIAMTERENE AND HYDROCHLOROTHIAZIDE 37.5; 25 MG/1; MG/1
TABLET ORAL
COMMUNITY
Start: 2021-12-13

## 2022-01-03 RX ORDER — OMEPRAZOLE 40 MG/1
CAPSULE, DELAYED RELEASE ORAL
COMMUNITY
Start: 2021-12-13

## 2022-01-03 NOTE — PROGRESS NOTES
"    [facility] [place] [facility]        Subjective     CC: Follow-up (6 week f/u Primary osteoarthritis of left knee; cortisone injection 11.22.2021)      SHANICE Gonzales is a 81 y.o. female.  Patient returns for follow-up evaluation of her left knee.  At her last appointment on 11/22/2021 she was provided a corticosteroid injection.  She states the injection helped with regards to the knee pain for about 4 weeks but she still notes numbness and tingling traveling from medial knee through anterior thigh into the left hip/buttock region.      Patient endorses a pain scale of 8/10.  She places her hand on her left low back/buttock region would localizing the pain.  She has a known history of lumbar spinal stenosis.  She states she is getting appointment to have this evaluated by Dr. Graves.    Walking, standing exacerbate symptoms.  She takes Tylenol and uses ice to help.      ROS:    Constiutional:Pt denies fever, chills, nausea, or vomiting.  MSK:as above        Objective      Past Medical History  Past Medical History:   Diagnosis Date   • Anemia    • Arthritis    • Cancer (HCC)     skin   • Chronic constipation    • Diabetes mellitus (HCC)     borderline - no medications   • Disease of thyroid gland    • GERD (gastroesophageal reflux disease)    • Glaucoma    • Headache    • Hearing aid worn    • History of bronchitis    • History of migraine    • History of pneumonia    • Hypertension    • Kidney disease    • Low back pain    • Lumbar stenosis    • Menopause    • Mumps    • Osteoporosis    • PVD (peripheral vascular disease) ([facility])    • Stroke ([facility])     right hand weakness    • Vertigo    • Wears glasses          Physical Exam  /74   Ht 149.9 cm (59.02\")   Wt 49.1 kg (108 lb 3.2 oz)   BMI 21.84 kg/m²     Body mass index is 21.84 kg/m².    Patient is well nourished and well developed.        Ortho Exam  Left knee/hip  Tenderness: No palpable tenderness to the knee/hip.  Her main complaint at " this time is the pain from medial knee to anterior thigh into her left low back/buttock region.  Motion: Patient grabs her ankle places her leg into a figure 4 position and denies pain when doing this to the hip or knee.  States her pain is in the left low back region.  Knee: 0-120 degrees with mild crepitus.  Hip: 100 degrees flexion, 30 ER, 30 IR  Testing: Varus and valgus stress test to the knee negative.  Stinchfield, ER/IR hip negative.  Motor/sensory: Grossly intact L2-S1 with subjective reported numbness medial knee into anterior thigh.      Imaging/Labs/EMG Reviewed:  No new imaging today.      Assessment:  1. Primary osteoarthritis of left knee    2. Lumbar stenosis with neurogenic claudication        Plan:  1. Primary osteoarthritis left knee--stable, no injections today.  2. Lumbar stenosis with neurogenic claudication--patient is in the process of scheduling an appointment Dr. Graves for further evaluation.  She states at this time she is uninterested in any further surgery to her back but we did talk about him possibly referring her to pain management for injections which she thinks she would be agreeable to.  3. May continue use of Tylenol as needed for pain control.  4. Follow-up in our clinic as needed.  5. Questions and concerns answered.      Briseida Mejia PA-C  01/04/22  08:52 EST      Dictated Utilizing Dragon Dictation.

## 2022-04-18 ENCOUNTER — OFFICE VISIT (OUTPATIENT)
Dept: ORTHOPEDIC SURGERY | Facility: CLINIC | Age: 82
End: 2022-04-18

## 2022-04-18 VITALS
BODY MASS INDEX: 21.57 KG/M2 | DIASTOLIC BLOOD PRESSURE: 72 MMHG | HEIGHT: 59 IN | SYSTOLIC BLOOD PRESSURE: 130 MMHG | WEIGHT: 107 LBS

## 2022-04-18 DIAGNOSIS — M17.11 PRIMARY OSTEOARTHRITIS OF RIGHT KNEE: Primary | ICD-10-CM

## 2022-04-18 PROCEDURE — 20610 DRAIN/INJ JOINT/BURSA W/O US: CPT | Performed by: ORTHOPAEDIC SURGERY

## 2022-04-18 PROCEDURE — 99214 OFFICE O/P EST MOD 30 MIN: CPT | Performed by: ORTHOPAEDIC SURGERY

## 2022-04-18 RX ORDER — ONDANSETRON HYDROCHLORIDE 8 MG/1
TABLET, FILM COATED ORAL
COMMUNITY
Start: 2022-04-01 | End: 2023-03-26 | Stop reason: HOSPADM

## 2022-04-18 RX ORDER — ROPIVACAINE HYDROCHLORIDE 5 MG/ML
4 INJECTION, SOLUTION EPIDURAL; INFILTRATION; PERINEURAL
Status: COMPLETED | OUTPATIENT
Start: 2022-04-18 | End: 2022-04-18

## 2022-04-18 RX ORDER — TRIAMCINOLONE ACETONIDE 40 MG/ML
40 INJECTION, SUSPENSION INTRA-ARTICULAR; INTRAMUSCULAR
Status: COMPLETED | OUTPATIENT
Start: 2022-04-18 | End: 2022-04-18

## 2022-04-18 RX ORDER — PROMETHAZINE HYDROCHLORIDE AND CODEINE PHOSPHATE 6.25; 1 MG/5ML; MG/5ML
SOLUTION ORAL
COMMUNITY
Start: 2022-04-08 | End: 2022-08-18

## 2022-04-18 RX ADMIN — TRIAMCINOLONE ACETONIDE 40 MG: 40 INJECTION, SUSPENSION INTRA-ARTICULAR; INTRAMUSCULAR at 14:49

## 2022-04-18 RX ADMIN — ROPIVACAINE HYDROCHLORIDE 4 ML: 5 INJECTION, SOLUTION EPIDURAL; INFILTRATION; PERINEURAL at 14:49

## 2022-04-18 NOTE — PROGRESS NOTES
[facility]opa[facility] Surgery Clinic Note    Subjective     Chief Complaint   Patient presents with   • Follow-up     6 month follow up -- Primary osteoarthritis of right knee         HPI    It has been 6  month(s) since Ms. Gonzales's last visit. She returns to clinic today for follow-up of right knee arthritis. The issue has been ongoing for 2 month(s). She rates her pain a 5/10 on the pain scale. Previous/current treatments: bracing and NSAIDS. Current symptoms: pain, popping and giving way/buckling. The pain is worse with walking, standing, climbing stairs, working and any movement of the joint; pain medication and/or NSAID improve the pain. Overall, she is doing worse.  Previous injection provided relief.  She is interested in nonoperative management.    I have reviewed the following portions of the patient's history and agree with: History of Present Illness and Review of Systems    Patient Active Problem List   Diagnosis   • PVD (peripheral vascular disease) ([facility])   • Lumbar stenosis with neurogenic claudication   • Spondylosis of lumbar region without myelopathy or radiculopathy   • Sacroiliac joint dysfunction of left side   • Gait disturbance   • At high risk for falls   • Degeneration of lumbar or lumbosacral intervertebral disc   • Anemia with chronic illness     Past Medical History:   Diagnosis Date   • Anemia    • Arthritis    • Cancer ([facility])     skin   • Chronic constipation    • Diabetes mellitus ([facility])     borderline - no medications   • Disease of thyroid gland    • GERD (gastroesophageal reflux disease)    • Glaucoma    • Headache    • Hearing aid worn    • History of bronchitis    • History of migraine    • History of pneumonia    • Hypertension    • Kidney disease    • Low back pain    • Lumbar stenosis    • Menopause    • Mumps    • Osteoporosis    • PVD (peripheral vascular disease) ([facility])    • Stroke ([facility])     right hand weakness    • Vertigo    • Wears glasses       Past Surgical History:    Procedure Laterality Date   • BREAST BIOPSY Bilateral     BOTH BREASTS US BX, PT DOES NOT REMEMBER WHEN   • CATARACT EXTRACTION, BILATERAL     • COLONOSCOPY  10/2016   • HYSTERECTOMY  1981    Complete   • LUMBAR LAMINECTOMY DISCECTOMY DECOMPRESSION N/A 11/24/2020    Procedure: LUMBAR LAMINECTOMY L3- S1;  Surgeon: Jae Graves MD;  Location: [facility];  Service: Neurosurgery;  Laterality: N/A;   • OOPHORECTOMY      Comnplete Hysterectomy   • OTHER SURGICAL HISTORY  1999    Scalp   • SKIN CANCER EXCISION Left 2005    Hand   • TONSILLECTOMY  1944   • UPPER GASTROINTESTINAL ENDOSCOPY  10/2016    Esophageal Stretching      Family History   Problem Relation Age of Onset   • Heart disease Mother    • Heart attack Mother    • Stroke Mother    • Lung disease Father    • Heart disease Sister    • Cancer Sister    • Heart disease Brother    • Stroke Brother    • Cancer Sister    • Cancer Brother    • Stroke Brother    • Heart disease Brother    • Heart disease Brother    • Lung disease Sister    • COPD Sister    • Emphysema Sister    • Breast cancer Neg Hx    • Ovarian cancer Neg Hx      Social History     Socioeconomic History   • Marital status:    Tobacco Use   • Smoking status: Never Smoker   • Smokeless tobacco: Never Used   Vaping Use   • Vaping Use: Never used   Substance and Sexual Activity   • Alcohol use: No   • Drug use: No   • Sexual activity: Defer      Current Outpatient Medications on File Prior to Visit   Medication Sig Dispense Refill   • acetaminophen (TYLENOL) 500 MG tablet Take 500 mg by mouth Every 6 (Six) Hours As Needed.     • aspirin  MG tablet Take 325 mg by mouth Daily. Patient states Dr. Graves told her not to stop prior to surgery     • betamethasone valerate (VALISONE) 0.1 % ointment      • calcitriol (ROCALTROL) 0.25 MCG capsule Take 0.25 mcg by mouth As Needed.     • calcium carbonate (OS-YASMANY) 600 MG tablet Take 600 mg by mouth Daily.     • Cholecalciferol (VITAMIN D3) 125 MCG  (5000 UT) capsule capsule Take 5,000 Units by mouth Daily.     • gabapentin (NEURONTIN) 100 MG capsule Take 1 capsule by mouth Every Night. 30 capsule 0   • hyoscyamine (LEVSIN) 0.125 MG SL tablet      • icosapent ethyl (Vascepa) 1 g capsule capsule Take 2 g by mouth 2 (Two) Times a Day With Meals. 120 capsule 5   • levothyroxine (SYNTHROID, LEVOTHROID) 75 MCG tablet Take 75 mcg by mouth Daily.     • Loperamide HCl (IMODIUM A-D PO) Take 1 tablet by mouth As Needed.     • magnesium gluconate (MAGONATE) 500 MG tablet Take 500 mg by mouth Daily.     • methylPREDNISolone (MEDROL) 4 MG dose pack      • Multiple Vitamin (MULTI-VITAMIN DAILY PO) Take 1 tablet by mouth Daily.     • omeprazole (priLOSEC) 40 MG capsule      • ondansetron (ZOFRAN) 4 MG tablet Take 4 mg by mouth Every 8 (Eight) Hours As Needed for Nausea or Vomiting.     • ondansetron (ZOFRAN) 8 MG tablet      • potassium phosphate, monobasic, (K-PHOS) 500 MG tablet Take 500 mg by mouth Daily.     • promethazine-codeine (PHENERGAN with CODEINE) 6.25-10 MG/5ML solution      • rosuvastatin (CRESTOR) 40 MG tablet Take 40 mg by mouth Daily.     • SALINE MIST 0.65 % nasal spray      • Sod Picosulfate-Mag Ox-Cit Acd 10-3.5-12 MG-GM -GM/160ML solution Take 1 kit by mouth Take As Directed. Follow instructions mailed to your home. If you did not receive instructions please call [phone]. 320 mL 0   • triamterene-hydrochlorothiazide (MAXZIDE-25) 37.5-25 MG per tablet      • vitamin B-12 (CYANOCOBALAMIN) 1000 MCG tablet Take 1,000 mcg by mouth Daily.       No current facility-administered medications on file prior to visit.      Allergies   Allergen Reactions   • Erythromycin Rash        Review of Systems   Constitutional: Negative for activity change, appetite change, chills, diaphoresis, fatigue, fever and unexpected weight change.   HENT: Negative for congestion, dental problem, drooling, ear discharge, ear pain, facial swelling, hearing loss, mouth sores,  "nosebleeds, postnasal drip, rhinorrhea, sinus pressure, sneezing, sore throat, tinnitus, trouble swallowing and voice change.    Eyes: Negative for photophobia, pain, discharge, redness, itching and visual disturbance.   Respiratory: Negative for apnea, cough, choking, chest tightness, shortness of breath, wheezing and stridor.    Cardiovascular: Negative for chest pain, palpitations and leg swelling.   Gastrointestinal: Negative for abdominal distention, abdominal pain, anal bleeding, blood in stool, constipation, diarrhea, nausea, rectal pain and vomiting.   Endocrine: Negative for cold intolerance, heat intolerance, polydipsia, polyphagia and polyuria.   Genitourinary: Negative for decreased urine volume, difficulty urinating, dysuria, enuresis, flank pain, frequency, genital sores, hematuria and urgency.   Musculoskeletal: Positive for arthralgias. Negative for back pain, gait problem, joint swelling, myalgias, neck pain and neck stiffness.   Skin: Negative for color change, pallor, rash and wound.   Allergic/Immunologic: Negative for environmental allergies, food allergies and immunocompromised state.   Neurological: Negative for dizziness, tremors, seizures, syncope, facial asymmetry, speech difficulty, weakness, light-headedness, numbness and headaches.   Hematological: Negative for adenopathy. Does not bruise/bleed easily.   Psychiatric/Behavioral: Negative for agitation, behavioral problems, confusion, decreased concentration, dysphoric mood, hallucinations, self-injury, sleep disturbance and suicidal ideas. The patient is not nervous/anxious and is not hyperactive.         Objective      Physical Exam  /72   Ht 149.9 cm (59.02\")   Wt 48.5 kg (107 lb)   BMI 21.60 kg/m²     Body mass index is 21.6 kg/m².    General:   Mental Status:  Alert   Appearance: Cooperative, in no acute distress   Build and Nutrition: Well-nourished well-developed female   Orientation: Alert and oriented to person, place and " [Calm] : calm time   Posture: Normal   Gait: Limp on the right    Integument:              Right knee: No skin lesions, no rash, no ecchymosis     Lower Extremities:              Right Knee:                          Tenderness:    Medial and lateral joint line tenderness                          Effusion:          None                          Swelling:          None                          Crepitus:          Positive                          Range of motion:        Extension:       0°                                                              Flexion:           125°  Instability:        No varus laxity, no valgus laxity, negative anterior drawer  Deformities:     None    Imaging/Studies  Imaging Results (Last 24 Hours)     Procedure Component Value Units Date/Time    XR Knee 4+ View Right [[id number]] Resulted: 04/18/22 1440     Updated: 04/18/22 1441    Narrative:      Right Knee Radiographs  Indication: right knee pain  Views: Standing AP's and skiers of both knees, with lateral and sunrise   views of the right knee    Comparison: 3/1/2021    Findings:   Fuhs osteopenia in both the distal femur and proximal tibia, with no   significant change compared to previous imaging, with medial joint space   narrowing, advanced patellofemoral arthritis with bone-on-bone contact,   and no acute bony abnormalities.  Stable overall compared to the previous   imaging.            Assessment and Plan     Diagnoses and all orders for this visit:    1. Primary osteoarthritis of right knee (Primary)  -     XR Knee 4+ View Right  -     - Large Joint Arthrocentesis: R knee        1. Primary osteoarthritis of right knee        I reviewed my findings with the patient.  Her right knee is flared up again to the point where she would like to have a repeat injection.  Injection was provided.  I will see her back in 4 months, but sooner for any problems.  She is not interested in surgical intervention.    Procedure Note:  The potential benefits of  [de-identified] : NAD, comfortable performing a therapeutic right knee joint injection, as well as potential risks (including, but not limited to infection, swelling, pain, bleeding, bruising, nerve/blood vessel damage, skin color changes, transient elevation in blood glucose levels, and fat atrophy) were discussed with the patient.  After informed consent, timeout procedure was performed, and the skin on the right knee was prepped with chlorhexidine soap and alcohol, after which ethyl chloride was applied to the skin at the injection site. Via the anterolateral approach, 1ml of Kenalog 40mg/ml mixed with 4ml 0.5% ropivacaine plain was injected into the knee joint.  The patient tolerated the procedure well, experiencing 75-80% improvement a few minutes following the injection. There were no complications.  Band-Aid was applied to the injection site. Post-procedural instructions were given to the patient and/or their caregiver.      Return in about 4 months (around 8/18/2022).      Jhony Griffin MD  04/18/22  15:09 EDT     [de-identified] : NCAT, no scleral icterus [de-identified] : Supple, no JVD or cervical lymphadenopathy [de-identified] : CTAB [de-identified] : S1S2 normal, RRR [de-identified] : +BS soft NT ND.  No hepatosplenomegaly.  Small umbilical hernia, reducible and non-tender.  Well-healed midline incision from prior laparotomy. [de-identified] : Reducible, non-tender left inguinal hernia.  Possible right inguinal hernia. [de-identified] : No clubbing, cyanosis, or edema. [de-identified] : Warm, dry.  The left upper arm has an approximately 5 cm soft, mobile, non-tender mass on the lateral aspect of the arm.  The right forearm has an approximately 2 cm soft, mobile, non-tender mass on the dorsal/medial side of the arm.  The posterior neck has an approximately 4 cm soft, mobile, non-tender mass, that has a possible central punctum appreciated. [de-identified] : A&Ox3

## 2022-04-18 NOTE — PROGRESS NOTES
Procedure   - Large Joint Arthrocentesis: R knee on 4/18/2022 2:49 PM  Indications: pain  Details: 22 G needle, anterolateral approach  Medications: 4 mL ropivacaine 0.5 %; 40 mg triamcinolone acetonide 40 MG/ML  Outcome: tolerated well, no immediate complications  Procedure, treatment alternatives, risks and benefits explained, specific risks discussed. Consent was given by the patient. Immediately prior to procedure a time out was called to verify the correct patient, procedure, equipment, support staff and site/side marked as required. Patient was prepped and draped in the usual sterile fashion.

## 2022-05-18 ENCOUNTER — TELEPHONE (OUTPATIENT)
Dept: ORTHOPEDIC SURGERY | Facility: CLINIC | Age: 82
End: 2022-05-18

## 2022-05-18 DIAGNOSIS — M17.11 PRIMARY OSTEOARTHRITIS OF RIGHT KNEE: Primary | ICD-10-CM

## 2022-05-18 NOTE — TELEPHONE ENCOUNTER
Provider: DR. RUTHERFORD  Caller: EVARISTO CHAUDHARI  Relationship to Patient: SELF  Phone Number: [phone]  Reason for Call: PATIENT CALLED INQUIRING ABOUT GETTING GEL INJECTION FOR RIGHT KNEE. LAST RIGHT KNEE INJECTION WAS DONE 04/18/22. THEY WOULD LIKE TO MOVE 08/22/22 APPOINTMENT. PLEASE ADVISE.

## 2022-06-06 NOTE — TELEPHONE ENCOUNTER
PT. CALLING BACK.   STATES THAT SHE HAS NEVER HEARD BACK ON GEL INJECTION FOR RIGHT KNEE.   SHE CALLED [employer]- AND THEY TOLD HER THAT THEY HAVE NEVER RECEIVED ANY REQUESTS TO GET GEL INJECTION APPROVED.   SHE IS ASKING IF SOMEONE FROM OFFICE WILL PLEASE CALL TO LET HER KNOW WHAT IS GOING ON WITH THIS.   [phone]

## 2022-06-29 ENCOUNTER — CLINICAL SUPPORT (OUTPATIENT)
Dept: ORTHOPEDIC SURGERY | Facility: CLINIC | Age: 82
End: 2022-06-29

## 2022-06-29 DIAGNOSIS — M17.11 PRIMARY OSTEOARTHRITIS OF RIGHT KNEE: Primary | ICD-10-CM

## 2022-06-29 PROCEDURE — 20610 DRAIN/INJ JOINT/BURSA W/O US: CPT | Performed by: ORTHOPAEDIC SURGERY

## 2022-06-29 NOTE — PROGRESS NOTES
[facility] [place] [facility] Clinic Note    Subjective     Chief Complaint   Patient presents with   • Follow-up     Orthovisc #1 injection right knee        HPI    Danay Gonzales is a 81 y.o. female who presents for the first Orthovisc injection into the right knee.    Patient Active Problem List   Diagnosis   • PVD (peripheral vascular disease) ([facility])   • Lumbar stenosis with neurogenic claudication   • Spondylosis of lumbar region without myelopathy or radiculopathy   • Sacroiliac joint dysfunction of left side   • Gait disturbance   • At high risk for falls   • Degeneration of lumbar or lumbosacral intervertebral disc   • Anemia with chronic illness     Past Medical History:   Diagnosis Date   • Anemia    • Arthritis    • Cancer ([facility])     skin   • Chronic constipation    • Diabetes mellitus ([facility])     borderline - no medications   • Disease of thyroid gland    • GERD (gastroesophageal reflux disease)    • Glaucoma    • Headache    • Hearing aid worn    • History of bronchitis    • History of migraine    • History of pneumonia    • Hypertension    • Kidney disease    • Low back pain    • Lumbar stenosis    • Menopause    • Mumps    • Osteoporosis    • PVD (peripheral vascular disease) ([facility])    • Stroke ([facility])     right hand weakness    • Vertigo    • Wears glasses       Past Surgical History:   Procedure Laterality Date   • BREAST BIOPSY Bilateral     BOTH BREASTS US BX, PT DOES NOT REMEMBER WHEN   • CATARACT EXTRACTION, BILATERAL     • COLONOSCOPY  10/2016   • HYSTERECTOMY  1981    Complete   • LUMBAR LAMINECTOMY DISCECTOMY DECOMPRESSION N/A 11/24/2020    Procedure: LUMBAR LAMINECTOMY L3- S1;  Surgeon: Jae Graves MD;  Location: [facility];  Service: Neurosurgery;  Laterality: N/A;   • OOPHORECTOMY      Comnplete Hysterectomy   • OTHER SURGICAL HISTORY  1999    Scalp   • SKIN CANCER EXCISION Left 2005    Hand   • TONSILLECTOMY  1944   • UPPER GASTROINTESTINAL ENDOSCOPY  10/2016    Esophageal Stretching       Family History   Problem Relation Age of Onset   • Heart disease Mother    • Heart attack Mother    • Stroke Mother    • Lung disease Father    • Heart disease Sister    • Cancer Sister    • Heart disease Brother    • Stroke Brother    • Cancer Sister    • Cancer Brother    • Stroke Brother    • Heart disease Brother    • Heart disease Brother    • Lung disease Sister    • COPD Sister    • Emphysema Sister    • Breast cancer Neg Hx    • Ovarian cancer Neg Hx      Social History     Socioeconomic History   • Marital status:    Tobacco Use   • Smoking status: Never Smoker   • Smokeless tobacco: Never Used   Vaping Use   • Vaping Use: Never used   Substance and Sexual Activity   • Alcohol use: No   • Drug use: No   • Sexual activity: Defer      Current Outpatient Medications on File Prior to Visit   Medication Sig Dispense Refill   • acetaminophen (TYLENOL) 500 MG tablet Take 500 mg by mouth Every 6 (Six) Hours As Needed.     • aspirin  MG tablet Take 325 mg by mouth Daily. Patient states Dr. Graves told her not to stop prior to surgery     • betamethasone valerate (VALISONE) 0.1 % ointment      • calcitriol (ROCALTROL) 0.25 MCG capsule Take 0.25 mcg by mouth As Needed.     • calcium carbonate (OS-YASMANY) 600 MG tablet Take 600 mg by mouth Daily.     • Cholecalciferol (VITAMIN D3) 125 MCG (5000 UT) capsule capsule Take 5,000 Units by mouth Daily.     • gabapentin (NEURONTIN) 100 MG capsule Take 1 capsule by mouth Every Night. 30 capsule 0   • hyoscyamine (LEVSIN) 0.125 MG SL tablet      • icosapent ethyl (Vascepa) 1 g capsule capsule Take 2 g by mouth 2 (Two) Times a Day With Meals. 120 capsule 5   • levothyroxine (SYNTHROID, LEVOTHROID) 75 MCG tablet Take 75 mcg by mouth Daily.     • Loperamide HCl (IMODIUM A-D PO) Take 1 tablet by mouth As Needed.     • magnesium gluconate (MAGONATE) 500 MG tablet Take 500 mg by mouth Daily.     • methylPREDNISolone (MEDROL) 4 MG dose pack      • Multiple Vitamin  (MULTI-VITAMIN DAILY PO) Take 1 tablet by mouth Daily.     • omeprazole (priLOSEC) 40 MG capsule      • ondansetron (ZOFRAN) 4 MG tablet Take 4 mg by mouth Every 8 (Eight) Hours As Needed for Nausea or Vomiting.     • ondansetron (ZOFRAN) 8 MG tablet      • potassium phosphate, monobasic, (K-PHOS) 500 MG tablet Take 500 mg by mouth Daily.     • promethazine-codeine (PHENERGAN with CODEINE) 6.25-10 MG/5ML solution      • rosuvastatin (CRESTOR) 40 MG tablet Take 40 mg by mouth Daily.     • SALINE MIST 0.65 % nasal spray      • Sod Picosulfate-Mag Ox-Cit Acd 10-3.5-12 MG-GM -GM/160ML solution Take 1 kit by mouth Take As Directed. Follow instructions mailed to your home. If you did not receive instructions please call [phone]. 320 mL 0   • triamterene-hydrochlorothiazide (MAXZIDE-25) 37.5-25 MG per tablet      • vitamin B-12 (CYANOCOBALAMIN) 1000 MCG tablet Take 1,000 mcg by mouth Daily.       No current facility-administered medications on file prior to visit.      Allergies   Allergen Reactions   • Erythromycin Rash        Review of Systems   Constitutional: Negative for activity change, appetite change, chills, diaphoresis, fatigue, fever and unexpected weight change.   HENT: Negative for congestion, dental problem, drooling, ear discharge, ear pain, facial swelling, hearing loss, mouth sores, nosebleeds, postnasal drip, rhinorrhea, sinus pressure, sneezing, sore throat, tinnitus, trouble swallowing and voice change.    Eyes: Negative for photophobia, pain, discharge, redness, itching and visual disturbance.   Respiratory: Negative for apnea, cough, choking, chest tightness, shortness of breath, wheezing and stridor.    Cardiovascular: Negative for chest pain, palpitations and leg swelling.   Gastrointestinal: Negative for abdominal distention, abdominal pain, anal bleeding, blood in stool, constipation, diarrhea, nausea, rectal pain and vomiting.   Endocrine: Negative for cold intolerance, heat intolerance,  polydipsia, polyphagia and polyuria.   Genitourinary: Negative for decreased urine volume, difficulty urinating, dysuria, enuresis, flank pain, frequency, genital sores, hematuria and urgency.   Musculoskeletal: Positive for arthralgias. Negative for back pain, gait problem, joint swelling, myalgias, neck pain and neck stiffness.   Skin: Negative for color change, pallor, rash and wound.   Allergic/Immunologic: Negative for environmental allergies, food allergies and immunocompromised state.   Neurological: Negative for dizziness, tremors, seizures, syncope, facial asymmetry, speech difficulty, weakness, light-headedness, numbness and headaches.   Hematological: Negative for adenopathy. Does not bruise/bleed easily.   Psychiatric/Behavioral: Negative for agitation, behavioral problems, confusion, decreased concentration, dysphoric mood, hallucinations, self-injury, sleep disturbance and suicidal ideas. The patient is not nervous/anxious and is not hyperactive.         Objective      Physical Exam  There were no vitals taken for this visit.    There is no height or weight on file to calculate BMI.    General:   Mental Status:  Alert   Appearance: Cooperative, in no acute distress   Posture: Normal    Assessment and Plan     Diagnoses and all orders for this visit:    1. Primary osteoarthritis of right knee (Primary)  -     Large Joint Arthrocentesis: R knee        1. Primary osteoarthritis of right knee        Procedure Note:  The potential benefits of performing a therapeutic knee joint visco supplementation injection, as well as potential risks (including, but not limited to infection, swelling, pain, bleeding, bruising, nerve/blood vessel damage, and pseudoseptic reaction) have been discussed with the patient.  After informed consent, timeout procedure was performed, and the skin on the right knee was prepped with chlorhexidine soap and alcohol, after which ethyl chloride was applied to the skin at the injection  site. Via the anterolateral approach, Orthovisc was injected into the knee joint.  The patient tolerated the procedure well. There were no complications.  Band-Aid was applied to the injection site. Post-procedural instructions discussed with the patient and/or their caregiver.    Return in about 1 week (around 7/6/2022) for Injection.    Jhony Griffin MD  06/29/22  16:09 EDT

## 2022-06-29 NOTE — PROGRESS NOTES
Procedure   Large Joint Arthrocentesis: R knee  Date/Time: 6/29/2022 3:48 PM  Consent given by: patient  Site marked: site marked  Timeout: Immediately prior to procedure a time out was called to verify the correct patient, procedure, equipment, support staff and site/side marked as required   Supporting Documentation  Indications: pain   Procedure Details  Location: knee - R knee  Preparation: Patient was prepped and draped in the usual sterile fashion  Needle size: 22 G  Approach: anterolateral  Medications administered: 30 mg Hyaluronan 30 MG/2ML  Patient tolerance: patient tolerated the procedure well with no immediate complications

## 2022-07-06 ENCOUNTER — CLINICAL SUPPORT (OUTPATIENT)
Dept: ORTHOPEDIC SURGERY | Facility: CLINIC | Age: 82
End: 2022-07-06

## 2022-07-06 DIAGNOSIS — M17.11 PRIMARY OSTEOARTHRITIS OF RIGHT KNEE: Primary | ICD-10-CM

## 2022-07-06 PROCEDURE — 20610 DRAIN/INJ JOINT/BURSA W/O US: CPT | Performed by: ORTHOPAEDIC SURGERY

## 2022-07-06 NOTE — PROGRESS NOTES
[facility] [place]    Subjective     Chief Complaint   Patient presents with   • Injections     Orthovisc Right Knee #2        HPI    Danay Gonzales is a 81 y.o. female who presents for the second Orthovisc injection into the right knee.  Of note, she has seen some improvement so far.    Patient Active Problem List   Diagnosis   • PVD (peripheral vascular disease) ([facility])   • Lumbar stenosis with neurogenic claudication   • Spondylosis of lumbar region without myelopathy or radiculopathy   • Sacroiliac joint dysfunction of left side   • Gait disturbance   • At high risk for falls   • Degeneration of lumbar or lumbosacral intervertebral disc   • Anemia with chronic illness     Past Medical History:   Diagnosis Date   • Anemia    • Arthritis    • Cancer (HCC)     skin   • Chronic constipation    • Diabetes mellitus (HCC)     borderline - no medications   • Disease of thyroid gland    • GERD (gastroesophageal reflux disease)    • Glaucoma    • Headache    • Hearing aid worn    • History of bronchitis    • History of migraine    • History of pneumonia    • Hypertension    • Kidney disease    • Low back pain    • Lumbar stenosis    • Menopause    • Mumps    • Osteoporosis    • PVD (peripheral vascular disease) (HCC)    • Stroke (HCC)     right hand weakness    • Vertigo    • Wears glasses       Past Surgical History:   Procedure Laterality Date   • BREAST BIOPSY Bilateral     BOTH BREASTS US BX, PT DOES NOT REMEMBER WHEN   • CATARACT EXTRACTION, BILATERAL     • COLONOSCOPY  10/2016   • HYSTERECTOMY  1981    Complete   • LUMBAR LAMINECTOMY DISCECTOMY DECOMPRESSION N/A 11/24/2020    Procedure: LUMBAR LAMINECTOMY L3- S1;  Surgeon: Jae Graves MD;  Location: [facility];  Service: Neurosurgery;  Laterality: N/A;   • OOPHORECTOMY      Comnplete Hysterectomy   • OTHER SURGICAL HISTORY  1999    Scalp   • SKIN CANCER EXCISION Left 2005    Hand   • TONSILLECTOMY  1944   • UPPER GASTROINTESTINAL ENDOSCOPY   10/2016    Esophageal Stretching      Family History   Problem Relation Age of Onset   • Heart disease Mother    • Heart attack Mother    • Stroke Mother    • Lung disease Father    • Heart disease Sister    • Cancer Sister    • Heart disease Brother    • Stroke Brother    • Cancer Sister    • Cancer Brother    • Stroke Brother    • Heart disease Brother    • Heart disease Brother    • Lung disease Sister    • COPD Sister    • Emphysema Sister    • Breast cancer Neg Hx    • Ovarian cancer Neg Hx      Social History     Socioeconomic History   • Marital status:    Tobacco Use   • Smoking status: Never Smoker   • Smokeless tobacco: Never Used   Vaping Use   • Vaping Use: Never used   Substance and Sexual Activity   • Alcohol use: No   • Drug use: No   • Sexual activity: Defer      Current Outpatient Medications on File Prior to Visit   Medication Sig Dispense Refill   • acetaminophen (TYLENOL) 500 MG tablet Take 500 mg by mouth Every 6 (Six) Hours As Needed.     • aspirin  MG tablet Take 325 mg by mouth Daily. Patient states Dr. Graves told her not to stop prior to surgery     • betamethasone valerate (VALISONE) 0.1 % ointment      • calcitriol (ROCALTROL) 0.25 MCG capsule Take 0.25 mcg by mouth As Needed.     • calcium carbonate (OS-YASMANY) 600 MG tablet Take 600 mg by mouth Daily.     • Cholecalciferol (VITAMIN D3) 125 MCG (5000 UT) capsule capsule Take 5,000 Units by mouth Daily.     • gabapentin (NEURONTIN) 100 MG capsule Take 1 capsule by mouth Every Night. 30 capsule 0   • hyoscyamine (LEVSIN) 0.125 MG SL tablet      • icosapent ethyl (Vascepa) 1 g capsule capsule Take 2 g by mouth 2 (Two) Times a Day With Meals. 120 capsule 5   • levothyroxine (SYNTHROID, LEVOTHROID) 75 MCG tablet Take 75 mcg by mouth Daily.     • Loperamide HCl (IMODIUM A-D PO) Take 1 tablet by mouth As Needed.     • magnesium gluconate (MAGONATE) 500 MG tablet Take 500 mg by mouth Daily.     • methylPREDNISolone (MEDROL) 4 MG dose  pack      • Multiple Vitamin (MULTI-VITAMIN DAILY PO) Take 1 tablet by mouth Daily.     • omeprazole (priLOSEC) 40 MG capsule      • ondansetron (ZOFRAN) 4 MG tablet Take 4 mg by mouth Every 8 (Eight) Hours As Needed for Nausea or Vomiting.     • ondansetron (ZOFRAN) 8 MG tablet      • potassium phosphate, monobasic, (K-PHOS) 500 MG tablet Take 500 mg by mouth Daily.     • promethazine-codeine (PHENERGAN with CODEINE) 6.25-10 MG/5ML solution      • rosuvastatin (CRESTOR) 40 MG tablet Take 40 mg by mouth Daily.     • SALINE MIST 0.65 % nasal spray      • Sod Picosulfate-Mag Ox-Cit Acd 10-3.5-12 MG-GM -GM/160ML solution Take 1 kit by mouth Take As Directed. Follow instructions mailed to your home. If you did not receive instructions please call [phone]. 320 mL 0   • triamterene-hydrochlorothiazide (MAXZIDE-25) 37.5-25 MG per tablet      • vitamin B-12 (CYANOCOBALAMIN) 1000 MCG tablet Take 1,000 mcg by mouth Daily.       No current facility-administered medications on file prior to visit.      Allergies   Allergen Reactions   • Erythromycin Rash        Review of Systems   Constitutional: Negative for activity change, appetite change, chills, diaphoresis, fatigue, fever and unexpected weight change.   HENT: Negative for congestion, dental problem, drooling, ear discharge, ear pain, facial swelling, hearing loss, mouth sores, nosebleeds, postnasal drip, rhinorrhea, sinus pressure, sneezing, sore throat, tinnitus, trouble swallowing and voice change.    Eyes: Negative for photophobia, pain, discharge, redness, itching and visual disturbance.   Respiratory: Negative for apnea, cough, choking, chest tightness, shortness of breath, wheezing and stridor.    Cardiovascular: Negative for chest pain, palpitations and leg swelling.   Gastrointestinal: Negative for abdominal distention, abdominal pain, anal bleeding, blood in stool, constipation, diarrhea, nausea, rectal pain and vomiting.   Endocrine: Negative for cold  intolerance, heat intolerance, polydipsia, polyphagia and polyuria.   Genitourinary: Negative for decreased urine volume, difficulty urinating, dysuria, enuresis, flank pain, frequency, genital sores, hematuria and urgency.   Musculoskeletal: Positive for arthralgias. Negative for back pain, gait problem, joint swelling, myalgias, neck pain and neck stiffness.   Skin: Negative for color change, pallor, rash and wound.   Allergic/Immunologic: Negative for environmental allergies, food allergies and immunocompromised state.   Neurological: Negative for dizziness, tremors, seizures, syncope, facial asymmetry, speech difficulty, weakness, light-headedness, numbness and headaches.   Hematological: Negative for adenopathy. Does not bruise/bleed easily.   Psychiatric/Behavioral: Negative for agitation, behavioral problems, confusion, decreased concentration, dysphoric mood, hallucinations, self-injury, sleep disturbance and suicidal ideas. The patient is not nervous/anxious and is not hyperactive.         Objective      Physical Exam  There were no vitals taken for this visit.    There is no height or weight on file to calculate BMI.    General:   Mental Status:  Alert   Appearance: Cooperative, in no acute distress   Posture: Normal    Assessment and Plan     Diagnoses and all orders for this visit:    1. Primary osteoarthritis of right knee (Primary)  -     Large Joint Arthrocentesis: R knee        1. Primary osteoarthritis of right knee        Procedure Note:  The potential benefits of performing a therapeutic knee joint visco supplementation injection, as well as potential risks (including, but not limited to infection, swelling, pain, bleeding, bruising, nerve/blood vessel damage, and pseudoseptic reaction) have been discussed with the patient.  After informed consent, timeout procedure was performed, and the skin on the right knee was prepped with chlorhexidine soap and alcohol, after which ethyl chloride was applied  to the skin at the injection site. Via the anterolateral approach, Orthovisc was injected into the knee joint.  The patient tolerated the procedure well. There were no complications.  Band-Aid was applied to the injection site. Post-procedural instructions discussed with the patient and/or their caregiver.    Return in about 1 week (around 7/13/2022) for Injection.    Jhony Griffin MD  07/06/22  15:51 EDT

## 2022-07-06 NOTE — PROGRESS NOTES
Procedure   Large Joint Arthrocentesis: R knee  Date/Time: 7/6/2022 3:15 PM  Consent given by: patient  Site marked: site marked  Timeout: Immediately prior to procedure a time out was called to verify the correct patient, procedure, equipment, support staff and site/side marked as required   Supporting Documentation  Indications: pain   Procedure Details  Location: knee - R knee  Preparation: Patient was prepped and draped in the usual sterile fashion  Needle size: 22 G  Approach: anterolateral  Medications administered: 30 mg Hyaluronan 30 MG/2ML  Patient tolerance: patient tolerated the procedure well with no immediate complications

## 2022-07-13 ENCOUNTER — CLINICAL SUPPORT (OUTPATIENT)
Dept: ORTHOPEDIC SURGERY | Facility: CLINIC | Age: 82
End: 2022-07-13

## 2022-07-13 DIAGNOSIS — M17.11 PRIMARY OSTEOARTHRITIS OF RIGHT KNEE: Primary | ICD-10-CM

## 2022-07-13 PROCEDURE — 20610 DRAIN/INJ JOINT/BURSA W/O US: CPT | Performed by: ORTHOPAEDIC SURGERY

## 2022-07-13 RX ORDER — LANCETS
EACH MISCELLANEOUS
COMMUNITY
Start: 2022-07-06

## 2022-07-13 NOTE — PROGRESS NOTES
[facility] [place] [facility] Note    Subjective     Chief Complaint   Patient presents with   • Injections     Orthovisc # 3 Right Knee        HPI    Danay Gonzales is a 81 y.o. female who presents for the third and final Orthovisc injection into the right knee.  Of note, she has seen some improvement so far.    Patient Active Problem List   Diagnosis   • PVD (peripheral vascular disease) ([facility])   • Lumbar stenosis with neurogenic claudication   • Spondylosis of lumbar region without myelopathy or radiculopathy   • Sacroiliac joint dysfunction of left side   • Gait disturbance   • At high risk for falls   • Degeneration of lumbar or lumbosacral intervertebral disc   • Anemia with chronic illness     Past Medical History:   Diagnosis Date   • Anemia    • Arthritis    • Cancer (HCC)     skin   • Chronic constipation    • Diabetes mellitus (HCC)     borderline - no medications   • Disease of thyroid gland    • GERD (gastroesophageal reflux disease)    • Glaucoma    • Headache    • Hearing aid worn    • History of bronchitis    • History of migraine    • History of pneumonia    • Hypertension    • Kidney disease    • Low back pain    • Lumbar stenosis    • Menopause    • Mumps    • Osteoporosis    • PVD (peripheral vascular disease) ([facility])    • Stroke (HCC)     right hand weakness    • Vertigo    • Wears glasses       Past Surgical History:   Procedure Laterality Date   • BREAST BIOPSY Bilateral     BOTH BREASTS US BX, PT DOES NOT REMEMBER WHEN   • CATARACT EXTRACTION, BILATERAL     • COLONOSCOPY  10/2016   • HYSTERECTOMY  1981    Complete   • LUMBAR LAMINECTOMY DISCECTOMY DECOMPRESSION N/A 11/24/2020    Procedure: LUMBAR LAMINECTOMY L3- S1;  Surgeon: Jae Graves MD;  Location: [facility];  Service: Neurosurgery;  Laterality: N/A;   • OOPHORECTOMY      Comnplete Hysterectomy   • OTHER SURGICAL HISTORY  1999    Scalp   • SKIN CANCER EXCISION Left 2005    Hand   • TONSILLECTOMY  1944   • UPPER GASTROINTESTINAL  ENDOSCOPY  10/2016    Esophageal Stretching      Family History   Problem Relation Age of Onset   • Heart disease Mother    • Heart attack Mother    • Stroke Mother    • Lung disease Father    • Heart disease Sister    • Cancer Sister    • Heart disease Brother    • Stroke Brother    • Cancer Sister    • Cancer Brother    • Stroke Brother    • Heart disease Brother    • Heart disease Brother    • Lung disease Sister    • COPD Sister    • Emphysema Sister    • Breast cancer Neg Hx    • Ovarian cancer Neg Hx      Social History     Socioeconomic History   • Marital status:    Tobacco Use   • Smoking status: Never Smoker   • Smokeless tobacco: Never Used   Vaping Use   • Vaping Use: Never used   Substance and Sexual Activity   • Alcohol use: No   • Drug use: No   • Sexual activity: Defer      Current Outpatient Medications on File Prior to Visit   Medication Sig Dispense Refill   • Accu-Chek FastClix Lancets misc      • acetaminophen (TYLENOL) 500 MG tablet Take 500 mg by mouth Every 6 (Six) Hours As Needed.     • aspirin  MG tablet Take 325 mg by mouth Daily. Patient states Dr. Graves told her not to stop prior to surgery     • betamethasone valerate (VALISONE) 0.1 % ointment      • calcitriol (ROCALTROL) 0.25 MCG capsule Take 0.25 mcg by mouth As Needed.     • calcium carbonate (OS-YASMANY) 600 MG tablet Take 600 mg by mouth Daily.     • Cholecalciferol (VITAMIN D3) 125 MCG (5000 UT) capsule capsule Take 5,000 Units by mouth Daily.     • gabapentin (NEURONTIN) 100 MG capsule Take 1 capsule by mouth Every Night. 30 capsule 0   • hyoscyamine (LEVSIN) 0.125 MG SL tablet      • icosapent ethyl (Vascepa) 1 g capsule capsule Take 2 g by mouth 2 (Two) Times a Day With Meals. 120 capsule 5   • levothyroxine (SYNTHROID, LEVOTHROID) 75 MCG tablet Take 75 mcg by mouth Daily.     • Loperamide HCl (IMODIUM A-D PO) Take 1 tablet by mouth As Needed.     • magnesium gluconate (MAGONATE) 500 MG tablet Take 500 mg by mouth  Daily.     • methylPREDNISolone (MEDROL) 4 MG dose pack      • Multiple Vitamin (MULTI-VITAMIN DAILY PO) Take 1 tablet by mouth Daily.     • omeprazole (priLOSEC) 40 MG capsule      • ondansetron (ZOFRAN) 4 MG tablet Take 4 mg by mouth Every 8 (Eight) Hours As Needed for Nausea or Vomiting.     • ondansetron (ZOFRAN) 8 MG tablet      • potassium phosphate, monobasic, (K-PHOS) 500 MG tablet Take 500 mg by mouth Daily.     • promethazine-codeine (PHENERGAN with CODEINE) 6.25-10 MG/5ML solution      • rosuvastatin (CRESTOR) 40 MG tablet Take 40 mg by mouth Daily.     • SALINE MIST 0.65 % nasal spray      • Sod Picosulfate-Mag Ox-Cit Acd 10-3.5-12 MG-GM -GM/160ML solution Take 1 kit by mouth Take As Directed. Follow instructions mailed to your home. If you did not receive instructions please call [phone]. 320 mL 0   • triamterene-hydrochlorothiazide (MAXZIDE-25) 37.5-25 MG per tablet      • vitamin B-12 (CYANOCOBALAMIN) 1000 MCG tablet Take 1,000 mcg by mouth Daily.       No current facility-administered medications on file prior to visit.      Allergies   Allergen Reactions   • Erythromycin Rash        Review of Systems   Constitutional: Negative for activity change, appetite change, chills, diaphoresis, fatigue, fever and unexpected weight change.   HENT: Negative for congestion, dental problem, drooling, ear discharge, ear pain, facial swelling, hearing loss, mouth sores, nosebleeds, postnasal drip, rhinorrhea, sinus pressure, sneezing, sore throat, tinnitus, trouble swallowing and voice change.    Eyes: Negative for photophobia, pain, discharge, redness, itching and visual disturbance.   Respiratory: Negative for apnea, cough, choking, chest tightness, shortness of breath, wheezing and stridor.    Cardiovascular: Negative for chest pain, palpitations and leg swelling.   Gastrointestinal: Negative for abdominal distention, abdominal pain, anal bleeding, blood in stool, constipation, diarrhea, nausea, rectal  pain and vomiting.   Endocrine: Negative for cold intolerance, heat intolerance, polydipsia, polyphagia and polyuria.   Genitourinary: Negative for decreased urine volume, difficulty urinating, dysuria, enuresis, flank pain, frequency, genital sores, hematuria and urgency.   Musculoskeletal: Positive for arthralgias. Negative for back pain, gait problem, joint swelling, myalgias, neck pain and neck stiffness.   Skin: Negative for color change, pallor, rash and wound.   Allergic/Immunologic: Negative for environmental allergies, food allergies and immunocompromised state.   Neurological: Negative for dizziness, tremors, seizures, syncope, facial asymmetry, speech difficulty, weakness, light-headedness, numbness and headaches.   Hematological: Negative for adenopathy. Does not bruise/bleed easily.   Psychiatric/Behavioral: Negative for agitation, behavioral problems, confusion, decreased concentration, dysphoric mood, hallucinations, self-injury, sleep disturbance and suicidal ideas. The patient is not nervous/anxious and is not hyperactive.         Objective      Physical Exam  There were no vitals taken for this visit.    There is no height or weight on file to calculate BMI.    General:   Mental Status:  Alert   Appearance: Cooperative, in no acute distress   Posture: Normal    Assessment and Plan     Diagnoses and all orders for this visit:    1. Primary osteoarthritis of right knee (Primary)  -     Large Joint Arthrocentesis: R knee        1. Primary osteoarthritis of right knee        Procedure Note:  The potential benefits of performing a therapeutic knee joint visco supplementation injection, as well as potential risks (including, but not limited to infection, swelling, pain, bleeding, bruising, nerve/blood vessel damage, and pseudoseptic reaction) have been discussed with the patient.  After informed consent, timeout procedure was performed, and the skin on the right knee was prepped with chlorhexidine soap  and alcohol, after which ethyl chloride was applied to the skin at the injection site. Via the anterolateral approach, Orthovisc was injected into the knee joint.  The patient tolerated the procedure well. There were no complications.  Band-Aid was applied to the injection site. Post-procedural instructions discussed with the patient and/or their caregiver.    Return in about 6 months (around 1/13/2023).    Jhony Griffin MD  07/13/22  15:32 EDT

## 2022-07-13 NOTE — PROGRESS NOTES
Procedure   Large Joint Arthrocentesis: R knee  Date/Time: 7/13/2022 3:16 PM  Consent given by: patient  Site marked: site marked  Timeout: Immediately prior to procedure a time out was called to verify the correct patient, procedure, equipment, support staff and site/side marked as required   Supporting Documentation  Indications: pain   Procedure Details  Location: knee - R knee  Preparation: Patient was prepped and draped in the usual sterile fashion  Needle size: 22 G  Approach: anterolateral  Medications administered: 30 mg Hyaluronan 30 MG/2ML  Patient tolerance: patient tolerated the procedure well with no immediate complications

## 2022-08-18 ENCOUNTER — OFFICE VISIT (OUTPATIENT)
Dept: ONCOLOGY | Facility: CLINIC | Age: 82
End: 2022-08-18

## 2022-08-18 VITALS
BODY MASS INDEX: 22.98 KG/M2 | TEMPERATURE: 97.1 F | RESPIRATION RATE: 18 BRPM | SYSTOLIC BLOOD PRESSURE: 148 MMHG | HEART RATE: 77 BPM | OXYGEN SATURATION: 99 % | HEIGHT: 59 IN | DIASTOLIC BLOOD PRESSURE: 74 MMHG | WEIGHT: 114 LBS

## 2022-08-18 DIAGNOSIS — D64.9 NORMOCHROMIC NORMOCYTIC ANEMIA: Primary | ICD-10-CM

## 2022-08-18 PROCEDURE — 99213 OFFICE O/P EST LOW 20 MIN: CPT | Performed by: INTERNAL MEDICINE

## 2022-08-18 NOTE — PROGRESS NOTES
PROBLEM LIST:    1.  Normocytic anemia likely a component of anemia of age intermixed with anemia of chronic renal insufficiency.  2.  Back pain followed by Dr. Williamson    REASON FOR VISIT: Mild anemia    HISTORY OF PRESENT ILLNESS:   82 y.o.  female presents today for mild anemia.  18 months since I last saw her.  Clinically doing reasonably well for her age.  No major health issues.  She does have renal dysfunction.      Past medical history, social history and family history was reviewed and unchanged from prior visit.    Review of Systems:    Review of Systems - Oncology         Medications:        Current Outpatient Medications:   •  Accu-Chek FastClix Lancets misc, , Disp: , Rfl:   •  acetaminophen (TYLENOL) 500 MG tablet, Take 500 mg by mouth Every 6 (Six) Hours As Needed., Disp: , Rfl:   •  aspirin  MG tablet, Take 325 mg by mouth Daily. Patient states Dr. Graves told her not to stop prior to surgery, Disp: , Rfl:   •  calcium carbonate (OS-YASMANY) 600 MG tablet, Take 600 mg by mouth Daily., Disp: , Rfl:   •  Cholecalciferol (VITAMIN D3) 125 MCG (5000 UT) capsule capsule, Take 5,000 Units by mouth Daily., Disp: , Rfl:   •  gabapentin (NEURONTIN) 100 MG capsule, Take 1 capsule by mouth Every Night., Disp: 30 capsule, Rfl: 0  •  hyoscyamine (LEVSIN) 0.125 MG SL tablet, , Disp: , Rfl:   •  levothyroxine (SYNTHROID, LEVOTHROID) 75 MCG tablet, Take 75 mcg by mouth Daily., Disp: , Rfl:   •  magnesium gluconate (MAGONATE) 500 MG tablet, Take 500 mg by mouth Daily., Disp: , Rfl:   •  Multiple Vitamin (MULTI-VITAMIN DAILY PO), Take 1 tablet by mouth Daily., Disp: , Rfl:   •  omeprazole (priLOSEC) 40 MG capsule, , Disp: , Rfl:   •  ondansetron (ZOFRAN) 4 MG tablet, Take 4 mg by mouth Every 8 (Eight) Hours As Needed for Nausea or Vomiting., Disp: , Rfl:   •  ondansetron (ZOFRAN) 8 MG tablet, , Disp: , Rfl:   •  potassium phosphate, monobasic, (K-PHOS) 500 MG tablet, Take 500 mg by mouth Daily., Disp: , Rfl:   •   "rosuvastatin (CRESTOR) 40 MG tablet, Take 40 mg by mouth Daily., Disp: , Rfl:   •  SALINE MIST 0.65 % nasal spray, , Disp: , Rfl:   •  triamterene-hydrochlorothiazide (MAXZIDE-25) 37.5-25 MG per tablet, , Disp: , Rfl:   •  betamethasone valerate (VALISONE) 0.1 % ointment, , Disp: , Rfl:   •  calcitriol (ROCALTROL) 0.25 MCG capsule, Take 0.25 mcg by mouth As Needed., Disp: , Rfl:   •  icosapent ethyl (Vascepa) 1 g capsule capsule, Take 2 g by mouth 2 (Two) Times a Day With Meals., Disp: 120 capsule, Rfl: 5  •  Loperamide HCl (IMODIUM A-D PO), Take 1 tablet by mouth As Needed., Disp: , Rfl:   •  methylPREDNISolone (MEDROL) 4 MG dose pack, , Disp: , Rfl:   •  promethazine-codeine (PHENERGAN with CODEINE) 6.25-10 MG/5ML solution, , Disp: , Rfl:   •  Sod Picosulfate-Mag Ox-Cit Acd 10-3.5-12 MG-GM -GM/160ML solution, Take 1 kit by mouth Take As Directed. Follow instructions mailed to your home. If you did not receive instructions please call [phone]., Disp: 320 mL, Rfl: 0  •  vitamin B-12 (CYANOCOBALAMIN) 1000 MCG tablet, Take 1,000 mcg by mouth Daily., Disp: , Rfl:     Pain Medications             acetaminophen (TYLENOL) 500 MG tablet Take 500 mg by mouth Every 6 (Six) Hours As Needed.    aspirin  MG tablet Take 325 mg by mouth Daily. Patient states Dr. Graves told her not to stop prior to surgery    gabapentin (NEURONTIN) 100 MG capsule Take 1 capsule by mouth Every Night.    methylPREDNISolone (MEDROL) 4 MG dose pack              ALLERGIES:    Allergies   Allergen Reactions   • Erythromycin Rash         Physical Exam    VITAL SIGNS:  /74 Comment: LUE  Pulse 77   Temp 97.1 °F (36.2 °C) (Infrared)   Resp 18   Ht 149.9 cm (59\")   Wt 51.7 kg (114 lb)   SpO2 99% Comment: RA  BMI 23.03 kg/m²     Wt Readings from Last 3 Encounters:   08/18/22 51.7 kg (114 lb)   04/18/22 48.5 kg (107 lb)   01/03/22 49.1 kg (108 lb 3.2 oz)       Body mass index is 23.03 kg/m². Body surface area is 1.45 meters " squared.    Pain Score    08/18/22 1515   PainSc: 0-No pain      General: well appearing, in no acute distress  HEENT: sclera anicteric,  neck is supple  Lymphatics: no cervical, supraclavicular, or axillary adenopathy  Cardiovascular: regular rate and rhythm, no murmurs, rubs or gallops  Lungs: clear to auscultation bilaterally  Abdomen: soft, nontender, nondistended.  No palpable organomegaly  Extremities: no lower extremity edema  Skin: no rashes, lesions, bruising, or petechiae  Msk:  Shows no weakness of the large muscle groups  Psych: Mood is stable            RECENT LABS:    Hemoglobin of 11.1 with a normal white count and platelet count.        Assessment/Plan    1.  Mild anemia.  Over the last 18 months her numbers have not changed significantly.  She likely has a component of anemia of age intermixed with anemia and renal dysfunction.  However with a hemoglobin above 10 there is no role for EPO replacement.  At this time I do not recommend any intervention.  If her numbers changed in the future and she becomes more transfusion dependent then we can consider EPO replacement.  She will follow-up with her primary care.        Shira Kelly MD  [facility]    Return in (Approximately): PRN    No orders of the defined types were placed in this encounter.      8/18/2022

## 2022-10-18 ENCOUNTER — TRANSCRIBE ORDERS (OUTPATIENT)
Dept: ADMINISTRATIVE | Facility: HOSPITAL | Age: 82
End: 2022-10-18

## 2022-10-18 DIAGNOSIS — Z12.31 VISIT FOR SCREENING MAMMOGRAM: Primary | ICD-10-CM

## 2022-12-15 ENCOUNTER — HOSPITAL ENCOUNTER (OUTPATIENT)
Dept: MAMMOGRAPHY | Facility: HOSPITAL | Age: 82
Discharge: HOME | End: 2022-12-15
Admitting: FAMILY MEDICINE

## 2022-12-15 DIAGNOSIS — Z12.31 VISIT FOR SCREENING MAMMOGRAM: ICD-10-CM

## 2022-12-15 PROCEDURE — 77063 BREAST TOMOSYNTHESIS BI: CPT

## 2022-12-15 PROCEDURE — 77063 BREAST TOMOSYNTHESIS BI: CPT | Performed by: RADIOLOGY

## 2022-12-15 PROCEDURE — 77067 SCR MAMMO BI INCL CAD: CPT | Performed by: RADIOLOGY

## 2022-12-15 PROCEDURE — 77067 SCR MAMMO BI INCL CAD: CPT

## 2023-01-30 ENCOUNTER — OFFICE VISIT (OUTPATIENT)
Dept: ORTHOPEDIC SURGERY | Facility: CLINIC | Age: 83
End: 2023-01-30
Payer: MEDICARE

## 2023-01-30 VITALS
SYSTOLIC BLOOD PRESSURE: 130 MMHG | DIASTOLIC BLOOD PRESSURE: 80 MMHG | HEIGHT: 59 IN | WEIGHT: 111 LBS | BODY MASS INDEX: 22.38 KG/M2

## 2023-01-30 DIAGNOSIS — M17.11 PRIMARY OSTEOARTHRITIS OF RIGHT KNEE: Primary | ICD-10-CM

## 2023-01-30 PROCEDURE — 99212 OFFICE O/P EST SF 10 MIN: CPT | Performed by: ORTHOPAEDIC SURGERY

## 2023-01-30 RX ORDER — ROSUVASTATIN CALCIUM 40 MG/1
TABLET, COATED ORAL DAILY
COMMUNITY
Start: 2023-01-16

## 2023-01-30 RX ORDER — NIACIN 500 MG
TABLET ORAL DAILY
COMMUNITY

## 2023-01-30 RX ORDER — LEVOTHYROXINE SODIUM 0.07 MG/1
TABLET ORAL DAILY
COMMUNITY
Start: 2023-01-16

## 2023-01-30 RX ORDER — HYDROXYCHLOROQUINE SULFATE 100 MG/1
TABLET ORAL
COMMUNITY

## 2023-01-30 NOTE — PROGRESS NOTES
[facility]    Subjective     Chief Complaint   Patient presents with   • Follow-up     6 month recheck - Primary osteoarthritis of right knee        HPI    It has been 6  month(s) since Ms. Gonzales's last visit. She returns to clinic today for follow-up of right knee arthritis. The issue has been ongoing for 25 year(s). She rates her pain a 8/10 on the pain scale. Previous/current treatments: cane/walker, NSAIDS and physical therapy. Current symptoms: pain, swelling and giving way/buckling. The pain is worse with walking and standing; resting, sitting, pain medication and/or NSAID and elevating the extremity improve the pain. Overall, she is doing better.  The viscosupplementation injections helped for about 2 months.  She would like a repeat series at this time.  She does have rheumatoid arthritis and Sjogren's syndrome.    I have reviewed the following portions of the patient's history and agree with: History of Present Illness and Review of Systems    Patient Active Problem List   Diagnosis   • PVD (peripheral vascular disease) (Formerly [facility])   • Lumbar stenosis with neurogenic claudication   • Spondylosis of lumbar region without myelopathy or radiculopathy   • Sacroiliac joint dysfunction of left side   • Gait disturbance   • At high risk for falls   • Degeneration of lumbar or lumbosacral intervertebral disc   • Anemia with chronic illness     Past Medical History:   Diagnosis Date   • Anemia    • Arthritis    • Cancer (Former[facility])     skin   • Chronic constipation    • Diabetes mellitus ([facility])     borderline - no medications   • Disease of thyroid gland    • GERD (gastroesophageal reflux disease)    • Glaucoma    • Headache    • Hearing aid worn    • History of bronchitis    • History of migraine    • History of pneumonia    • Hypertension    • Kidney disease    • Low back pain    • Lumbar stenosis    • Menopause    • Mumps    • Osteoporosis    • PVD (peripheral vascular disease) (Formerly [facility])    • Stroke  (HCC)     right hand weakness    • Vertigo    • Wears glasses       Past Surgical History:   Procedure Laterality Date   • BREAST BIOPSY Bilateral     BOTH BREASTS US BX, PT DOES NOT REMEMBER WHEN   • CATARACT EXTRACTION, BILATERAL     • COLONOSCOPY  10/2016   • HYSTERECTOMY  1981    Complete   • LUMBAR LAMINECTOMY DISCECTOMY DECOMPRESSION N/A 11/24/2020    Procedure: LUMBAR LAMINECTOMY L3- S1;  Surgeon: Jae Graves MD;  Location: [facility];  Service: Neurosurgery;  Laterality: N/A;   • OOPHORECTOMY      Comnplete Hysterectomy   • OTHER SURGICAL HISTORY  1999    Scalp   • SKIN CANCER EXCISION Left 2005    Hand   • TONSILLECTOMY  1944   • UPPER GASTROINTESTINAL ENDOSCOPY  10/2016    Esophageal Stretching      Family History   Problem Relation Age of Onset   • Heart disease Mother    • Heart attack Mother    • Stroke Mother    • Lung disease Father    • Heart disease Sister    • Cancer Sister    • Heart disease Brother    • Stroke Brother    • Cancer Sister    • Cancer Brother    • Stroke Brother    • Heart disease Brother    • Heart disease Brother    • Lung disease Sister    • COPD Sister    • Emphysema Sister    • Breast cancer Neg Hx    • Ovarian cancer Neg Hx      Social History     Socioeconomic History   • Marital status:    Tobacco Use   • Smoking status: Never   • Smokeless tobacco: Never   Vaping Use   • Vaping Use: Never used   Substance and Sexual Activity   • Alcohol use: No   • Drug use: No   • Sexual activity: Defer      Current Outpatient Medications on File Prior to Visit   Medication Sig Dispense Refill   • Accu-Chek FastClix Lancets misc      • acetaminophen (TYLENOL) 500 MG tablet Take 500 mg by mouth Every 6 (Six) Hours As Needed.     • aspirin  MG tablet Take 325 mg by mouth Daily. Patient states Dr. Graves told her not to stop prior to surgery     • calcium carbonate (OS-YASMANY) 600 MG tablet Take 600 mg by mouth Daily.     • Cholecalciferol (VITAMIN D3) 125 MCG (5000 UT) capsule  capsule Take 5,000 Units by mouth Daily.     • gabapentin (NEURONTIN) 100 MG capsule Take 1 capsule by mouth Every Night. 30 capsule 0   • Hydroxychloroquine Sulfate 100 MG tablet Take  by mouth.     • hyoscyamine (LEVSIN) 0.125 MG SL tablet      • levothyroxine (SYNTHROID, LEVOTHROID) 75 MCG tablet Take  by mouth Daily.     • magnesium gluconate (MAGONATE) 500 MG tablet Take 500 mg by mouth Daily.     • Multiple Vitamin (MULTI-VITAMIN DAILY PO) Take 1 tablet by mouth Daily.     • niacin 500 MG tablet Take  by mouth Daily.     • omeprazole (priLOSEC) 40 MG capsule      • ondansetron (ZOFRAN) 4 MG tablet Take 4 mg by mouth Every 8 (Eight) Hours As Needed for Nausea or Vomiting.     • ondansetron (ZOFRAN) 8 MG tablet      • potassium phosphate, monobasic, (K-PHOS) 500 MG tablet Take 500 mg by mouth Daily.     • rosuvastatin (CRESTOR) 40 MG tablet Take  by mouth Daily.     • SALINE MIST 0.65 % nasal spray      • triamterene-hydrochlorothiazide (MAXZIDE-25) 37.5-25 MG per tablet      • levothyroxine (SYNTHROID, LEVOTHROID) 75 MCG tablet Take 75 mcg by mouth Daily.     • rosuvastatin (CRESTOR) 40 MG tablet Take 40 mg by mouth Daily.       No current facility-administered medications on file prior to visit.      Allergies   Allergen Reactions   • Erythromycin Rash        Review of Systems   Constitutional: Negative for activity change, appetite change, chills, diaphoresis, fatigue, fever and unexpected weight change.   HENT: Negative for congestion, dental problem, drooling, ear discharge, ear pain, facial swelling, hearing loss, mouth sores, nosebleeds, postnasal drip, rhinorrhea, sinus pressure, sneezing, sore throat, tinnitus, trouble swallowing and voice change.    Eyes: Negative for photophobia, pain, discharge, redness, itching and visual disturbance.   Respiratory: Negative for apnea, cough, choking, chest tightness, shortness of breath, wheezing and stridor.    Cardiovascular: Negative for chest pain, palpitations  "and leg swelling.   Gastrointestinal: Negative for abdominal distention, abdominal pain, anal bleeding, blood in stool, constipation, diarrhea, nausea, rectal pain and vomiting.   Endocrine: Negative for cold intolerance, heat intolerance, polydipsia, polyphagia and polyuria.   Genitourinary: Negative for decreased urine volume, difficulty urinating, dysuria, enuresis, flank pain, frequency, genital sores, hematuria and urgency.   Musculoskeletal: Positive for arthralgias. Negative for back pain, gait problem, joint swelling, myalgias, neck pain and neck stiffness.   Skin: Negative for color change, pallor, rash and wound.   Allergic/Immunologic: Negative for environmental allergies, food allergies and immunocompromised state.   Neurological: Negative for dizziness, tremors, seizures, syncope, facial asymmetry, speech difficulty, weakness, light-headedness, numbness and headaches.   Hematological: Negative for adenopathy. Does not bruise/bleed easily.   Psychiatric/Behavioral: Negative for agitation, behavioral problems, confusion, decreased concentration, dysphoric mood, hallucinations, self-injury, sleep disturbance and suicidal ideas. The patient is not nervous/anxious and is not hyperactive.         Objective      Physical Exam  /80 Comment: has not taken BP meds today  Ht 149.9 cm (59.02\")   Wt 50.3 kg (111 lb)   BMI 22.41 kg/m²     Body mass index is 22.41 kg/m².    General:   Mental Status:  Alert   Appearance: Cooperative, in no acute distress   Build and Nutrition: Well-nourished well-developed female   Orientation: Alert and oriented to person, place and time   Posture: Normal   Gait: Limp on the right    Integument:              Right knee: No skin lesions, no rash, no ecchymosis     Lower Extremities:              Right Knee:                          Tenderness:    Mild medial and lateral joint line tenderness                          Effusion: "          None                          Swelling:          None                          Crepitus:          Positive                          Range of motion:        Extension:       0°                                                              Flexion:           125°  Instability:        No varus laxity, no valgus laxity, negative anterior drawer  Deformities:     None    Imaging/Studies  Imaging Results (Last 24 Hours)     ** No results found for the last 24 hours. **        No new imaging today.    Assessment and Plan     Diagnoses and all orders for this visit:    1. Primary osteoarthritis of right knee (Primary)  -     Large Joint Arthrocentesis        1. Primary osteoarthritis of right knee        I reviewed my findings with the patient.  She would like a repeat viscosupplementation injection for her right knee, and we will submit for approval.  I will see her back once the injections are ready for administration.    Return for After approval of the visco injection.      Jhony Griffin MD  01/30/23  14:26 EST

## 2023-03-15 ENCOUNTER — CLINICAL SUPPORT (OUTPATIENT)
Dept: ORTHOPEDIC SURGERY | Facility: CLINIC | Age: 83
End: 2023-03-15
Payer: MEDICARE

## 2023-03-15 DIAGNOSIS — M17.11 PRIMARY OSTEOARTHRITIS OF RIGHT KNEE: Primary | ICD-10-CM

## 2023-03-15 PROCEDURE — 20610 DRAIN/INJ JOINT/BURSA W/O US: CPT | Performed by: ORTHOPAEDIC SURGERY

## 2023-03-15 NOTE — PROGRESS NOTES
[facility]    Subjective     Chief Complaint   Patient presents with   • Injections     Right knee orthovisc #1 injection         HPI    Danay Gonzales is a 82 y.o. female who presents for the first Orthovisc injection into the right knee.    Patient Active Problem List   Diagnosis   • PVD (peripheral vascular disease) (HCC)   • Lumbar stenosis with neurogenic claudication   • Spondylosis of lumbar region without myelopathy or radiculopathy   • Sacroiliac joint dysfunction of left side   • Gait disturbance   • At high risk for falls   • Degeneration of lumbar or lumbosacral intervertebral disc   • Anemia with chronic illness     Past Medical History:   Diagnosis Date   • Anemia    • Arthritis    • Cancer (HCC)     skin   • Chronic constipation    • Diabetes mellitus (HCC)     borderline - no medications   • Disease of thyroid gland    • GERD (gastroesophageal reflux disease)    • Glaucoma    • Headache    • Hearing aid worn    • History of bronchitis    • History of migraine    • History of pneumonia    • Hypertension    • Kidney disease    • Low back pain    • Lumbar stenosis    • Menopause    • Mumps    • Osteoporosis    • PVD (peripheral vascular disease) (HCC)    • Stroke (HCC)     right hand weakness    • Vertigo    • Wears glasses       Past Surgical History:   Procedure Laterality Date   • BREAST BIOPSY Bilateral     BOTH BREASTS US BX, PT DOES NOT REMEMBER WHEN   • CATARACT EXTRACTION, BILATERAL     • COLONOSCOPY  10/2016   • HYSTERECTOMY  1981    Complete   • LUMBAR LAMINECTOMY DISCECTOMY DECOMPRESSION N/A 11/24/2020    Procedure: LUMBAR LAMINECTOMY L3- S1;  Surgeon: Jae Graves MD;  Location: [facility];  Service: Neurosurgery;  Laterality: N/A;   • OOPHORECTOMY      Comnplete Hysterectomy   • OTHER SURGICAL HISTORY  1999    Scalp   • SKIN CANCER EXCISION Left 2005    Hand   • TONSILLECTOMY  1944   • UPPER GASTROINTESTINAL ENDOSCOPY  10/2016    Esophageal Stretching       Family History   Problem Relation Age of Onset   • Heart disease Mother    • Heart attack Mother    • Stroke Mother    • Lung disease Father    • Heart disease Sister    • Cancer Sister    • Heart disease Brother    • Stroke Brother    • Cancer Sister    • Cancer Brother    • Stroke Brother    • Heart disease Brother    • Heart disease Brother    • Lung disease Sister    • COPD Sister    • Emphysema Sister    • Breast cancer Neg Hx    • Ovarian cancer Neg Hx      Social History     Socioeconomic History   • Marital status:    Tobacco Use   • Smoking status: Never   • Smokeless tobacco: Never   Vaping Use   • Vaping Use: Never used   Substance and Sexual Activity   • Alcohol use: No   • Drug use: No   • Sexual activity: Defer      Current Outpatient Medications on File Prior to Visit   Medication Sig Dispense Refill   • Accu-Chek FastClix Lancets misc      • acetaminophen (TYLENOL) 500 MG tablet Take 1 tablet by mouth Every 6 (Six) Hours As Needed.     • aspirin  MG tablet Take 1 tablet by mouth Daily. Patient states Dr. Graves told her not to stop prior to surgery     • calcium carbonate (OS-YASMANY) 600 MG tablet Take 1 tablet by mouth Daily.     • Cholecalciferol (VITAMIN D3) 125 MCG (5000 UT) capsule capsule Take 1 capsule by mouth Daily.     • gabapentin (NEURONTIN) 100 MG capsule Take 1 capsule by mouth Every Night. 30 capsule 0   • Hydroxychloroquine Sulfate 100 MG tablet Take  by mouth.     • hyoscyamine (LEVSIN) 0.125 MG SL tablet      • levothyroxine (SYNTHROID, LEVOTHROID) 75 MCG tablet Take  by mouth Daily.     • magnesium gluconate (MAGONATE) 500 MG tablet Take 500 mg by mouth Daily.     • Multiple Vitamin (MULTI-VITAMIN DAILY PO) Take 1 tablet by mouth Daily.     • niacin 500 MG tablet Take  by mouth Daily.     • omeprazole (priLOSEC) 40 MG capsule      • ondansetron (ZOFRAN) 4 MG tablet Take 1 tablet by mouth Every 8 (Eight) Hours As Needed for Nausea or Vomiting.     • ondansetron (ZOFRAN)  8 MG tablet      • potassium phosphate, monobasic, (K-PHOS) 500 MG tablet Take 1 tablet by mouth Daily.     • rosuvastatin (CRESTOR) 40 MG tablet Take  by mouth Daily.     • SALINE MIST 0.65 % nasal spray      • triamterene-hydrochlorothiazide (MAXZIDE-25) 37.5-25 MG per tablet        No current facility-administered medications on file prior to visit.      Allergies   Allergen Reactions   • Erythromycin Rash        Review of Systems   Constitutional: Negative for activity change, appetite change, chills, diaphoresis, fatigue, fever and unexpected weight change.   HENT: Negative for congestion, dental problem, drooling, ear discharge, ear pain, facial swelling, hearing loss, mouth sores, nosebleeds, postnasal drip, rhinorrhea, sinus pressure, sneezing, sore throat, tinnitus, trouble swallowing and voice change.    Eyes: Negative for photophobia, pain, discharge, redness, itching and visual disturbance.   Respiratory: Negative for apnea, cough, choking, chest tightness, shortness of breath, wheezing and stridor.    Cardiovascular: Negative for chest pain, palpitations and leg swelling.   Gastrointestinal: Negative for abdominal distention, abdominal pain, anal bleeding, blood in stool, constipation, diarrhea, nausea, rectal pain and vomiting.   Endocrine: Negative for cold intolerance, heat intolerance, polydipsia, polyphagia and polyuria.   Genitourinary: Negative for decreased urine volume, difficulty urinating, dysuria, enuresis, flank pain, frequency, genital sores, hematuria and urgency.   Musculoskeletal: Positive for arthralgias. Negative for back pain, gait problem, joint swelling, myalgias, neck pain and neck stiffness.   Skin: Negative for color change, pallor, rash and wound.   Allergic/Immunologic: Negative for environmental allergies, food allergies and immunocompromised state.   Neurological: Negative for dizziness, tremors, seizures, syncope, facial asymmetry, speech difficulty, weakness,  light-headedness, numbness and headaches.   Hematological: Negative for adenopathy. Does not bruise/bleed easily.   Psychiatric/Behavioral: Negative for agitation, behavioral problems, confusion, decreased concentration, dysphoric mood, hallucinations, self-injury, sleep disturbance and suicidal ideas. The patient is not nervous/anxious and is not hyperactive.         Objective      Physical Exam  There were no vitals taken for this visit.    There is no height or weight on file to calculate BMI.    General:   Mental Status:  Alert   Appearance: Cooperative, in no acute distress   Posture: Normal    Assessment and Plan     Diagnoses and all orders for this visit:    1. Primary osteoarthritis of right knee (Primary)  -     Large Joint Arthrocentesis        1. Primary osteoarthritis of right knee        Procedure Note:  The potential benefits of performing a therapeutic knee joint visco supplementation injection, as well as potential risks (including, but not limited to infection, swelling, pain, bleeding, bruising, nerve/blood vessel damage, and pseudoseptic reaction) have been discussed with the patient.  After informed consent, timeout procedure was performed, and the skin on the right knee was prepped with chlorhexidine soap and alcohol, after which ethyl chloride was applied to the skin at the injection site. Via the anterolateral approach, Orthovisc was injected into the knee joint.  The patient tolerated the procedure well. There were no complications.  Band-Aid was applied to the injection site. Post-procedural instructions discussed with the patient and/or their caregiver.    Return in about 1 week (around 3/22/2023) for Injection.    Jhony Griffin MD  03/15/23  14:52 EDT

## 2023-03-15 NOTE — PROGRESS NOTES
Procedure   Large Joint Arthrocentesis  Date/Time: 3/15/2023 2:39 PM  Consent given by: patient  Site marked: site marked  Timeout: Immediately prior to procedure a time out was called to verify the correct patient, procedure, equipment, support staff and site/side marked as required   Supporting Documentation  Indications: pain   Procedure Details  Location: knee -   Preparation: Patient was prepped and draped in the usual sterile fashion  Needle size: 22 G  Approach: anterolateral  Medications administered: 30 mg Hyaluronan 30 MG/2ML  Patient tolerance: patient tolerated the procedure well with no immediate complications

## 2023-03-22 ENCOUNTER — CLINICAL SUPPORT (OUTPATIENT)
Dept: ORTHOPEDIC SURGERY | Facility: CLINIC | Age: 83
End: 2023-03-22
Payer: MEDICARE

## 2023-03-22 DIAGNOSIS — M17.11 PRIMARY OSTEOARTHRITIS OF RIGHT KNEE: Primary | ICD-10-CM

## 2023-03-22 PROCEDURE — 20610 DRAIN/INJ JOINT/BURSA W/O US: CPT | Performed by: ORTHOPAEDIC SURGERY

## 2023-03-22 NOTE — PROGRESS NOTES
Procedure   - Large Joint Arthrocentesis: R knee on 3/22/2023 2:28 PM  Indications: pain  Details: 22 G needle, anterolateral approach  Medications: 30 mg Hyaluronan 30 MG/2ML  Outcome: tolerated well, no immediate complications  Procedure, treatment alternatives, risks and benefits explained, specific risks discussed. Consent was given by the patient. Immediately prior to procedure a time out was called to verify the correct patient, procedure, equipment, support staff and site/side marked as required. Patient was prepped and draped in the usual sterile fashion.

## 2023-03-22 NOTE — PROGRESS NOTES
[facility] [place] [facility] [place]    Subjective     Chief Complaint   Patient presents with   • Injections     Right knee orthovisc #2        HPI    Danay Gonzales is a 82 y.o. female who presents for the second Orthovisc injection into the right knee.  Of note, she has seen some improvement so far.    Patient Active Problem List   Diagnosis   • PVD (peripheral vascular disease) (Formerly [facility])   • Lumbar stenosis with neurogenic claudication   • Spondylosis of lumbar region without myelopathy or radiculopathy   • Sacroiliac joint dysfunction of left side   • Gait disturbance   • At high risk for falls   • Degeneration of lumbar or lumbosacral intervertebral disc   • Anemia with chronic illness     Past Medical History:   Diagnosis Date   • Anemia    • Arthritis    • Cancer (HCC)     skin   • Chronic constipation    • Diabetes mellitus (HCC)     borderline - no medications   • Disease of thyroid gland    • GERD (gastroesophageal reflux disease)    • Glaucoma    • Headache    • Hearing aid worn    • History of bronchitis    • History of migraine    • History of pneumonia    • Hypertension    • Kidney disease    • Low back pain    • Lumbar stenosis    • Menopause    • Mumps    • Osteoporosis    • PVD (peripheral vascular disease) (HCC)    • Stroke (HCC)     right hand weakness    • Vertigo    • Wears glasses       Past Surgical History:   Procedure Laterality Date   • BREAST BIOPSY Bilateral     BOTH BREASTS US BX, PT DOES NOT REMEMBER WHEN   • CATARACT EXTRACTION, BILATERAL     • COLONOSCOPY  10/2016   • HYSTERECTOMY  1981    Complete   • LUMBAR LAMINECTOMY DISCECTOMY DECOMPRESSION N/A 11/24/2020    Procedure: LUMBAR LAMINECTOMY L3- S1;  Surgeon: Jae Graves MD;  Location: [facility];  Service: Neurosurgery;  Laterality: N/A;   • OOPHORECTOMY      Comnplete Hysterectomy   • OTHER SURGICAL HISTORY  1999    Scalp   • SKIN CANCER EXCISION Left 2005    Hand   • TONSILLECTOMY  1944   • UPPER GASTROINTESTINAL ENDOSCOPY   10/2016    Esophageal Stretching      Family History   Problem Relation Age of Onset   • Heart disease Mother    • Heart attack Mother    • Stroke Mother    • Lung disease Father    • Heart disease Sister    • Cancer Sister    • Heart disease Brother    • Stroke Brother    • Cancer Sister    • Cancer Brother    • Stroke Brother    • Heart disease Brother    • Heart disease Brother    • Lung disease Sister    • COPD Sister    • Emphysema Sister    • Breast cancer Neg Hx    • Ovarian cancer Neg Hx      Social History     Socioeconomic History   • Marital status:    Tobacco Use   • Smoking status: Never   • Smokeless tobacco: Never   Vaping Use   • Vaping Use: Never used   Substance and Sexual Activity   • Alcohol use: No   • Drug use: No   • Sexual activity: Defer      Current Outpatient Medications on File Prior to Visit   Medication Sig Dispense Refill   • Accu-Chek FastClix Lancets misc      • acetaminophen (TYLENOL) 500 MG tablet Take 1 tablet by mouth Every 6 (Six) Hours As Needed.     • aspirin  MG tablet Take 1 tablet by mouth Daily. Patient states Dr. Graves told her not to stop prior to surgery     • calcium carbonate (OS-YASMANY) 600 MG tablet Take 1 tablet by mouth Daily.     • Cholecalciferol (VITAMIN D3) 125 MCG (5000 UT) capsule capsule Take 1 capsule by mouth Daily.     • gabapentin (NEURONTIN) 100 MG capsule Take 1 capsule by mouth Every Night. 30 capsule 0   • Hydroxychloroquine Sulfate 100 MG tablet Take  by mouth.     • hyoscyamine (LEVSIN) 0.125 MG SL tablet      • levothyroxine (SYNTHROID, LEVOTHROID) 75 MCG tablet Take  by mouth Daily.     • magnesium gluconate (MAGONATE) 500 MG tablet Take 500 mg by mouth Daily.     • Multiple Vitamin (MULTI-VITAMIN DAILY PO) Take 1 tablet by mouth Daily.     • niacin 500 MG tablet Take  by mouth Daily.     • omeprazole (priLOSEC) 40 MG capsule      • ondansetron (ZOFRAN) 4 MG tablet Take 1 tablet by mouth Every 8 (Eight) Hours As Needed for Nausea or  Vomiting.     • ondansetron (ZOFRAN) 8 MG tablet      • potassium phosphate, monobasic, (K-PHOS) 500 MG tablet Take 1 tablet by mouth Daily.     • rosuvastatin (CRESTOR) 40 MG tablet Take  by mouth Daily.     • SALINE MIST 0.65 % nasal spray      • triamterene-hydrochlorothiazide (MAXZIDE-25) 37.5-25 MG per tablet        No current facility-administered medications on file prior to visit.      Allergies   Allergen Reactions   • Erythromycin Rash        Review of Systems   Constitutional: Negative for activity change, appetite change, chills, diaphoresis, fatigue, fever and unexpected weight change.   HENT: Negative for congestion, dental problem, drooling, ear discharge, ear pain, facial swelling, hearing loss, mouth sores, nosebleeds, postnasal drip, rhinorrhea, sinus pressure, sneezing, sore throat, tinnitus, trouble swallowing and voice change.    Eyes: Negative for photophobia, pain, discharge, redness, itching and visual disturbance.   Respiratory: Negative for apnea, cough, choking, chest tightness, shortness of breath, wheezing and stridor.    Cardiovascular: Negative for chest pain, palpitations and leg swelling.   Gastrointestinal: Negative for abdominal distention, abdominal pain, anal bleeding, blood in stool, constipation, diarrhea, nausea, rectal pain and vomiting.   Endocrine: Negative for cold intolerance, heat intolerance, polydipsia, polyphagia and polyuria.   Genitourinary: Negative for decreased urine volume, difficulty urinating, dysuria, enuresis, flank pain, frequency, genital sores, hematuria and urgency.   Musculoskeletal: Positive for arthralgias. Negative for back pain, gait problem, joint swelling, myalgias, neck pain and neck stiffness.   Skin: Negative for color change, pallor, rash and wound.   Allergic/Immunologic: Negative for environmental allergies, food allergies and immunocompromised state.   Neurological: Negative for dizziness, tremors, seizures, syncope, facial asymmetry,  speech difficulty, weakness, light-headedness, numbness and headaches.   Hematological: Negative for adenopathy. Does not bruise/bleed easily.   Psychiatric/Behavioral: Negative for agitation, behavioral problems, confusion, decreased concentration, dysphoric mood, hallucinations, self-injury, sleep disturbance and suicidal ideas. The patient is not nervous/anxious and is not hyperactive.         Objective      Physical Exam  There were no vitals taken for this visit.    There is no height or weight on file to calculate BMI.    General:   Mental Status:  Alert   Appearance: Cooperative, in no acute distress   Posture: Normal    Assessment and Plan     Diagnoses and all orders for this visit:    1. Primary osteoarthritis of right knee (Primary)  -     - Large Joint Arthrocentesis: R knee        1. Primary osteoarthritis of right knee        Procedure Note:  The potential benefits of performing a therapeutic knee joint visco supplementation injection, as well as potential risks (including, but not limited to infection, swelling, pain, bleeding, bruising, nerve/blood vessel damage, and pseudoseptic reaction) have been discussed with the patient.  After informed consent, timeout procedure was performed, and the skin on the right knee was prepped with chlorhexidine soap and alcohol, after which ethyl chloride was applied to the skin at the injection site. Via the anterolateral approach, Orthovisc was injected into the knee joint.  The patient tolerated the procedure well. There were no complications.  Band-Aid was applied to the injection site. Post-procedural instructions discussed with the patient and/or their caregiver.    Return in about 1 week (around 3/29/2023) for Injection.    Jhony Griffin MD  03/22/23  14:50 EDT

## 2023-03-24 ENCOUNTER — HOSPITAL ENCOUNTER (INPATIENT)
Facility: HOSPITAL | Age: 83
LOS: 2 days | Discharge: HOME | DRG: 440 | End: 2023-03-26
Attending: EMERGENCY MEDICINE | Admitting: INTERNAL MEDICINE
Payer: MEDICARE

## 2023-03-24 ENCOUNTER — APPOINTMENT (OUTPATIENT)
Dept: CT IMAGING | Facility: HOSPITAL | Age: 83
DRG: 440 | End: 2023-03-24
Payer: MEDICARE

## 2023-03-24 DIAGNOSIS — K85.90 ACUTE PANCREATITIS, UNSPECIFIED COMPLICATION STATUS, UNSPECIFIED PANCREATITIS TYPE: Primary | ICD-10-CM

## 2023-03-24 DIAGNOSIS — R10.10 ACUTE UPPER ABDOMINAL PAIN: ICD-10-CM

## 2023-03-24 DIAGNOSIS — R11.2 NAUSEA AND VOMITING, UNSPECIFIED VOMITING TYPE: ICD-10-CM

## 2023-03-24 PROBLEM — E11.9 TYPE 2 DIABETES MELLITUS: Status: ACTIVE | Noted: 2023-03-24

## 2023-03-24 PROBLEM — I10 ESSENTIAL HYPERTENSION: Status: ACTIVE | Noted: 2023-03-24

## 2023-03-24 PROBLEM — K21.9 GERD WITHOUT ESOPHAGITIS: Status: ACTIVE | Noted: 2023-03-24

## 2023-03-24 PROBLEM — N18.30 CKD (CHRONIC KIDNEY DISEASE) STAGE 3, GFR 30-59 ML/MIN: Status: ACTIVE | Noted: 2023-03-24

## 2023-03-24 PROBLEM — E78.5 HYPERLIPIDEMIA: Status: ACTIVE | Noted: 2023-03-24

## 2023-03-24 PROBLEM — E03.9 HYPOTHYROIDISM: Status: ACTIVE | Noted: 2023-03-24

## 2023-03-24 LAB
ALBUMIN SERPL-MCNC: 4.1 G/DL (ref 3.5–5.2)
ALBUMIN/GLOB SERPL: 1.3 G/DL
ALP SERPL-CCNC: 97 U/L (ref 39–117)
ALT SERPL W P-5'-P-CCNC: 12 U/L (ref 1–33)
ANION GAP SERPL CALCULATED.3IONS-SCNC: 10 MMOL/L (ref 5–15)
AST SERPL-CCNC: 23 U/L (ref 1–32)
BACTERIA UR QL AUTO: ABNORMAL /HPF
BASOPHILS # BLD AUTO: 0.03 10*3/MM3 (ref 0–0.2)
BASOPHILS NFR BLD AUTO: 0.3 % (ref 0–1.5)
BILIRUB SERPL-MCNC: 0.5 MG/DL (ref 0–1.2)
BILIRUB UR QL STRIP: NEGATIVE
BUN SERPL-MCNC: 30 MG/DL (ref 8–23)
BUN/CREAT SERPL: 17.4 (ref 7–25)
CALCIUM SPEC-SCNC: 9.1 MG/DL (ref 8.6–10.5)
CHLORIDE SERPL-SCNC: 106 MMOL/L (ref 98–107)
CHOLEST SERPL-MCNC: 124 MG/DL (ref 0–200)
CLARITY UR: CLEAR
CO2 SERPL-SCNC: 25 MMOL/L (ref 22–29)
COLOR UR: YELLOW
CREAT SERPL-MCNC: 1.72 MG/DL (ref 0.57–1)
D-LACTATE SERPL-SCNC: 0.7 MMOL/L (ref 0.5–2)
DEPRECATED RDW RBC AUTO: 46.5 FL (ref 37–54)
EGFRCR SERPLBLD CKD-EPI 2021: 29.2 ML/MIN/1.73
EOSINOPHIL # BLD AUTO: 0 10*3/MM3 (ref 0–0.4)
EOSINOPHIL NFR BLD AUTO: 0 % (ref 0.3–6.2)
ERYTHROCYTE [DISTWIDTH] IN BLOOD BY AUTOMATED COUNT: 13.5 % (ref 12.3–15.4)
GLOBULIN UR ELPH-MCNC: 3.2 GM/DL
GLUCOSE SERPL-MCNC: 107 MG/DL (ref 65–99)
GLUCOSE UR STRIP-MCNC: NEGATIVE MG/DL
HBA1C MFR BLD: 6.6 % (ref 4.8–5.6)
HCT VFR BLD AUTO: 34.8 % (ref 34–46.6)
HDLC SERPL-MCNC: 35 MG/DL (ref 40–60)
HGB BLD-MCNC: 10.9 G/DL (ref 12–15.9)
HGB UR QL STRIP.AUTO: NEGATIVE
HOLD SPECIMEN: NORMAL
HYALINE CASTS UR QL AUTO: ABNORMAL /LPF
IMM GRANULOCYTES # BLD AUTO: 0.06 10*3/MM3 (ref 0–0.05)
IMM GRANULOCYTES NFR BLD AUTO: 0.5 % (ref 0–0.5)
KETONES UR QL STRIP: NEGATIVE
LDLC SERPL CALC-MCNC: 59 MG/DL (ref 0–100)
LDLC/HDLC SERPL: 1.54 {RATIO}
LEUKOCYTE ESTERASE UR QL STRIP.AUTO: ABNORMAL
LIPASE SERPL-CCNC: >3000 U/L (ref 13–60)
LYMPHOCYTES # BLD AUTO: 1.47 10*3/MM3 (ref 0.7–3.1)
LYMPHOCYTES NFR BLD AUTO: 12.4 % (ref 19.6–45.3)
MCH RBC QN AUTO: 29.1 PG (ref 26.6–33)
MCHC RBC AUTO-ENTMCNC: 31.3 G/DL (ref 31.5–35.7)
MCV RBC AUTO: 92.8 FL (ref 79–97)
MONOCYTES # BLD AUTO: 1.11 10*3/MM3 (ref 0.1–0.9)
MONOCYTES NFR BLD AUTO: 9.3 % (ref 5–12)
NEUTROPHILS NFR BLD AUTO: 77.5 % (ref 42.7–76)
NEUTROPHILS NFR BLD AUTO: 9.23 10*3/MM3 (ref 1.7–7)
NITRITE UR QL STRIP: NEGATIVE
NRBC BLD AUTO-RTO: 0 /100 WBC (ref 0–0.2)
PH UR STRIP.AUTO: <=5 [PH] (ref 5–8)
PLATELET # BLD AUTO: 278 10*3/MM3 (ref 140–450)
PMV BLD AUTO: 10.5 FL (ref 6–12)
POTASSIUM SERPL-SCNC: 4.2 MMOL/L (ref 3.5–5.2)
PROCALCITONIN SERPL-MCNC: 0.18 NG/ML (ref 0–0.25)
PROT SERPL-MCNC: 7.3 G/DL (ref 6–8.5)
PROT UR QL STRIP: ABNORMAL
RBC # BLD AUTO: 3.75 10*6/MM3 (ref 3.77–5.28)
RBC # UR STRIP: ABNORMAL /HPF
REF LAB TEST METHOD: ABNORMAL
SODIUM SERPL-SCNC: 141 MMOL/L (ref 136–145)
SP GR UR STRIP: 1.03 (ref 1–1.03)
SQUAMOUS #/AREA URNS HPF: ABNORMAL /HPF
TRIGL SERPL-MCNC: 176 MG/DL (ref 0–150)
UROBILINOGEN UR QL STRIP: ABNORMAL
VLDLC SERPL-MCNC: 30 MG/DL (ref 5–40)
WBC # UR STRIP: ABNORMAL /HPF
WBC NRBC COR # BLD: 11.9 10*3/MM3 (ref 3.4–10.8)
WHOLE BLOOD HOLD COAG: NORMAL
WHOLE BLOOD HOLD SPECIMEN: NORMAL

## 2023-03-24 PROCEDURE — 80053 COMPREHEN METABOLIC PANEL: CPT | Performed by: EMERGENCY MEDICINE

## 2023-03-24 PROCEDURE — 99222 1ST HOSP IP/OBS MODERATE 55: CPT | Performed by: PHYSICIAN ASSISTANT

## 2023-03-24 PROCEDURE — 83690 ASSAY OF LIPASE: CPT | Performed by: EMERGENCY MEDICINE

## 2023-03-24 PROCEDURE — 83036 HEMOGLOBIN GLYCOSYLATED A1C: CPT | Performed by: PHYSICIAN ASSISTANT

## 2023-03-24 PROCEDURE — 85025 COMPLETE CBC W/AUTO DIFF WBC: CPT | Performed by: EMERGENCY MEDICINE

## 2023-03-24 PROCEDURE — 25010000002 ONDANSETRON PER 1 MG: Performed by: EMERGENCY MEDICINE

## 2023-03-24 PROCEDURE — 36415 COLL VENOUS BLD VENIPUNCTURE: CPT

## 2023-03-24 PROCEDURE — 25010000002 MORPHINE PER 10 MG: Performed by: EMERGENCY MEDICINE

## 2023-03-24 PROCEDURE — 83605 ASSAY OF LACTIC ACID: CPT | Performed by: EMERGENCY MEDICINE

## 2023-03-24 PROCEDURE — 80061 LIPID PANEL: CPT | Performed by: HOSPITALIST

## 2023-03-24 PROCEDURE — 74176 CT ABD & PELVIS W/O CONTRAST: CPT

## 2023-03-24 PROCEDURE — 99285 EMERGENCY DEPT VISIT HI MDM: CPT

## 2023-03-24 PROCEDURE — 84145 PROCALCITONIN (PCT): CPT | Performed by: PHYSICIAN ASSISTANT

## 2023-03-24 PROCEDURE — 81001 URINALYSIS AUTO W/SCOPE: CPT | Performed by: EMERGENCY MEDICINE

## 2023-03-24 RX ORDER — ACETAMINOPHEN 325 MG/1
650 TABLET ORAL EVERY 4 HOURS PRN
Status: DISCONTINUED | OUTPATIENT
Start: 2023-03-24 | End: 2023-03-26 | Stop reason: HOSPADM

## 2023-03-24 RX ORDER — MORPHINE SULFATE 2 MG/ML
2 INJECTION, SOLUTION INTRAMUSCULAR; INTRAVENOUS ONCE
Status: COMPLETED | OUTPATIENT
Start: 2023-03-24 | End: 2023-03-24

## 2023-03-24 RX ORDER — NICOTINE POLACRILEX 4 MG
15 LOZENGE BUCCAL
Status: DISCONTINUED | OUTPATIENT
Start: 2023-03-24 | End: 2023-03-26 | Stop reason: HOSPADM

## 2023-03-24 RX ORDER — CHOLECALCIFEROL (VITAMIN D3) 125 MCG
5 CAPSULE ORAL NIGHTLY PRN
Status: DISCONTINUED | OUTPATIENT
Start: 2023-03-24 | End: 2023-03-26 | Stop reason: HOSPADM

## 2023-03-24 RX ORDER — IBUPROFEN 600 MG/1
1 TABLET ORAL
Status: DISCONTINUED | OUTPATIENT
Start: 2023-03-24 | End: 2023-03-26 | Stop reason: HOSPADM

## 2023-03-24 RX ORDER — SODIUM CHLORIDE 9 MG/ML
40 INJECTION, SOLUTION INTRAVENOUS AS NEEDED
Status: DISCONTINUED | OUTPATIENT
Start: 2023-03-24 | End: 2023-03-26 | Stop reason: HOSPADM

## 2023-03-24 RX ORDER — PANTOPRAZOLE SODIUM 40 MG/1
40 TABLET, DELAYED RELEASE ORAL
Status: DISCONTINUED | OUTPATIENT
Start: 2023-03-25 | End: 2023-03-26 | Stop reason: HOSPADM

## 2023-03-24 RX ORDER — ONDANSETRON 4 MG/1
4 TABLET, FILM COATED ORAL EVERY 6 HOURS PRN
Status: DISCONTINUED | OUTPATIENT
Start: 2023-03-24 | End: 2023-03-26 | Stop reason: HOSPADM

## 2023-03-24 RX ORDER — SODIUM CHLORIDE 0.9 % (FLUSH) 0.9 %
10 SYRINGE (ML) INJECTION EVERY 12 HOURS SCHEDULED
Status: DISCONTINUED | OUTPATIENT
Start: 2023-03-24 | End: 2023-03-26 | Stop reason: HOSPADM

## 2023-03-24 RX ORDER — DEXTROSE MONOHYDRATE 25 G/50ML
25 INJECTION, SOLUTION INTRAVENOUS
Status: DISCONTINUED | OUTPATIENT
Start: 2023-03-24 | End: 2023-03-26 | Stop reason: HOSPADM

## 2023-03-24 RX ORDER — SODIUM CHLORIDE 0.9 % (FLUSH) 0.9 %
10 SYRINGE (ML) INJECTION AS NEEDED
Status: DISCONTINUED | OUTPATIENT
Start: 2023-03-24 | End: 2023-03-26 | Stop reason: HOSPADM

## 2023-03-24 RX ORDER — SODIUM CHLORIDE 9 MG/ML
10 INJECTION INTRAVENOUS AS NEEDED
Status: DISCONTINUED | OUTPATIENT
Start: 2023-03-24 | End: 2023-03-26 | Stop reason: HOSPADM

## 2023-03-24 RX ORDER — ONDANSETRON 2 MG/ML
4 INJECTION INTRAMUSCULAR; INTRAVENOUS ONCE
Status: COMPLETED | OUTPATIENT
Start: 2023-03-24 | End: 2023-03-24

## 2023-03-24 RX ORDER — ASPIRIN 325 MG
325 TABLET, DELAYED RELEASE (ENTERIC COATED) ORAL DAILY
Status: DISCONTINUED | OUTPATIENT
Start: 2023-03-25 | End: 2023-03-26 | Stop reason: HOSPADM

## 2023-03-24 RX ORDER — INSULIN LISPRO 100 [IU]/ML
0-9 INJECTION, SOLUTION INTRAVENOUS; SUBCUTANEOUS
Status: DISCONTINUED | OUTPATIENT
Start: 2023-03-25 | End: 2023-03-26 | Stop reason: HOSPADM

## 2023-03-24 RX ORDER — ROSUVASTATIN CALCIUM 20 MG/1
40 TABLET, COATED ORAL DAILY
Status: DISCONTINUED | OUTPATIENT
Start: 2023-03-25 | End: 2023-03-26 | Stop reason: HOSPADM

## 2023-03-24 RX ORDER — ONDANSETRON 2 MG/ML
4 INJECTION INTRAMUSCULAR; INTRAVENOUS EVERY 6 HOURS PRN
Status: DISCONTINUED | OUTPATIENT
Start: 2023-03-24 | End: 2023-03-26 | Stop reason: HOSPADM

## 2023-03-24 RX ORDER — SODIUM CHLORIDE 9 MG/ML
100 INJECTION, SOLUTION INTRAVENOUS CONTINUOUS
Status: DISCONTINUED | OUTPATIENT
Start: 2023-03-24 | End: 2023-03-26 | Stop reason: HOSPADM

## 2023-03-24 RX ORDER — LEVOTHYROXINE SODIUM 0.07 MG/1
75 TABLET ORAL DAILY
Status: DISCONTINUED | OUTPATIENT
Start: 2023-03-25 | End: 2023-03-26 | Stop reason: HOSPADM

## 2023-03-24 RX ORDER — HEPARIN SODIUM 5000 [USP'U]/ML
5000 INJECTION, SOLUTION INTRAVENOUS; SUBCUTANEOUS EVERY 8 HOURS SCHEDULED
Status: DISCONTINUED | OUTPATIENT
Start: 2023-03-25 | End: 2023-03-26 | Stop reason: HOSPADM

## 2023-03-24 RX ADMIN — MORPHINE SULFATE 2 MG: 2 INJECTION, SOLUTION INTRAMUSCULAR; INTRAVENOUS at 18:43

## 2023-03-24 RX ADMIN — Medication 10 ML: at 21:24

## 2023-03-24 RX ADMIN — ONDANSETRON 4 MG: 2 INJECTION INTRAMUSCULAR; INTRAVENOUS at 18:43

## 2023-03-24 RX ADMIN — SODIUM CHLORIDE 100 ML/HR: 9 INJECTION, SOLUTION INTRAVENOUS at 21:25

## 2023-03-24 RX ADMIN — SODIUM CHLORIDE 1000 ML: 9 INJECTION, SOLUTION INTRAVENOUS at 18:43

## 2023-03-24 NOTE — H&P
[facility]  HISTORY AND PHYSICAL    Patient Name: Danay Gonzales  : 1940  MRN: [id number]  Primary Care Physician: Mina Barnett MD  Date of admission: 3/24/2023    Subjective   Subjective     Chief Complaint:  Abdominal pain, N/V    HPI:  Danay Gonzales is a 82 y.o. female with a history of PVD, RA, Sjogren's Disease, HTN, HLD, T2DM, Hypothyroidism, and GERD who presents to [facility] ED for complaint of abdominal pain. She states this began late last night, starting off as mild, then persisting to today where it became much more severe. She also reports having no appetite today, only having a packet of crackers. Pain is localized to the LUQ and Epigastric region. She denies fever, chills, chest pain, SOB, nausea, vomiting or diarrhea. She denies tobacco use, alcohol abuse, or illicit drug use.         Review of Systems   Constitutional: Positive for appetite change. Negative for chills, fatigue, fever and unexpected weight change.   HENT: Negative for nosebleeds, sore throat and trouble swallowing.    Eyes: Negative for photophobia and visual disturbance.   Respiratory: Negative for cough, shortness of breath and wheezing.    Cardiovascular: Negative for chest pain and palpitations.   Gastrointestinal: Positive for abdominal pain. Negative for diarrhea, nausea and vomiting.   Genitourinary: Negative for dysuria and hematuria.   Musculoskeletal: Positive for arthralgias (chronic) and back pain (chronic).   Skin: Negative.    Neurological: Negative for tremors, syncope, speech difficulty and weakness.   Psychiatric/Behavioral: Negative for confusion. The patient is not nervous/anxious.           Personal History     Past Medical History:   Diagnosis Date   • Anemia    • Arthritis    • Cancer (HCC)     skin   • Chronic constipation    • Diabetes mellitus (HCC)     borderline - no medications   • Disease of thyroid gland    • GERD (gastroesophageal reflux  disease)    • Glaucoma    • Headache    • Hearing aid worn    • History of bronchitis    • History of migraine    • History of pneumonia    • Hypertension    • Kidney disease    • Low back pain    • Lumbar stenosis    • Menopause    • Mumps    • Osteoporosis    • PVD (peripheral vascular disease) (HCC)    • Stroke (HCC)     right hand weakness    • Vertigo    • Wears glasses              Past Surgical History:   Procedure Laterality Date   • BREAST BIOPSY Bilateral     BOTH BREASTS US BX, PT DOES NOT REMEMBER WHEN   • CATARACT EXTRACTION, BILATERAL     • COLONOSCOPY  10/2016   • HYSTERECTOMY  1981    Complete   • LUMBAR LAMINECTOMY DISCECTOMY DECOMPRESSION N/A 11/24/2020    Procedure: LUMBAR LAMINECTOMY L3- S1;  Surgeon: Jae Graves MD;  [facility];  Service: Neurosurgery;  Laterality: N/A;   • OOPHORECTOMY      Comnplete Hysterectomy   • OTHER SURGICAL HISTORY  1999    Scalp   • SKIN CANCER EXCISION Left 2005    Hand   • TONSILLECTOMY  1944   • UPPER GASTROINTESTINAL ENDOSCOPY  10/2016    Esophageal Stretching       Family History:  family history includes COPD in her sister; Cancer in her brother, sister, and sister; Emphysema in her sister; Heart attack in her mother; Heart disease in her brother, brother, brother, mother, and sister; Lung disease in her father and sister; Stroke in her brother, brother, and mother.     Social History:  reports that she has never smoked. She has never used smokeless tobacco. She reports that she does not drink alcohol and does not use drugs.  Social History     Social History Narrative   • Not on file       Medications:  Accu-Chek FastClix Lancets, Hydroxychloroquine Sulfate, acetaminophen, aspirin EC, calcium carbonate, gabapentin, hyoscyamine, levothyroxine, magnesium gluconate, multivitamin, niacin, omeprazole, ondansetron, potassium phosphate (monobasic), rosuvastatin, sodium chloride, triamterene-hydrochlorothiazide, and vitamin D3    Allergies   Allergen  Reactions   • Erythromycin Rash       Objective   Objective     Vital Signs:   Temp:  [98.3 °F (36.8 °C)] 98.3 °F (36.8 °C)  Heart Rate:  [77-82] 81  Resp:  [16] 16  BP: (117-154)/(44-95) 147/70    Physical Exam  Constitutional:       General: She is not in acute distress.     Appearance: Normal appearance.   HENT:      Head: Atraumatic.      Right Ear: External ear normal.      Left Ear: External ear normal.      Nose: Nose normal.   Eyes:      Extraocular Movements: Extraocular movements intact.      Conjunctiva/sclera: Conjunctivae normal.      Pupils: Pupils are equal, round, and reactive to light.   Cardiovascular:      Rate and Rhythm: Normal rate and regular rhythm.      Pulses: Normal pulses.      Heart sounds: Normal heart sounds. No murmur heard.  Pulmonary:      Effort: Pulmonary effort is normal. No respiratory distress.      Breath sounds: Normal breath sounds. No wheezing, rhonchi or rales.   Abdominal:      General: Bowel sounds are normal. There is no distension.      Tenderness: There is abdominal tenderness (LUQ and Epigrastic). There is no guarding or rebound.   Musculoskeletal:         General: Normal range of motion.      Cervical back: No rigidity.      Right lower leg: No edema.      Left lower leg: No edema.   Skin:     General: Skin is warm and dry.      Coloration: Skin is not jaundiced.      Findings: No lesion or rash.   Neurological:      General: No focal deficit present.      Mental Status: She is alert and oriented to person, place, and time.   Psychiatric:         Attention and Perception: Attention normal.         Mood and Affect: Mood normal.         Behavior: Behavior normal.         Thought Content: Thought content normal.          Result Review:  I have personally reviewed the results from the time of this admission to 3/24/2023 19:53 EDT and agree with these findings:  [x]  Laboratory list / accordion  []  Microbiology  [x]  Radiology  []  EKG/Telemetry   []   Cardiology/Vascular   []  Pathology  [x]  Old records  []  Other:    LAB RESULTS:      Lab 03/24/23  1619   WBC 11.90*   HEMOGLOBIN 10.9*   HEMATOCRIT 34.8   PLATELETS 278   NEUTROS ABS 9.23*   IMMATURE GRANS (ABS) 0.06*   LYMPHS ABS 1.47   MONOS ABS 1.11*   EOS ABS 0.00   MCV 92.8   LACTATE 0.7         Lab 03/24/23  1619   SODIUM 141   POTASSIUM 4.2   CHLORIDE 106   CO2 25.0   ANION GAP 10.0   BUN 30*   CREATININE 1.72*   EGFR 29.2*   GLUCOSE 107*   CALCIUM 9.1         Lab 03/24/23  1619   TOTAL PROTEIN 7.3   ALBUMIN 4.1   GLOBULIN 3.2   ALT (SGPT) 12   AST (SGOT) 23   BILIRUBIN 0.5   ALK PHOS 97   LIPASE >3,000*             Lab 03/24/23  1619   CHOLESTEROL 124   LDL CHOL 59   HDL CHOL 35*   TRIGLYCERIDES 176*             Brief Urine Lab Results  (Last result in the past 365 days)      Color   Clarity   Blood   Leuk Est   Nitrite   Protein   CREAT   Urine HCG        03/24/23 1711 Yellow   Clear   Negative   Moderate (2+)   Negative   Trace               Microbiology Results (last 10 days)     ** No results found for the last 240 hours. **          CT Abdomen Pelvis Without Contrast    Result Date: 3/24/2023  CT ABDOMEN PELVIS WO CONTRAST Date of Exam: 3/24/2023 5:38 PM EDT Indication: upper abd/ruq pain, eval for biliary pathology. Comparison: Right upper quadrant abdominal ultrasound 10/1/2021., CT chest 3/22/2019 Technique: Axial CT images were obtained of the abdomen and pelvis without the administration of contrast. Reconstructed coronal and sagittal images were also obtained. Automated exposure control and iterative construction methods were used. Findings: The gallbladder has an unremarkable appearance. No definite gallstones or pericholecystic fluid is identified. No intrahepatic or extra hepatic biliary ductal dilation is seen. No choledocholithiasis is evident. The liver, spleen, pancreas, adrenals, and right kidney have a normal noncontrast appearance. A nonspecific 5 mm hyperdense left renal cortical  lesion may represent a tiny hemorrhagic or proteinaceous cyst. The appendix is not visualized, but no pericecal inflammation is seen. A few sparse diverticular outpouchings are present in the colon, but there is no evidence of acute diverticulitis. The large and small bowel loops do not appear abnormally thickened, dilated, or inflamed. No free air, free fluid or pathologically enlarged nodes are identified. Hysterectomy changes are present. Rectocele is seen. Urinary bladder is decompressed but appears unremarkable. Laminectomy changes are present at L3-4, L4-5 and L5-S1. Degenerative disc and endplate changes are most notable at L2-3 through L4-5. There is lumbar dextroscoliotic curvature. No acute or suspicious osseous abnormalities are identified. Faint reticular nodular densities are seen within the left lower lobe suggesting small airways infectious/inflammatory process. A noncalcified nodule is seen in the right lower lobe just above the diaphragm measuring 4 mm, unchanged from the CT chest from 2019, in keeping with a benign finding. Small esophageal hiatal hernia is noted. Heart size is normal.     Impression: Impression: 1. No gallbladder abnormalities identified. No abnormal biliary dilation. 2. Additional chronic findings include: Stable 4 Ramos right lower lobe lung nodule since 2019, lumbar dextro scoliosis, multilevel lumbar laminectomies, hysterectomy, rectocele, sparse uncomplicated colonic diverticulosis. Electronically Signed: Lisa Cali  3/24/2023 5:48 PM EDT  Workstation ID: [id number]      Results for orders placed during the hospital encounter of 11/25/19    Adult Transthoracic Echo Complete W/ Cont if Necessary Per Protocol    Interpretation Summary  · Left ventricular systolic function is normal.  · Estimated EF appears to be in the range of 66 - 70%.  · Left ventricular diastolic dysfunction (grade I a) consistent with impaired relaxation.  · Bicuspid aortic valve with mild aortic valve  stenosis.  · Borderline dilation of the proximal ascending aorta measuring 3.5 cm, poorly visualized.      Assessment & Plan   Assessment & Plan       Pancreatitis    PVD (peripheral vascular disease) (HCC)    CKD (chronic kidney disease) stage 3, GFR 30-59 ml/min (HCC)    Type 2 diabetes mellitus (HCC)    Essential hypertension    Hyperlipidemia    Hypothyroidism    GERD without esophagitis      Danay Gonzales is a 82 y.o. female with a history of PVD, RA, Sjogren's Disease, HTN, HLD, T2DM, Hypothyroidism, and GERD who presents to [facility] ED for complaint of abdominal pain.    Acute pancreatitis  -Lipase markedly elevated today over 3000.  -CT A/P shows no gallbladder abnormalities or no abnormal biliary dilation.  -She does note a long history of issues with high triglycerides and high cholesterol.  She is currently on a statin.  -Triglycerides today elevated at 176.  -We will repeat FLP in the a.m.  -N.p.o.  -Gentle IV fluids  -Pain control    CKD, stage III  -Creatinine elevated today at 1.72.  This is somewhat elevated from baseline.  -eGFR 29.2  -Avoid use of nephrotoxic agents  -Gentle IV fluids tonight  -Repeat BMP in the a.m.    HTN  HLD  -Hold home HCTZ given MARY.  -Continue statin  -FLP in the a.m.    Type 2 diabetes mellitus  -Blood glucose 107 today.  -Check A1c  -Fingerstick achs. SSI    Hypothyroidism  -Continue home Synthroid  -Check TSH, T4    GERD  -PPI        DVT prophylaxis: Heparin subQ    CODE STATUS: Full code  Code Status (Patient has no pulse and is not breathing): CPR (Attempt to Resuscitate)  Medical Interventions (Patient has pulse or is breathing): Full Support      Expected Discharge  1-2 days      This note has been completed as part of a split-shared workflow.     Signature: Electronically signed by Madeleine Biggs PA-C, 03/24/23, 7:47 PM EDT

## 2023-03-24 NOTE — ED PROVIDER NOTES
[place]    EMERGENCY DEPARTMENT ENCOUNTER      Pt Name: Danay Gonzales  MRN: [id number]  YOB: 1940  Date of evaluation: 3/24/2023  Provider: Berhane Hernandez MD    CHIEF COMPLAINT       Chief Complaint   Patient presents with   • Abdominal Pain         HISTORY OF PRESENT ILLNESS   Danay Gonzales is a 82 y.o. female who presents to the emergency department with 1 day of progressively worse moderate severity aching upper abdominal pain radiating into the right upper quadrant with associated nausea but no vomiting, diarrhea, fever, chills, chest pain, or shortness of breath.  She has history of hysterectomy but no previous abdominal surgeries.  She denies any obvious inciting or modifying factors.      Nursing notes were reviewed.    REVIEW OF SYSTEMS     ROS:  A chief complaint appropriate review of systems was completed and is negative except as noted in the HPI.      PAST MEDICAL HISTORY     Past Medical History:   Diagnosis Date   • Anemia    • Arthritis    • Cancer (HCC)     skin   • Chronic constipation    • Diabetes mellitus (HCC)     borderline - no medications   • Disease of thyroid gland    • GERD (gastroesophageal reflux disease)    • Glaucoma    • Headache    • Hearing aid worn    • History of bronchitis    • History of migraine    • History of pneumonia    • Hypertension    • Kidney disease    • Low back pain    • Lumbar stenosis    • Menopause    • Mumps    • Osteoporosis    • PVD (peripheral vascular disease) (HCC)    • Stroke (HCC)     right hand weakness    • Vertigo    • Wears glasses          SURGICAL HISTORY       Past Surgical History:   Procedure Laterality Date   • BREAST BIOPSY Bilateral     BOTH BREASTS US BX, PT DOES NOT REMEMBER WHEN   • CATARACT EXTRACTION, BILATERAL     • COLONOSCOPY  10/2016   • HYSTERECTOMY  1981    Complete   • LUMBAR LAMINECTOMY DISCECTOMY DECOMPRESSION N/A 11/24/2020    Procedure: LUMBAR LAMINECTOMY L3- S1;  Surgeon: Jae Graves MD;  Location:   [facility] OR;  Service: Neurosurgery;  Laterality: N/A;   • OOPHORECTOMY      Comnplete Hysterectomy   • OTHER SURGICAL HISTORY  1999    Scalp   • SKIN CANCER EXCISION Left 2005    Hand   • TONSILLECTOMY  1944   • UPPER GASTROINTESTINAL ENDOSCOPY  10/2016    Esophageal Stretching         CURRENT MEDICATIONS       Current Facility-Administered Medications:   •  Sodium Chloride (PF) 0.9 % 10 mL, 10 mL, Intravenous, PRN, Berhane Hernandez MD    Current Outpatient Medications:   •  Accu-Chek FastClix Lancets misc, , Disp: , Rfl:   •  acetaminophen (TYLENOL) 500 MG tablet, Take 1 tablet by mouth Every 6 (Six) Hours As Needed., Disp: , Rfl:   •  aspirin  MG tablet, Take 1 tablet by mouth Daily. Patient states Dr. Graves told her not to stop prior to surgery, Disp: , Rfl:   •  calcium carbonate (OS-YASMANY) 600 MG tablet, Take 1 tablet by mouth Daily., Disp: , Rfl:   •  Cholecalciferol (VITAMIN D3) 125 MCG (5000 UT) capsule capsule, Take 1 capsule by mouth Daily., Disp: , Rfl:   •  gabapentin (NEURONTIN) 100 MG capsule, Take 1 capsule by mouth Every Night., Disp: 30 capsule, Rfl: 0  •  Hydroxychloroquine Sulfate 100 MG tablet, Take  by mouth., Disp: , Rfl:   •  hyoscyamine (LEVSIN) 0.125 MG SL tablet, , Disp: , Rfl:   •  levothyroxine (SYNTHROID, LEVOTHROID) 75 MCG tablet, Take  by mouth Daily., Disp: , Rfl:   •  magnesium gluconate (MAGONATE) 500 MG tablet, Take 500 mg by mouth Daily., Disp: , Rfl:   •  Multiple Vitamin (MULTI-VITAMIN DAILY PO), Take 1 tablet by mouth Daily., Disp: , Rfl:   •  niacin 500 MG tablet, Take  by mouth Daily., Disp: , Rfl:   •  omeprazole (priLOSEC) 40 MG capsule, , Disp: , Rfl:   •  ondansetron (ZOFRAN) 4 MG tablet, Take 1 tablet by mouth Every 8 (Eight) Hours As Needed for Nausea or Vomiting., Disp: , Rfl:   •  ondansetron (ZOFRAN) 8 MG tablet, , Disp: , Rfl:   •  potassium phosphate, monobasic, (K-PHOS) 500 MG tablet, Take 1 tablet by mouth Daily., Disp: , Rfl:   •  rosuvastatin (CRESTOR) 40  "MG tablet, Take  by mouth Daily., Disp: , Rfl:   •  SALINE MIST 0.65 % nasal spray, , Disp: , Rfl:   •  triamterene-hydrochlorothiazide (MAXZIDE-25) 37.5-25 MG per tablet, , Disp: , Rfl:     ALLERGIES     Erythromycin    FAMILY HISTORY       Family History   Problem Relation Age of Onset   • Heart disease Mother    • Heart attack Mother    • Stroke Mother    • Lung disease Father    • Heart disease Sister    • Cancer Sister    • Heart disease Brother    • Stroke Brother    • Cancer Sister    • Cancer Brother    • Stroke Brother    • Heart disease Brother    • Heart disease Brother    • Lung disease Sister    • COPD Sister    • Emphysema Sister    • Breast cancer Neg Hx    • Ovarian cancer Neg Hx           SOCIAL HISTORY       Social History     Socioeconomic History   • Marital status:    Tobacco Use   • Smoking status: Never   • Smokeless tobacco: Never   Vaping Use   • Vaping Use: Never used   Substance and Sexual Activity   • Alcohol use: No   • Drug use: No   • Sexual activity: Defer         PHYSICAL EXAM    (up to 7 for level 4, 8 or more for level 5)     Vitals:    03/24/23 1530   BP: 134/78   BP Location: Left arm   Patient Position: Sitting   Pulse: 78   Resp: 16   Temp: 98.3 °F (36.8 °C)   TempSrc: Oral   SpO2: 97%   Weight: 51.6 kg (113 lb 12.8 oz)   Height: 147.3 cm (58\")       General: Awake, alert, no acute distress.  HEENT: Conjunctivae normal.  Neck: Trachea midline.  Cardiac: Heart regular rate, rhythm, no murmurs, rubs, or gallops  Lungs: Lungs are clear to auscultation, there is no wheezing, rhonchi, or rales. There is no use of accessory muscles.  Chest wall: There is no tenderness to palpation over the chest wall or over ribs  Abdomen: Moderate epigastric and right upper quadrant tenderness.  There is no distention, rebound, or guarding.  Musculoskeletal: No deformity.  Neuro: Alert and oriented x 4.  Dermatology: Skin is warm and dry  Psych: Mentation is grossly normal, cognition is " grossly normal. Affect is appropriate.        DIAGNOSTIC RESULTS     EKG: All EKGs are interpreted by the Emergency Department Physician who either signs or Co-signs this chart in the absence of a cardiologist.    No orders to display         RADIOLOGY:   [x] Radiologist's Report Reviewed:  No orders to display       I ordered and independently reviewed the above noted radiographic studies.        LABS:    I have reviewed and interpreted all of the currently available lab results from this visit (if applicable):  Results for orders placed or performed in visit on 10/04/21   Tissue Pathology Exam    Specimen: Colon; Tissue   Result Value Ref Range    Case Report       Surgical Pathology Report                         Case: [id number]                                  Authorizing Provider:  Grzegorz Hairston MD        Collected:           10/04/2021 01:15 PM          Ordering Location:     [facility]   Received:            10/05/2021 09:14 AM                                 LABORATORY                                                                   Pathologist:           Neal Mejia MD                                                           Specimen:    Colon                                                                                      Clinical Information       Unspecified diarrhea, change in bowel habit, screening, rule out microscopic colitis.       Final Diagnosis       RANDOM COLON BIOPSIES:                 No significant histopathologic abnormality                Negative for active colitis, microscopic colitis, and neoplasia      Gross Description       Specimen 1 is received in formalin labeled random colon biopsy and consists of a 1.1 x 0.5 x 0.2 cm aggregate of multiple fragments of tan soft tissue submitted entirely in 1A.  CLB      Microscopic Description       The slides are reviewed and demonstrate histopathologic features supporting the above rendered diagnosis.            If  labs were ordered, I independently reviewed the results and considered them in treating the patient.      EMERGENCY DEPARTMENT COURSE and DIFFERENTIAL DIAGNOSIS/MDM:   Vitals:  AS OF 16:15 EDT    BP - 134/78  HR - 78  TEMP - 98.3 °F (36.8 °C) (Oral)  O2 SATS - 97%        Discussion below represents my analysis of pertinent findings related to patient's condition, differential diagnosis, treatment plan and final disposition.      Differential diagnosis:  The differential diagnosis associated with the patient's presentation includes: Gastritis, cholecystitis, biliary colic, choledocholithiasis, cholangitis, acute hepatitis, obstruction, perforation, diverticulitis, appendicitis      Independent interpretations (ECG/rhythm strip/X-ray/US/CT scan): ***      Additional sources:  Discussed/obtained information from independent historians:   [] Spouse:   [] Parent:   [] Friend:   [] EMS:   [x] Other: I spoke with patient's son at bedside.  He tells me that patient has been eating very little today as a result of her pain.    External (non-ED) record review:   [] Inpatient record:   [] Office record:   [] Outpatient record:   [] Prior Outpatient labs:   [] Prior Outpatient radiology:   [] Primary Care record:   [] Outside ED record:   [] Other:       Patient's care impacted by:   [x] Diabetes   [] Hypertension   [] Coronary Artery Disease   [] Cancer   [x] Other: History of esophageal dysfunction with previous dilation by EGD    Care significantly affected by Social Determinants of Health (housing and economic circumstances, unemployment)    [] Yes     [x] No   If yes, Patient's care significantly limited by  Social Determinants of Health including:    [] Inadequate housing    [] Low income    [] Alcoholism and drug addiction in family    [] Problems related to primary support group    [] Unemployment    [] Problems related to employment    [] Other Social Determinants of Health:       Consideration of admission/observation  vs discharge: ***      I considered prescription management with:    [x] Pain medication: Patient given IV morphine for pain control   [] Antiviral:   [] Antibiotic:   [] Other:    Additional orders considered but not ordered:  The following testing was considered but ultimately not selected after discussion with patient/family: I considered atypical cardiac presentation, however pain is precisely reproducible on palpation and seems to be more gastrointestinal in nature and feel that this is unlikely and so troponin testing was not ordered.    ED Course:           ***    I had a discussion with the patient/family regarding diagnosis, diagnostic results, treatment plan, and medications.  The patient/family indicated understanding of these instructions.  I spent adequate time at the bedside preceding discharge necessary to personally discuss the aftercare instructions, giving patient education, providing explanations of the results of our evaluations/findings, and my decision making to assure that the patient/family understand the plan of care.  Time was allotted to answer questions at that time and throughout the ED course.  Emphasis was placed on timely follow-up after discharge.  I also discussed the potential for the development of an acute emergent condition requiring further evaluation, admission, or even surgical intervention. I discussed that we found nothing during the visit today indicating the need for further workup, admission, or the presence of an unstable medical condition.  I encouraged the patient to return to the emergency department immediately for ANY concerns, worsening, new complaints, or if symptoms persist and unable to seek follow-up in a timely fashion.  The patient/family expressed understanding and agreement with this plan.  The patient will follow-up with their PCP in 1-2 days for reevaluation.           PROCEDURES:  Procedures    CRITICAL CARE TIME        FINAL IMPRESSION    No diagnosis  found.      DISPOSITION/PLAN     ED Disposition     None            Comment: Please note this report has been produced using speech recognition software.      Berhane Hernandez MD  Attending Emergency Physician         Range    WBC 7.46 3.40 - 10.80 10*3/mm3    RBC 3.11 (L) 3.77 - 5.28 10*6/mm3    Hemoglobin 9.1 (L) 12.0 - 15.9 g/dL    Hematocrit 27.9 (L) 34.0 - 46.6 %    MCV 89.7 79.0 - 97.0 fL    MCH 29.3 26.6 - 33.0 pg    MCHC 32.6 31.5 - 35.7 g/dL    RDW 13.5 12.3 - 15.4 %    RDW-SD 44.6 37.0 - 54.0 fl    MPV 10.3 6.0 - 12.0 fL    Platelets 227 140 - 450 10*3/mm3    Neutrophil % 75.6 42.7 - 76.0 %    Lymphocyte % 13.8 (L) 19.6 - 45.3 %    Monocyte % 9.9 5.0 - 12.0 %    Eosinophil % 0.0 (L) 0.3 - 6.2 %    Basophil % 0.4 0.0 - 1.5 %    Immature Grans % 0.3 0.0 - 0.5 %    Neutrophils, Absolute 5.64 1.70 - 7.00 10*3/mm3    Lymphocytes, Absolute 1.03 0.70 - 3.10 10*3/mm3    Monocytes, Absolute 0.74 0.10 - 0.90 10*3/mm3    Eosinophils, Absolute 0.00 0.00 - 0.40 10*3/mm3    Basophils, Absolute 0.03 0.00 - 0.20 10*3/mm3    Immature Grans, Absolute 0.02 0.00 - 0.05 10*3/mm3    nRBC 0.0 0.0 - 0.2 /100 WBC   POC Glucose Once    Specimen: Blood   Result Value Ref Range    Glucose 60 (L) 70 - 130 mg/dL   POC Glucose Once    Specimen: Blood   Result Value Ref Range    Glucose 125 70 - 130 mg/dL   POC Glucose Once    Specimen: Blood   Result Value Ref Range    Glucose 186 (H) 70 - 130 mg/dL   POC Glucose Once    Specimen: Blood   Result Value Ref Range    Glucose 121 70 - 130 mg/dL   POC Glucose Once    Specimen: Blood   Result Value Ref Range    Glucose 143 (H) 70 - 130 mg/dL   ECG 12 Lead Chest Pain   Result Value Ref Range    QT Interval 358 ms    QTC Interval 442 ms   Green Top (Gel)   Result Value Ref Range    Extra Tube Hold for add-ons.    Lavender Top   Result Value Ref Range    Extra Tube hold for add-on    Gold Top - SST   Result Value Ref Range    Extra Tube Hold for add-ons.    Gray Top   Result Value Ref Range    Extra Tube Hold for add-ons.    Light Blue Top   Result Value Ref Range    Extra Tube Hold for add-ons.         If labs were ordered, I independently reviewed the results and considered them in treating the  patient.      EMERGENCY DEPARTMENT COURSE and DIFFERENTIAL DIAGNOSIS/MDM:   Vitals:  AS OF 14:35 EDT    BP - 147/68  HR - 79  TEMP - 98.2 °F (36.8 °C) (Oral)  O2 SATS - 98%        Discussion below represents my analysis of pertinent findings related to patient's condition, differential diagnosis, treatment plan and final disposition.      Differential diagnosis:  The differential diagnosis associated with the patient's presentation includes: Gastritis, cholecystitis, biliary colic, choledocholithiasis, cholangitis, acute hepatitis, obstruction, perforation, diverticulitis, appendicitis      Independent interpretations (ECG/rhythm strip/X-ray/US/CT scan): I independently interpreted the patient's abdominal CT which shows no evidence of obstruction.  I independently interpreted the patient's cardiac monitoring which demonstrates sinus rhythm without dysrhythmia.      Additional sources:  Discussed/obtained information from independent historians:   [] Spouse:   [] Parent:   [] Friend:   [] EMS:   [x] Other: I spoke with patient's son at bedside.  He tells me that patient has been eating very little today as a result of her pain.    External (non-ED) record review:   [] Inpatient record:   [] Office record:   [] Outpatient record:   [] Prior Outpatient labs:   [] Prior Outpatient radiology:   [] Primary Care record:   [] Outside ED record:   [] Other:       Patient's care impacted by:   [x] Diabetes   [] Hypertension   [] Coronary Artery Disease   [] Cancer   [x] Other: History of esophageal dysfunction with previous dilation by EGD    Care significantly affected by Social Determinants of Health (housing and economic circumstances, unemployment)    [] Yes     [x] No   If yes, Patient's care significantly limited by  Social Determinants of Health including:    [] Inadequate housing    [] Low income    [] Alcoholism and drug addiction in family    [] Problems related to primary support group    [] Unemployment    []  Problems related to employment    [] Other Social Determinants of Health:       Consideration of admission/observation vs discharge: Patient presents with initial episode of acute pancreatitis without known cause and requires admission for further evaluation.      I considered prescription management with:    [x] Pain medication: Patient given IV morphine for pain control   [] Antiviral:   [] Antibiotic:   [] Other:    Additional orders considered but not ordered:  The following testing was considered but ultimately not selected after discussion with patient/family: I considered atypical cardiac presentation, however pain is precisely reproducible on palpation and seems to be more gastrointestinal in nature and feel that this is unlikely and so troponin testing was not ordered.    ED Course:    ED Course as of 04/22/23 1435   Fri Mar 24, 2023   1851 I spoke with the hospitalist Dr. Alvares.  We discussed patient presentation, work-up, intervention.  She accepts patient for admission. [NS]      ED Course User Index  [NS] Berhane Hernandez MD           PROCEDURES:  Procedures    CRITICAL CARE TIME        FINAL IMPRESSION      1. Acute pancreatitis, unspecified complication status, unspecified pancreatitis type    2. Acute upper abdominal pain    3. Nausea and vomiting, unspecified vomiting type          DISPOSITION/PLAN     ED Disposition     ED Disposition   Decision to Admit    Condition   --    Comment   Level of Care: Telemetry [5]   Diagnosis: Pancreatitis [202663]   Admitting Physician: ELLIOTT ALVARES [[id number]]   Certification: I Certify That Inpatient Hospital Services Are Medically Necessary For Greater Than 2 Midnights                 Comment: Please note this report has been produced using speech recognition software.      Berhane Hernandez MD  Attending Emergency Physician           Berhane Hernandez MD  04/22/23 [id number]

## 2023-03-24 NOTE — Clinical Note
Level of Care: Telemetry [5]   Diagnosis: Pancreatitis [202663]   Admitting Physician: ELLIOTT ALVARES [[id number]]

## 2023-03-25 LAB
ANION GAP SERPL CALCULATED.3IONS-SCNC: 9 MMOL/L (ref 5–15)
BASOPHILS # BLD AUTO: 0.03 10*3/MM3 (ref 0–0.2)
BASOPHILS NFR BLD AUTO: 0.4 % (ref 0–1.5)
BUN SERPL-MCNC: 27 MG/DL (ref 8–23)
BUN/CREAT SERPL: 19 (ref 7–25)
CALCIUM SPEC-SCNC: 8.1 MG/DL (ref 8.6–10.5)
CHLORIDE SERPL-SCNC: 111 MMOL/L (ref 98–107)
CHOLEST SERPL-MCNC: 103 MG/DL (ref 0–200)
CO2 SERPL-SCNC: 23 MMOL/L (ref 22–29)
CREAT SERPL-MCNC: 1.42 MG/DL (ref 0.57–1)
DEPRECATED RDW RBC AUTO: 46.5 FL (ref 37–54)
EGFRCR SERPLBLD CKD-EPI 2021: 36.8 ML/MIN/1.73
EOSINOPHIL # BLD AUTO: 0 10*3/MM3 (ref 0–0.4)
EOSINOPHIL NFR BLD AUTO: 0 % (ref 0.3–6.2)
ERYTHROCYTE [DISTWIDTH] IN BLOOD BY AUTOMATED COUNT: 13.7 % (ref 12.3–15.4)
GEN 5 2HR TROPONIN T REFLEX: 20 NG/L
GLUCOSE BLDC GLUCOMTR-MCNC: 125 MG/DL (ref 70–130)
GLUCOSE BLDC GLUCOMTR-MCNC: 186 MG/DL (ref 70–130)
GLUCOSE BLDC GLUCOMTR-MCNC: 60 MG/DL (ref 70–130)
GLUCOSE SERPL-MCNC: 77 MG/DL (ref 65–99)
HCT VFR BLD AUTO: 29.6 % (ref 34–46.6)
HDLC SERPL-MCNC: 28 MG/DL (ref 40–60)
HGB BLD-MCNC: 9.1 G/DL (ref 12–15.9)
IMM GRANULOCYTES # BLD AUTO: 0.02 10*3/MM3 (ref 0–0.05)
IMM GRANULOCYTES NFR BLD AUTO: 0.3 % (ref 0–0.5)
LDLC SERPL CALC-MCNC: 53 MG/DL (ref 0–100)
LDLC/HDLC SERPL: 1.82 {RATIO}
LIPASE SERPL-CCNC: 2522 U/L (ref 13–60)
LYMPHOCYTES # BLD AUTO: 1.17 10*3/MM3 (ref 0.7–3.1)
LYMPHOCYTES NFR BLD AUTO: 15.5 % (ref 19.6–45.3)
MCH RBC QN AUTO: 28.3 PG (ref 26.6–33)
MCHC RBC AUTO-ENTMCNC: 30.7 G/DL (ref 31.5–35.7)
MCV RBC AUTO: 92.2 FL (ref 79–97)
MONOCYTES # BLD AUTO: 0.76 10*3/MM3 (ref 0.1–0.9)
MONOCYTES NFR BLD AUTO: 10.1 % (ref 5–12)
NEUTROPHILS NFR BLD AUTO: 5.56 10*3/MM3 (ref 1.7–7)
NEUTROPHILS NFR BLD AUTO: 73.7 % (ref 42.7–76)
NRBC BLD AUTO-RTO: 0 /100 WBC (ref 0–0.2)
PLATELET # BLD AUTO: 211 10*3/MM3 (ref 140–450)
PMV BLD AUTO: 11 FL (ref 6–12)
POTASSIUM SERPL-SCNC: 4.3 MMOL/L (ref 3.5–5.2)
QT INTERVAL: 358 MS
QTC INTERVAL: 442 MS
RBC # BLD AUTO: 3.21 10*6/MM3 (ref 3.77–5.28)
SODIUM SERPL-SCNC: 143 MMOL/L (ref 136–145)
T4 FREE SERPL-MCNC: 1.24 NG/DL (ref 0.93–1.7)
TRIGL SERPL-MCNC: 120 MG/DL (ref 0–150)
TROPONIN T DELTA: 1 NG/L
TROPONIN T SERPL HS-MCNC: 19 NG/L
TSH SERPL DL<=0.05 MIU/L-ACNC: 1.6 UIU/ML (ref 0.27–4.2)
VLDLC SERPL-MCNC: 22 MG/DL (ref 5–40)
WBC NRBC COR # BLD: 7.54 10*3/MM3 (ref 3.4–10.8)

## 2023-03-25 PROCEDURE — 80061 LIPID PANEL: CPT | Performed by: PHYSICIAN ASSISTANT

## 2023-03-25 PROCEDURE — 84443 ASSAY THYROID STIM HORMONE: CPT | Performed by: PHYSICIAN ASSISTANT

## 2023-03-25 PROCEDURE — 83690 ASSAY OF LIPASE: CPT | Performed by: INTERNAL MEDICINE

## 2023-03-25 PROCEDURE — 97161 PT EVAL LOW COMPLEX 20 MIN: CPT

## 2023-03-25 PROCEDURE — 99232 SBSQ HOSP IP/OBS MODERATE 35: CPT | Performed by: INTERNAL MEDICINE

## 2023-03-25 PROCEDURE — 25010000002 HEPARIN (PORCINE) PER 1000 UNITS: Performed by: PHYSICIAN ASSISTANT

## 2023-03-25 PROCEDURE — 85025 COMPLETE CBC W/AUTO DIFF WBC: CPT | Performed by: PHYSICIAN ASSISTANT

## 2023-03-25 PROCEDURE — 82962 GLUCOSE BLOOD TEST: CPT

## 2023-03-25 PROCEDURE — 63710000001 INSULIN LISPRO (HUMAN) PER 5 UNITS: Performed by: PHYSICIAN ASSISTANT

## 2023-03-25 PROCEDURE — 84439 ASSAY OF FREE THYROXINE: CPT | Performed by: PHYSICIAN ASSISTANT

## 2023-03-25 PROCEDURE — 93005 ELECTROCARDIOGRAM TRACING: CPT | Performed by: INTERNAL MEDICINE

## 2023-03-25 PROCEDURE — 84484 ASSAY OF TROPONIN QUANT: CPT | Performed by: INTERNAL MEDICINE

## 2023-03-25 PROCEDURE — 80048 BASIC METABOLIC PNL TOTAL CA: CPT | Performed by: PHYSICIAN ASSISTANT

## 2023-03-25 RX ORDER — TRIAMTERENE AND HYDROCHLOROTHIAZIDE 37.5; 25 MG/1; MG/1
1 TABLET ORAL DAILY
Status: DISCONTINUED | OUTPATIENT
Start: 2023-03-25 | End: 2023-03-26 | Stop reason: HOSPADM

## 2023-03-25 RX ORDER — HYOSCYAMINE SULFATE 0.125 MG
0.12 TABLET,DISINTEGRATING ORAL 3 TIMES DAILY PRN
Status: DISCONTINUED | OUTPATIENT
Start: 2023-03-25 | End: 2023-03-26 | Stop reason: HOSPADM

## 2023-03-25 RX ADMIN — Medication 10 ML: at 21:06

## 2023-03-25 RX ADMIN — SODIUM CHLORIDE 100 ML/HR: 9 INJECTION, SOLUTION INTRAVENOUS at 16:46

## 2023-03-25 RX ADMIN — ASPIRIN 325 MG: 325 TABLET, COATED ORAL at 08:14

## 2023-03-25 RX ADMIN — TRIAMTERENE AND HYDROCHLOROTHIAZIDE 1 TABLET: 37.5; 25 TABLET ORAL at 09:10

## 2023-03-25 RX ADMIN — LEVOTHYROXINE SODIUM 75 MCG: 0.07 TABLET ORAL at 08:14

## 2023-03-25 RX ADMIN — HEPARIN SODIUM 5000 UNITS: 5000 INJECTION INTRAVENOUS; SUBCUTANEOUS at 21:06

## 2023-03-25 RX ADMIN — DEXTROSE 15 G: 15 GEL ORAL at 08:14

## 2023-03-25 RX ADMIN — INSULIN LISPRO 2 UNITS: 100 INJECTION, SOLUTION INTRAVENOUS; SUBCUTANEOUS at 16:46

## 2023-03-25 RX ADMIN — ROSUVASTATIN 40 MG: 20 TABLET, FILM COATED ORAL at 08:13

## 2023-03-25 RX ADMIN — HEPARIN SODIUM 5000 UNITS: 5000 INJECTION INTRAVENOUS; SUBCUTANEOUS at 15:31

## 2023-03-25 RX ADMIN — PANTOPRAZOLE SODIUM 40 MG: 40 TABLET, DELAYED RELEASE ORAL at 05:10

## 2023-03-25 RX ADMIN — HEPARIN SODIUM 5000 UNITS: 5000 INJECTION INTRAVENOUS; SUBCUTANEOUS at 05:10

## 2023-03-25 NOTE — PLAN OF CARE
Goal Outcome Evaluation:  Plan of Care Reviewed With: patient        Progress: no change  Outcome Evaluation: Patient presents w/ generalized weakness, very mildly unsteady gait, decreased activity tolerance, and is below her functional baseline. She ambulated in hallway w/ SBA and no LOB, but fatigued w/ min exertion. Skilled IPPT warranted to promote return to PLOF. Recommend home w/ assist at D/C.

## 2023-03-25 NOTE — PLAN OF CARE
Goal Outcome Evaluation:              Outcome Evaluation: VSS, up to floor from ED, no acute distress noted, NPO, up in room ambulating to bathroom.  Will continue with plan of care.   shortness of breath

## 2023-03-25 NOTE — PROGRESS NOTES
[facility] Medicine Services  PROGRESS NOTE    Patient Name: Danay Gonzales  : 1940  MRN: [id number]    Date of Admission: 3/24/2023  Primary Care Physician: Mina Barnett MD    Subjective   Subjective     CC:  Abdominal pain    HPI:  No current abdominal pain or nausea.  Denies fever or chills.  Thinks she can eat something this morning    Chest pressure this morning.  Patient says she gets this way every time she misses a dose of her HCTZ/triamterene.  States that she feels like her fingers and hands are swollen.  Her rings usually flop around on her fingers and are not moving as much as usual this morning    ROS:  Gen: no fever or chills  GI: no nausea  CV: chest pressure    Objective   Objective     Vital Signs:   Temp:  [97.7 °F (36.5 °C)-98.5 °F (36.9 °C)] 98.5 °F (36.9 °C)  Heart Rate:  [73-90] 86  Resp:  [16-18] 18  BP: (117-175)/(44-95) 122/59     Physical Exam:  Constitutional - no acute distress, nontoxic, in bed  HEENT-NCAT, mucous membranes moist  CV-RRR  Resp-CTAB  Abd-soft, nontender, nondistended, normoactive bowel sounds  Ext-No lower extremity cyanosis, clubbing or edema bilaterally  Neuro-alert and oriented, speech clear, moves all extremities   Psych-normal affect   Skin- No rash on exposed UE or LE bilaterally      Results Reviewed:  LAB RESULTS:      Lab 23  0321 23  1619   WBC 7.54 11.90*   HEMOGLOBIN 9.1* 10.9*   HEMATOCRIT 29.6* 34.8   PLATELETS 211 278   NEUTROS ABS 5.56 9.23*   IMMATURE GRANS (ABS) 0.02 0.06*   LYMPHS ABS 1.17 1.47   MONOS ABS 0.76 1.11*   EOS ABS 0.00 0.00   MCV 92.2 92.8   PROCALCITONIN  --  0.18   LACTATE  --  0.7         Lab 23  0321 23  1619   SODIUM 143 141   POTASSIUM 4.3 4.2   CHLORIDE 111* 106   CO2 23.0 25.0   ANION GAP 9.0 10.0   BUN 27* 30*   CREATININE 1.42* 1.72*   EGFR 36.8* 29.2*   GLUCOSE 77 107*   CALCIUM 8.1* 9.1   HEMOGLOBIN A1C  --  6.60*   TSH 1.600  --          Lab 23  0323  03/24/23  1619   TOTAL PROTEIN  --  7.3   ALBUMIN  --  4.1   GLOBULIN  --  3.2   ALT (SGPT)  --  12   AST (SGOT)  --  23   BILIRUBIN  --  0.5   ALK PHOS  --  97   LIPASE 2,522* >3,000*             Lab 03/25/23  0321 03/24/23  1619   CHOLESTEROL 103 124   LDL CHOL 53 59   HDL CHOL 28* 35*   TRIGLYCERIDES 120 176*             Brief Urine Lab Results  (Last result in the past 365 days)      Color   Clarity   Blood   Leuk Est   Nitrite   Protein   CREAT   Urine HCG        03/24/23 1711 Yellow   Clear   Negative   Moderate (2+)   Negative   Trace                 Microbiology Results Abnormal     None          CT Abdomen Pelvis Without Contrast    Result Date: 3/24/2023  CT ABDOMEN PELVIS WO CONTRAST Date of Exam: 3/24/2023 5:38 PM EDT Indication: upper abd/ruq pain, eval for biliary pathology. Comparison: Right upper quadrant abdominal ultrasound 10/1/2021., CT chest 3/22/2019 Technique: Axial CT images were obtained of the abdomen and pelvis without the administration of contrast. Reconstructed coronal and sagittal images were also obtained. Automated exposure control and iterative construction methods were used. Findings: The gallbladder has an unremarkable appearance. No definite gallstones or pericholecystic fluid is identified. No intrahepatic or extra hepatic biliary ductal dilation is seen. No choledocholithiasis is evident. The liver, spleen, pancreas, adrenals, and right kidney have a normal noncontrast appearance. A nonspecific 5 mm hyperdense left renal cortical lesion may represent a tiny hemorrhagic or proteinaceous cyst. The appendix is not visualized, but no pericecal inflammation is seen. A few sparse diverticular outpouchings are present in the colon, but there is no evidence of acute diverticulitis. The large and small bowel loops do not appear abnormally thickened, dilated, or inflamed. No free air, free fluid or pathologically enlarged nodes are identified. Hysterectomy changes are present. Rectocele  is seen. Urinary bladder is decompressed but appears unremarkable. Laminectomy changes are present at L3-4, L4-5 and L5-S1. Degenerative disc and endplate changes are most notable at L2-3 through L4-5. There is lumbar dextroscoliotic curvature. No acute or suspicious osseous abnormalities are identified. Faint reticular nodular densities are seen within the left lower lobe suggesting small airways infectious/inflammatory process. A noncalcified nodule is seen in the right lower lobe just above the diaphragm measuring 4 mm, unchanged from the CT chest from 2019, in keeping with a benign finding. Small esophageal hiatal hernia is noted. Heart size is normal.     Impression: Impression: 1. No gallbladder abnormalities identified. No abnormal biliary dilation. 2. Additional chronic findings include: Stable 4 Ramos right lower lobe lung nodule since 2019, lumbar dextro scoliosis, multilevel lumbar laminectomies, hysterectomy, rectocele, sparse uncomplicated colonic diverticulosis. Electronically Signed: Lisa Cali  3/24/2023 5:48 PM EDT  Workstation ID: [id number]      Results for orders placed during the hospital encounter of 11/25/19    Adult Transthoracic Echo Complete W/ Cont if Necessary Per Protocol    Interpretation Summary  · Left ventricular systolic function is normal.  · Estimated EF appears to be in the range of 66 - 70%.  · Left ventricular diastolic dysfunction (grade I a) consistent with impaired relaxation.  · Bicuspid aortic valve with mild aortic valve stenosis.  · Borderline dilation of the proximal ascending aorta measuring 3.5 cm, poorly visualized.      Current medications:  Scheduled Meds:aspirin EC, 325 mg, Oral, Daily  heparin (porcine), 5,000 Units, Subcutaneous, Q8H  insulin lispro, 0-9 Units, Subcutaneous, TID AC  levothyroxine, 75 mcg, Oral, Daily  pantoprazole, 40 mg, Oral, Q AM  rosuvastatin, 40 mg, Oral, Daily  sodium chloride, 10 mL, Intravenous, Q12H      Continuous Infusions:sodium  chloride, 100 mL/hr, Last Rate: 100 mL/hr (03/24/23 2125)      PRN Meds:.•  acetaminophen  •  dextrose  •  dextrose  •  glucagon (human recombinant)  •  melatonin  •  ondansetron **OR** ondansetron  •  Sodium Chloride (PF)  •  sodium chloride  •  sodium chloride    Assessment & Plan   Assessment & Plan     Active Hospital Problems    Diagnosis  POA   • **Pancreatitis [K85.90]  Yes   • CKD (chronic kidney disease) stage 3, GFR 30-59 ml/min ([facility]) [N18.30]  Yes   • Hypothyroidism [E03.9]  Yes   • Type 2 diabetes mellitus ([facility]) [E11.9]  Yes   • Essential hypertension [I10]  Yes   • Hyperlipidemia [E78.5]  Yes   • GERD without esophagitis [K21.9]  Yes   • PVD (peripheral vascular disease) ([facility]) [I73.9]  Yes      Resolved Hospital Problems   No resolved problems to display.        Brief Hospital Course to date:  Danay Gonzales is a 82 y.o. female with history of PVD, RA, Sjogren's, hypertension, hyperlipidemia, DM 2, hypothyroidism, HTN, carotid stenosis, esophageal dysmotility s/p esophageal dilation (Dr. Hairston) who presents with two days of upper abdominal pain.    Acute pancreatitis  - patient denies prior episodes  - lipase > 3000  - no pancreatic abnormalities noted on CT abdomen  - IV fluids  - trial diet this am    RA/Sjogren's?  - patient says she was started on Plaquenil 6 weeks ago by rheumatology Dr. Pinto, but is uncertain of the exact diagnosis  -She ran out of this medication 3 weeks ago and has not been able to get it refilled due to insurance issues    Esophageal dysmotility s/p esophageal dilations (Dr. Hairston)  - Ms Gonzales says she typically takes hyoscyamine before meals to relax her esophagus.  She states she has been treated this way for the past 2 years.  She also states Dr. Hairston does not plan to do any further dilations due to possible thinness of her esophagus  -Consider restarting hyoscyamine here as long as cardiac work-up remains negative    Chest pressure  -Ms. Gonzales is experiencing chest  pressure this morning.  She says this always occurs if she misses a dose of her home blood pressure medicine (triamterene HCTZ).  She has not taken this medication for the past 48 hours.  She has been on this medicine for at least the past 2 years per her report.  -EKG reassuring with no ST changes or T wave inversions.  -We will check troponin x2  -Ms. Gonzales appears to be having considerable anxiety over missing her home blood pressure medicine and we will resume this morning    Hypertension  -As above    Dyslipidemia    Carotid stenosis    Hypothyroid  - TSH 1.6    DMII  - a1c 6.6  - consistent carb diet    Renal insufficiency  - appears near baseline, creatinine 1.4  - consider stopping triamterene and placing patient on HCTZ monotherapy      Expected Discharge Location and Transportation: Home  Expected Discharge        DVT prophylaxis:  Medical DVT prophylaxis orders are present.     AM-PAC 6 Clicks Score (PT): 23 (03/24/23 2040)    CODE STATUS:   Code Status and Medical Interventions:   Ordered at: 03/24/23 1951     Code Status (Patient has no pulse and is not breathing):    CPR (Attempt to Resuscitate)     Medical Interventions (Patient has pulse or is breathing):    Full Support       Peng Goetz MD  03/25/23

## 2023-03-25 NOTE — THERAPY EVALUATION
Patient Name: Danay Gonzales  : 1940    MRN: [id number]                              Today's Date: 3/25/2023       Admit Date: 3/24/2023    Visit Dx:     ICD-10-CM ICD-9-CM   1. Acute pancreatitis, unspecified complication status, unspecified pancreatitis type  K85.90 577.0   2. Acute upper abdominal pain  R10.10 789.09   3. Nausea and vomiting, unspecified vomiting type  R11.2 787.01     Patient Active Problem List   Diagnosis   • PVD (peripheral vascular disease) ([facility])   • Lumbar stenosis with neurogenic claudication   • Spondylosis of lumbar region without myelopathy or radiculopathy   • Sacroiliac joint dysfunction of left side   • Gait disturbance   • At high risk for falls   • Degeneration of lumbar or lumbosacral intervertebral disc   • Anemia with chronic illness   • Pancreatitis   • CKD (chronic kidney disease) stage 3, GFR 30-59 ml/min ([facility])   • Hypothyroidism   • Type 2 diabetes mellitus ([facility])   • Essential hypertension   • Hyperlipidemia   • GERD without esophagitis     Past Medical History:   Diagnosis Date   • Anemia    • Arthritis    • Cancer ([facility])     skin   • Chronic constipation    • Diabetes mellitus (HCC)     borderline - no medications   • Disease of thyroid gland    • GERD (gastroesophageal reflux disease)    • Glaucoma    • Headache    • Hearing aid worn    • History of bronchitis    • History of migraine    • History of pneumonia    • Hypertension    • Kidney disease    • Low back pain    • Lumbar stenosis    • Menopause    • Mumps    • Osteoporosis    • PVD (peripheral vascular disease) ([facility])    • Stroke ([facility])     right hand weakness    • Vertigo    • Wears glasses      Past Surgical History:   Procedure Laterality Date   • BREAST BIOPSY Bilateral     BOTH BREASTS US BX, PT DOES NOT REMEMBER WHEN   • CATARACT EXTRACTION, BILATERAL     • COLONOSCOPY  10/2016   • HYSTERECTOMY  1981    Complete   • LUMBAR LAMINECTOMY DISCECTOMY DECOMPRESSION N/A 2020    Procedure: LUMBAR  LAMINECTOMY L3- S1;  Surgeon: Jae Graves MD;  Location: [facility];  Service: Neurosurgery;  Laterality: N/A;   • OOPHORECTOMY      Comnplete Hysterectomy   • OTHER SURGICAL HISTORY  1999    Scalp   • SKIN CANCER EXCISION Left 2005    Hand   • TONSILLECTOMY  1944   • UPPER GASTROINTESTINAL ENDOSCOPY  10/2016    Esophageal Stretching      General Information     Row Name 03/25/23 1339          Physical Therapy Time and Intention    Document Type evaluation  -MB     Mode of Treatment physical therapy  -MB     Row Name 03/25/23 1339          General Information    Patient Profile Reviewed yes  -MB     Prior Level of Function independent:;bed mobility;ADL's;transfer;gait;all household mobility;home management;driving;using stairs  -MB     Existing Precautions/Restrictions fall  -MB     Barriers to Rehab none identified  -MB     Row Name 03/25/23 1339          Living Environment    People in Home child(sydnie), adult  -MB     Row Name 03/25/23 1339          Home Main Entrance    Number of Stairs, Main Entrance two  -MB     Stair Railings, Main Entrance railing on left side (ascending)  -MB     Row Name 03/25/23 1339          Stairs Within Home, Primary    Stairs, Within Home, Primary All needs met on 1st floor. Pt. has an upstairs sewing room she enjoys.  -MB     Number of Stairs, Within Home, Primary --  13  -MB     Stair Railings, Within Home, Primary railings on both sides of stairs  -MB     Row Name 03/25/23 1331          Cognition    Orientation Status (Cognition) oriented x 4  -MB     Row Name 03/25/23 1335          Safety Issues, Functional Mobility    Impairments Affecting Function (Mobility) endurance/activity tolerance;strength  -MB           User Key  (r) = Recorded By, (t) = Taken By, (c) = Cosigned By    Initials Name Provider Type    Gloria Fairbanks, PT Physical Therapist               Mobility     Row Name 03/25/23 4417          Bed Mobility    Bed Mobility supine-sit  -MB     Supine-Sit  [place] (Bed Mobility) modified independence  -MB     Assistive Device (Bed Mobility) head of bed elevated  -MB     Comment, (Bed Mobility) Pt. completed bed mobility w/out difficulty.  -MB     Row Name 03/25/23 1558          Transfers    Comment, (Transfers) Pt. demo safe and appropriate transfer technique w/ no LOB or instability.  -MB     Row Name 03/25/23 1558          Sit-Stand Transfer    Sit-Stand [place] (Transfers) independent  -MB     Row Name 03/25/23 1558          Gait/Stairs (Locomotion)    [place] Level (Gait) standby assist;verbal cues  -MB     Distance in Feet (Gait) 150  -MB     Deviations/Abnormal Patterns (Gait) dennis decreased  -MB     Bilateral Gait Deviations forward flexed posture;heel strike decreased  -MB     [place] Level (Stairs) not tested  -MB     Comment, (Gait/Stairs) Pt. ambulated w/ step through gait pattern w/ slow pace, kyphotic posture. VCs for forward gaze and increased B heelstrike. No LOB or instability.  -MB           User Key  (r) = Recorded By, (t) = Taken By, (c) = Cosigned By    Initials Name Provider Type    MB Gloria Iniguez, PT Physical Therapist               Obj/Interventions     Row Name 03/25/23 1600          Range of Motion Comprehensive    General Range of Motion bilateral lower extremity ROM WFL;bilateral upper extremity ROM WFL  -MB     Comment, General Range of Motion Kyphotic posture  -MB     Row Name 03/25/23 1600          Strength Comprehensive (MMT)    General Manual Muscle Testing (MMT) Assessment upper extremity strength deficits identified;lower extremity strength deficits identified  -MB     Comment, General Manual Muscle Testing (MMT) Assessment BLEs and BUEs grossly 4/5  -MB     Row Name 03/25/23 1600          Balance    Balance Assessment sitting static balance;standing static balance;standing dynamic balance  -MB     Static Sitting Balance independent  -MB     Position, Sitting Balance unsupported;sitting edge of bed  -MB      Static Standing Balance independent  -MB     Dynamic Standing Balance supervision  -MB     Position/Device Used, Standing Balance unsupported  -MB     Balance Interventions standing;sit to stand;weight shifting activity  -MB     Row Name 03/25/23 1600          Sensory Assessment (Somatosensory)    Sensory Assessment (Somatosensory) bilateral LE  -MB     Bilateral LE Sensory Assessment light touch awareness;impaired  BLE neuropathy  -MB           User Key  (r) = Recorded By, (t) = Taken By, (c) = Cosigned By    Initials Name Provider Type    Gloria Fairbanks, PT Physical Therapist               Goals/Plan     Row Name 03/25/23 1605          Gait Training Goal 1 (PT)    Activity/Assistive Device (Gait Training Goal 1, PT) gait (walking locomotion);improve balance and speed;increase endurance/gait distance  -MB     [place] Level (Gait Training Goal 1, PT) independent  -MB     Distance (Gait Training Goal 1, PT) 150  -MB     Time Frame (Gait Training Goal 1, PT) 10 days  -MB     Row Name 03/25/23 1605          Stairs Goal 1 (PT)    Activity/Assistive Device (Stairs Goal 1, PT) ascending stairs;descending stairs;using handrail, left  -MB     [place] Level/Cues Needed (Stairs Goal 1, PT) modified independence  -MB     Number of Stairs (Stairs Goal 1, PT) 2  -MB     Time Frame (Stairs Goal 1, PT) by discharge  -MB     Row Name 03/25/23 1605          Patient Education Goal (PT)    Activity (Patient Education Goal, PT) HEP  -MB     [place]/Cues/Accuracy (Memory Goal 2, PT) demonstrates adequately  -MB     Time Frame (Patient Education Goal, PT) 1 week  -MB     Row Name 03/25/23 1605          Therapy Assessment/Plan (PT)    Planned Therapy Interventions (PT) balance training;gait training;home exercise program;patient/family education;stair training;strengthening;transfer training  -MB           User Key  (r) = Recorded By, (t) = Taken By, (c) = Cosigned By    Initials Name Provider Type    MB  Gloria Iniguez, PT Physical Therapist               Clinical Impression     Row Name 03/25/23 1601          Pain    Pretreatment Pain Rating 0/10 - no pain  -MB     Posttreatment Pain Rating 0/10 - no pain  -MB     Row Name 03/25/23 1601          Plan of Care Review    Plan of Care Reviewed With patient  -MB     Progress no change  -MB     Outcome Evaluation Patient presents w/ generalized weakness, very mildly unsteady gait, decreased activity tolerance, and is below her functional baseline. She ambulated in hallway w/ SBA and no LOB, but fatigued w/ min exertion. Skilled IPPT warranted to promote return to PLOF. Recommend home w/ assist at D/C.  -MB     Row Name 03/25/23 1601          Therapy Assessment/Plan (PT)    Rehab Potential (PT) good, to achieve stated therapy goals  -MB     Criteria for Skilled Interventions Met (PT) yes;meets criteria;skilled treatment is necessary  -MB     Therapy Frequency (PT) daily  -MB     Row Name 03/25/23 1601          Vital Signs    Pre Systolic BP Rehab 115  -MB     Pre Treatment Diastolic BP 80  -MB     Pretreatment Heart Rate (beats/min) 91  -MB     Pre SpO2 (%) 94  -MB     O2 Delivery Pre Treatment room air  -MB     O2 Delivery Intra Treatment room air  -MB     Post SpO2 (%) 95  -MB     O2 Delivery Post Treatment room air  -MB     Pre Patient Position Supine  -MB     Intra Patient Position Standing  -MB     Post Patient Position Sitting  -MB     Row Name 03/25/23 1601          Positioning and Restraints    Pre-Treatment Position in bed  -MB     Post Treatment Position chair  -MB     In Chair notified nsg;reclined;call light within reach;encouraged to call for assist;exit alarm on;waffle cushion;legs elevated;heels elevated  -MB           User Key  (r) = Recorded By, (t) = Taken By, (c) = Cosigned By    Initials Name Provider Type    Gloria Fairbanks, PT Physical Therapist               Outcome Measures     Row Name 03/25/23 1606 03/25/23 0813       How much help  from another person do you currently need...    Turning from your back to your side while in flat bed without using bedrails? 4  -MB 4  -KT    Moving from lying on back to sitting on the side of a flat bed without bedrails? 4  -MB 4  -KT    Moving to and from a bed to a chair (including a wheelchair)? 4  -MB 4  -KT    Standing up from a chair using your arms (e.g., wheelchair, bedside chair)? 4  -MB 4  -KT    Climbing 3-5 steps with a railing? 3  -MB 4  -KT    To walk in hospital room? 3  -MB 3  -KT    AM-PAC 6 Clicks Score (PT) 22  -MB 23  -KT    Highest level of mobility 7 --> Walked 25 feet or more  -MB 7 --> Walked 25 feet or more  -KT    Row Name 03/25/23 1606          Functional Assessment    Outcome Measure Options AM-PAC 6 Clicks Basic Mobility (PT)  -MB           User Key  (r) = Recorded By, (t) = Taken By, (c) = Cosigned By    Initials Name Provider Type    Gloria Fairbanks, PT Physical Therapist    Katina Delatorre, RN Registered Nurse                             Physical Therapy Education     Title: PT OT SLP Therapies (Done)     Topic: Physical Therapy (Done)     Point: Mobility training (Done)     Learning Progress Summary           Patient Acceptance, E,D, VU,DU by MB at 3/25/2023 1606                   Point: Home exercise program (Done)     Learning Progress Summary           Patient Acceptance, E,D, VU,DU by MB at 3/25/2023 1606                   Point: Body mechanics (Done)     Learning Progress Summary           Patient Acceptance, E,D, VU,DU by MB at 3/25/2023 1606                   Point: Precautions (Done)     Learning Progress Summary           Patient Acceptance, E,D, VU,DU by MB at 3/25/2023 1606                               User Key     Initials Effective Dates Name Provider Type Discipline    MB 06/16/21 -  Gloria Iniguez, PT Physical Therapist PT              PT Recommendation and Plan  Planned Therapy Interventions (PT): balance training, gait training, home exercise  program, patient/family education, stair training, strengthening, transfer training  Plan of Care Reviewed With: patient  Progress: no change  Outcome Evaluation: Patient presents w/ generalized weakness, very mildly unsteady gait, decreased activity tolerance, and is below her functional baseline. She ambulated in hallway w/ SBA and no LOB, but fatigued w/ min exertion. Skilled IPPT warranted to promote return to PLOF. Recommend home w/ assist at D/C.     Time Calculation:    PT Charges     Row Name 03/25/23 1607             Time Calculation    Start Time 1339  -MB      PT Received On 03/25/23  -MB      PT Goal Re-Cert Due Date 04/04/23  -MB         Untimed Charges    PT Eval/Re-eval Minutes 51  -MB         Total Minutes    Untimed Charges Total Minutes 51  -MB       Total Minutes 51  -MB            User Key  (r) = Recorded By, (t) = Taken By, (c) = Cosigned By    Initials Name Provider Type    Gloria Fairbanks, PT Physical Therapist              Therapy Charges for Today     Code Description Service Date Service Provider Modifiers Qty    [id number] HC PT EVAL LOW COMPLEXITY 4 3/25/2023 Gloria Iniguez, PT GP 1          PT G-Codes  Outcome Measure Options: AM-PAC 6 Clicks Basic Mobility (PT)  AM-PAC 6 Clicks Score (PT): 22  PT Discharge Summary  Anticipated Discharge Disposition (PT): home with assist    Gloria Iniguez, PT  3/25/2023

## 2023-03-26 ENCOUNTER — READMISSION MANAGEMENT (OUTPATIENT)
Dept: CALL CENTER | Facility: HOSPITAL | Age: 83
End: 2023-03-26
Payer: MEDICARE

## 2023-03-26 VITALS
TEMPERATURE: 98.2 F | OXYGEN SATURATION: 98 % | DIASTOLIC BLOOD PRESSURE: 68 MMHG | SYSTOLIC BLOOD PRESSURE: 147 MMHG | RESPIRATION RATE: 16 BRPM | HEART RATE: 79 BPM | BODY MASS INDEX: 23.91 KG/M2 | HEIGHT: 58 IN | WEIGHT: 113.9 LBS

## 2023-03-26 LAB
ALBUMIN SERPL-MCNC: 3.1 G/DL (ref 3.5–5.2)
ALBUMIN/GLOB SERPL: 1.6 G/DL
ALP SERPL-CCNC: 85 U/L (ref 39–117)
ALT SERPL W P-5'-P-CCNC: 9 U/L (ref 1–33)
ANION GAP SERPL CALCULATED.3IONS-SCNC: 11 MMOL/L (ref 5–15)
AST SERPL-CCNC: 14 U/L (ref 1–32)
BASOPHILS # BLD AUTO: 0.03 10*3/MM3 (ref 0–0.2)
BASOPHILS NFR BLD AUTO: 0.4 % (ref 0–1.5)
BILIRUB SERPL-MCNC: 0.6 MG/DL (ref 0–1.2)
BUN SERPL-MCNC: 19 MG/DL (ref 8–23)
BUN/CREAT SERPL: 14.4 (ref 7–25)
CALCIUM SPEC-SCNC: 8.1 MG/DL (ref 8.6–10.5)
CHLORIDE SERPL-SCNC: 111 MMOL/L (ref 98–107)
CO2 SERPL-SCNC: 20 MMOL/L (ref 22–29)
CREAT SERPL-MCNC: 1.32 MG/DL (ref 0.57–1)
DEPRECATED RDW RBC AUTO: 44.6 FL (ref 37–54)
EGFRCR SERPLBLD CKD-EPI 2021: 40.1 ML/MIN/1.73
EOSINOPHIL # BLD AUTO: 0 10*3/MM3 (ref 0–0.4)
EOSINOPHIL NFR BLD AUTO: 0 % (ref 0.3–6.2)
ERYTHROCYTE [DISTWIDTH] IN BLOOD BY AUTOMATED COUNT: 13.5 % (ref 12.3–15.4)
GLOBULIN UR ELPH-MCNC: 2 GM/DL
GLUCOSE BLDC GLUCOMTR-MCNC: 121 MG/DL (ref 70–130)
GLUCOSE BLDC GLUCOMTR-MCNC: 143 MG/DL (ref 70–130)
GLUCOSE SERPL-MCNC: 119 MG/DL (ref 65–99)
HCT VFR BLD AUTO: 27.9 % (ref 34–46.6)
HGB BLD-MCNC: 9.1 G/DL (ref 12–15.9)
IMM GRANULOCYTES # BLD AUTO: 0.02 10*3/MM3 (ref 0–0.05)
IMM GRANULOCYTES NFR BLD AUTO: 0.3 % (ref 0–0.5)
LIPASE SERPL-CCNC: 103 U/L (ref 13–60)
LYMPHOCYTES # BLD AUTO: 1.03 10*3/MM3 (ref 0.7–3.1)
LYMPHOCYTES NFR BLD AUTO: 13.8 % (ref 19.6–45.3)
MCH RBC QN AUTO: 29.3 PG (ref 26.6–33)
MCHC RBC AUTO-ENTMCNC: 32.6 G/DL (ref 31.5–35.7)
MCV RBC AUTO: 89.7 FL (ref 79–97)
MONOCYTES # BLD AUTO: 0.74 10*3/MM3 (ref 0.1–0.9)
MONOCYTES NFR BLD AUTO: 9.9 % (ref 5–12)
NEUTROPHILS NFR BLD AUTO: 5.64 10*3/MM3 (ref 1.7–7)
NEUTROPHILS NFR BLD AUTO: 75.6 % (ref 42.7–76)
NRBC BLD AUTO-RTO: 0 /100 WBC (ref 0–0.2)
PLATELET # BLD AUTO: 227 10*3/MM3 (ref 140–450)
PMV BLD AUTO: 10.3 FL (ref 6–12)
POTASSIUM SERPL-SCNC: 4.3 MMOL/L (ref 3.5–5.2)
PROT SERPL-MCNC: 5.1 G/DL (ref 6–8.5)
RBC # BLD AUTO: 3.11 10*6/MM3 (ref 3.77–5.28)
SODIUM SERPL-SCNC: 142 MMOL/L (ref 136–145)
WBC NRBC COR # BLD: 7.46 10*3/MM3 (ref 3.4–10.8)

## 2023-03-26 PROCEDURE — 82962 GLUCOSE BLOOD TEST: CPT

## 2023-03-26 PROCEDURE — 80053 COMPREHEN METABOLIC PANEL: CPT | Performed by: INTERNAL MEDICINE

## 2023-03-26 PROCEDURE — 25010000002 HEPARIN (PORCINE) PER 1000 UNITS: Performed by: PHYSICIAN ASSISTANT

## 2023-03-26 PROCEDURE — 85025 COMPLETE CBC W/AUTO DIFF WBC: CPT | Performed by: PHYSICIAN ASSISTANT

## 2023-03-26 PROCEDURE — 99239 HOSP IP/OBS DSCHRG MGMT >30: CPT | Performed by: INTERNAL MEDICINE

## 2023-03-26 PROCEDURE — 83690 ASSAY OF LIPASE: CPT | Performed by: INTERNAL MEDICINE

## 2023-03-26 RX ADMIN — PANTOPRAZOLE SODIUM 40 MG: 40 TABLET, DELAYED RELEASE ORAL at 05:35

## 2023-03-26 RX ADMIN — Medication 10 ML: at 08:03

## 2023-03-26 RX ADMIN — ASPIRIN 325 MG: 325 TABLET, COATED ORAL at 08:02

## 2023-03-26 RX ADMIN — LEVOTHYROXINE SODIUM 75 MCG: 0.07 TABLET ORAL at 08:03

## 2023-03-26 RX ADMIN — HYOSCYAMINE SULFATE 0.12 MG: 0.12 TABLET, ORALLY DISINTEGRATING ORAL; SUBLINGUAL at 08:02

## 2023-03-26 RX ADMIN — HEPARIN SODIUM 5000 UNITS: 5000 INJECTION INTRAVENOUS; SUBCUTANEOUS at 05:35

## 2023-03-26 RX ADMIN — TRIAMTERENE AND HYDROCHLOROTHIAZIDE 1 TABLET: 37.5; 25 TABLET ORAL at 08:03

## 2023-03-26 RX ADMIN — ROSUVASTATIN 40 MG: 20 TABLET, FILM COATED ORAL at 08:03

## 2023-03-26 NOTE — DISCHARGE SUMMARY
[facility] Medicine Services  DISCHARGE SUMMARY    Patient Name: Danay Gonzales  : 1940  MRN: [id number]    Date of Admission: 3/24/2023  6:23 PM  Date of Discharge:  3/26/23  Primary Care Physician: Mina Barnett MD    Consults     No orders found from 2023 to 3/25/2023.          Hospital Course     Presenting Problem:   Pancreatitis [K85.90]    Active Hospital Problems    Diagnosis  POA   • **Pancreatitis [K85.90]  Yes   • CKD (chronic kidney disease) stage 3, GFR 30-59 ml/min ([facility]) [N18.30]  Yes   • Hypothyroidism [E03.9]  Yes   • Type 2 diabetes mellitus (HCC) [E11.9]  Yes   • Essential hypertension [I10]  Yes   • Hyperlipidemia [E78.5]  Yes   • GERD without esophagitis [K21.9]  Yes   • PVD (peripheral vascular disease) ([facility]) [I73.9]  Yes      Resolved Hospital Problems   No resolved problems to display.          Hospital Course:  Danay Gonzales is a 82 y.o. female with history of PVD, RA, Sjogren's, hypertension, hyperlipidemia, DM 2, hypothyroidism, HTN, carotid stenosis, esophageal dysmotility s/p esophageal dilation (Dr. Hairston) who presents with two days of upper abdominal pain.     Acute pancreatitis  - patient denies prior episodes  - lipase > 3000 at admission  - no pancreatic abnormalities noted on CT abdomen pelvis  - clinically improved with IV fluid hydration  - no history of alcohol use, no biliary dilation/gallstones on CT, LFTs normal  - recently started on plaquenil, however patient has been out of this medication for 3 weeks and it is not an obvious medication offender.  She does take HCTZ chronically, and appears reluctant to stop this medication  - tolerating diet with no further abdominal pain > 24 hrs at the time of discharge  - followup outpatient with Gastroenterology (previously established with Dr Hairston)     RA/Sjogren's?  - patient says she was started on Plaquenil 6 weeks ago by rheumatology Dr. Pinto, but is uncertain of the exact  diagnosis  -She ran out of this medication 3 weeks ago and has not been able to get it refilled due to insurance issues     Esophageal dysmotility s/p esophageal dilations (Dr. Hairston)  - Ms Gonzales says she typically takes hyoscyamine before meals to relax her esophagus.  She states she has been treated this way for the past 2 years.  She also states Dr. Hairston does not plan to do any further dilations due to possible thinness of her esophagus        Hypertension  -Resumed home medication     Dyslipidemia     Carotid stenosis     Hypothyroid  - TSH 1.6     DMII  - a1c 6.6  - consistent carb diet     Renal insufficiency  - appears near baseline, creatinine 1.4  - consider stopping triamterene and placing patient on HCTZ monotherapy  - follow up with PCP in one week      Discharge Follow Up Recommendations for outpatient labs/diagnostics:      Day of Discharge     HPI:   No abdominal pain -- patient says she has been pain free since shortly after admission.  No nausea, tolerating diet. Denies fever.  Would like to go home today.    Review of Systems  Gen: no fever  GI: no abd pain or nausea  CV: no chest pain or pressure.    Vital Signs:   Temp:  [98.2 °F (36.8 °C)-99.8 °F (37.7 °C)] 98.2 °F (36.8 °C)  Heart Rate:  [75-92] 87  Resp:  [16-18] 16  BP: (122-161)/(56-91) 147/68      Physical Exam:  Constitutional - no acute distress, nontoxic, in bed  HEENT-NCAT, mucous membranes moist  CV-RRR  Resp-CTAB  Abd-soft, nontender, nondistended, normoactive bowel sounds  Ext-No lower extremity cyanosis, clubbing or edema bilaterally  Neuro-alert and oriented, speech clear, moves all extremities   Psych-normal affect   Skin- No rash on exposed UE or LE bilaterally        Pertinent  and/or Most Recent Results     LAB RESULTS:      Lab 03/26/23  0705 03/25/23  0321 03/24/23  1619   WBC 7.46 7.54 11.90*   HEMOGLOBIN 9.1* 9.1* 10.9*   HEMATOCRIT 27.9* 29.6* 34.8   PLATELETS 227 211 278   NEUTROS ABS 5.64 5.56 9.23*   IMMATURE GRANS  (ABS) 0.02 0.02 0.06*   LYMPHS ABS 1.03 1.17 1.47   MONOS ABS 0.74 0.76 1.11*   EOS ABS 0.00 0.00 0.00   MCV 89.7 92.2 92.8   PROCALCITONIN  --   --  0.18   LACTATE  --   --  0.7         Lab 03/26/23  0705 03/25/23  0321 03/24/23  1619   SODIUM 142 143 141   POTASSIUM 4.3 4.3 4.2   CHLORIDE 111* 111* 106   CO2 20.0* 23.0 25.0   ANION GAP 11.0 9.0 10.0   BUN 19 27* 30*   CREATININE 1.32* 1.42* 1.72*   EGFR 40.1* 36.8* 29.2*   GLUCOSE 119* 77 107*   CALCIUM 8.1* 8.1* 9.1   HEMOGLOBIN A1C  --   --  6.60*   TSH  --  1.600  --          Lab 03/26/23  0705 03/25/23  0321 03/24/23  1619   TOTAL PROTEIN 5.1*  --  7.3   ALBUMIN 3.1*  --  4.1   GLOBULIN 2.0  --  3.2   ALT (SGPT) 9  --  12   AST (SGOT) 14  --  23   BILIRUBIN 0.6  --  0.5   ALK PHOS 85  --  97   LIPASE 103* 2,522* >3,000*         Lab 03/25/23  1341 03/25/23  0948   HSTROP T 20* 19*         Lab 03/25/23  0321 03/24/23  1619   CHOLESTEROL 103 124   LDL CHOL 53 59   HDL CHOL 28* 35*   TRIGLYCERIDES 120 176*             Brief Urine Lab Results  (Last result in the past 365 days)      Color   Clarity   Blood   Leuk Est   Nitrite   Protein   CREAT   Urine HCG        03/24/23 1711 Yellow   Clear   Negative   Moderate (2+)   Negative   Trace               Microbiology Results (last 10 days)     ** No results found for the last 240 hours. **          CT Abdomen Pelvis Without Contrast    Result Date: 3/24/2023  CT ABDOMEN PELVIS WO CONTRAST Date of Exam: 3/24/2023 5:38 PM EDT Indication: upper abd/ruq pain, eval for biliary pathology. Comparison: Right upper quadrant abdominal ultrasound 10/1/2021., CT chest 3/22/2019 Technique: Axial CT images were obtained of the abdomen and pelvis without the administration of contrast. Reconstructed coronal and sagittal images were also obtained. Automated exposure control and iterative construction methods were used. Findings: The gallbladder has an unremarkable appearance. No definite gallstones or pericholecystic fluid is  identified. No intrahepatic or extra hepatic biliary ductal dilation is seen. No choledocholithiasis is evident. The liver, spleen, pancreas, adrenals, and right kidney have a normal noncontrast appearance. A nonspecific 5 mm hyperdense left renal cortical lesion may represent a tiny hemorrhagic or proteinaceous cyst. The appendix is not visualized, but no pericecal inflammation is seen. A few sparse diverticular outpouchings are present in the colon, but there is no evidence of acute diverticulitis. The large and small bowel loops do not appear abnormally thickened, dilated, or inflamed. No free air, free fluid or pathologically enlarged nodes are identified. Hysterectomy changes are present. Rectocele is seen. Urinary bladder is decompressed but appears unremarkable. Laminectomy changes are present at L3-4, L4-5 and L5-S1. Degenerative disc and endplate changes are most notable at L2-3 through L4-5. There is lumbar dextroscoliotic curvature. No acute or suspicious osseous abnormalities are identified. Faint reticular nodular densities are seen within the left lower lobe suggesting small airways infectious/inflammatory process. A noncalcified nodule is seen in the right lower lobe just above the diaphragm measuring 4 mm, unchanged from the CT chest from 2019, in keeping with a benign finding. Small esophageal hiatal hernia is noted. Heart size is normal.     Impression: 1. No gallbladder abnormalities identified. No abnormal biliary dilation. 2. Additional chronic findings include: Stable 4 Ramos right lower lobe lung nodule since 2019, lumbar dextro scoliosis, multilevel lumbar laminectomies, hysterectomy, rectocele, sparse uncomplicated colonic diverticulosis. Electronically Signed: Lisa Cali  3/24/2023 5:48 PM EDT  Workstation ID: [id number]      Results for orders placed during the hospital encounter of 07/13/20    Doppler Arterial Multi Level Lower Extremity - Bilateral CAR    Interpretation Summary  ·  Right Conclusion: The right SHAJI is normal.  · Left Conclusion: The left SHAJI is normal.      Results for orders placed during the hospital encounter of 07/13/20    Doppler Arterial Multi Level Lower Extremity - Bilateral CAR    Interpretation Summary  · Right Conclusion: The right SHAJI is normal.  · Left Conclusion: The left SHAJI is normal.      Results for orders placed during the hospital encounter of 11/25/19    Adult Transthoracic Echo Complete W/ Cont if Necessary Per Protocol    Interpretation Summary  · Left ventricular systolic function is normal.  · Estimated EF appears to be in the range of 66 - 70%.  · Left ventricular diastolic dysfunction (grade I a) consistent with impaired relaxation.  · Bicuspid aortic valve with mild aortic valve stenosis.  · Borderline dilation of the proximal ascending aorta measuring 3.5 cm, poorly visualized.      Plan for Follow-up of Pending Labs/Results:     Discharge Details        Discharge Medications      Changes to Medications      Instructions Start Date   ondansetron 4 MG tablet  Commonly known as: ZOFRAN  What changed: Another medication with the same name was removed. Continue taking this medication, and follow the directions you see here.   4 mg, Oral, Every 8 Hours PRN         Continue These Medications      Instructions Start Date   Accu-Chek FastClix Lancets misc   No dose, route, or frequency recorded.      acetaminophen 500 MG tablet  Commonly known as: TYLENOL   500 mg, Oral, Every 6 Hours PRN      aspirin  MG tablet   325 mg, Oral, Daily, Patient states Dr. Graves told her not to stop prior to surgery      calcium carbonate 600 MG tablet  Commonly known as: OS-YASMANY   600 mg, Oral, Daily      gabapentin 100 MG capsule  Commonly known as: NEURONTIN   100 mg, Oral, Nightly      Hydroxychloroquine Sulfate 100 MG tablet   Oral      hyoscyamine 0.125 MG SL tablet  Commonly known as: LEVSIN   No dose, route, or frequency recorded.      levothyroxine 75 MCG  tablet  Commonly known as: SYNTHROID, LEVOTHROID   Oral, Daily      magnesium gluconate 500 MG tablet  Commonly known as: MAGONATE   500 mg, Oral, Daily      multivitamin tablet tablet  Commonly known as: THERAGRAN   1 tablet, Oral, Daily      niacin 500 MG tablet   Oral, Daily      omeprazole 40 MG capsule  Commonly known as: priLOSEC   No dose, route, or frequency recorded.      potassium phosphate (monobasic) 500 MG tablet  Commonly known as: K-PHOS   500 mg, Oral, Daily      rosuvastatin 40 MG tablet  Commonly known as: CRESTOR   Oral, Daily      SALINE MIST 0.65 % nasal spray  Generic drug: sodium chloride   No dose, route, or frequency recorded.      triamterene-hydrochlorothiazide 37.5-25 MG per tablet  Commonly known as: MAXZIDE-25   No dose, route, or frequency recorded.      vitamin D3 125 MCG (5000 UT) capsule capsule   5,000 Units, Oral, Daily             Allergies   Allergen Reactions   • Erythromycin Rash         Discharge Disposition:  Home or Self Care    Diet:  Hospital:  Diet Order   Procedures   • Diet: Regular/House Diet, Diabetic Diets; Consistent Carbohydrate; Texture: Soft to Chew (NDD 3); Soft to Chew: Chopped Meat; Fluid Consistency: Thin (IDDSI 0)       Activity:      Restrictions or Other Recommendations:         CODE STATUS:    Code Status and Medical Interventions:   Ordered at: 03/24/23 1951     Code Status (Patient has no pulse and is not breathing):    CPR (Attempt to Resuscitate)     Medical Interventions (Patient has pulse or is breathing):    Full Support       Future Appointments   Date Time Provider Department Center   3/29/2023  2:50 PM Jhony Griffin MD [facility]       Additional Instructions for the Follow-ups that You Need to Schedule     Discharge Follow-up with PCP   As directed       Currently Documented PCP:    Mina Barnett MD    PCP Phone Number:    [phone]     Follow Up Details: follow up with PCP Dr Barnett in one week         Discharge Follow-up with  Specialty: follow up with Gastroenterology Dr Hairston, next available, pancreatitis   As directed      Specialty: follow up with Gastroenterology Dr Hairston, next available, pancreatitis                     Peng Goetz MD  03/26/23      Time Spent on Discharge:  I spent  35  minutes on this discharge activity which included: face-to-face encounter with the patient, reviewing the data in the system, coordination of the care with the nursing staff as well as consultants, documentation, and entering orders.

## 2023-03-26 NOTE — PLAN OF CARE
Goal Outcome Evaluation:           Progress: improving  Outcome Evaluation: Patient is being discharged at this time. Peripheral IV has been removed. VSS, and patient has been alert and oriented times four. All of patients belongings gathered to be sent with the patient. Discharge paperwork printed off and gone over with the patient.

## 2023-03-26 NOTE — PLAN OF CARE
Goal Outcome Evaluation:              Outcome Evaluation: VSS, no c/o pain, up in room ambulating to bathroom.  Will continue with plan of care.

## 2023-03-27 NOTE — OUTREACH NOTE
Prep Survey    Flowsheet Row Responses   [facility] facility patient discharged from? [place]   Is LACE score < 7 ? No   Eligibility Readm Mgmt   Discharge diagnosis Pancreatitis PVD (peripheral vascular disease   Does the patient have one of the following disease processes/diagnoses(primary or secondary)? Other   Does the patient have Home health ordered? No   Is there a DME ordered? No   Prep survey completed? Yes          TRI BOOTHE - Registered Nurse

## 2023-03-29 ENCOUNTER — CLINICAL SUPPORT (OUTPATIENT)
Dept: ORTHOPEDIC SURGERY | Facility: CLINIC | Age: 83
End: 2023-03-29
Payer: MEDICARE

## 2023-03-29 DIAGNOSIS — M17.11 PRIMARY OSTEOARTHRITIS OF RIGHT KNEE: Primary | ICD-10-CM

## 2023-03-29 NOTE — PROGRESS NOTES
Procedure   Large Joint Arthrocentesis: R knee  Date/Time: 3/29/2023 2:27 PM  Consent given by: patient  Site marked: site marked  Timeout: Immediately prior to procedure a time out was called to verify the correct patient, procedure, equipment, support staff and site/side marked as required   Supporting Documentation  Indications: pain   Procedure Details  Location: knee - R knee  Preparation: Patient was prepped and draped in the usual sterile fashion  Needle size: 22 G  Approach: anterolateral  Medications administered: 30 mg Hyaluronan 30 MG/2ML  Patient tolerance: patient tolerated the procedure well with no immediate complications

## 2023-03-29 NOTE — PROGRESS NOTES
[facility] Clinic Note    Subjective     Chief Complaint   Patient presents with   • Follow-up     Right knee orthovisc #3 injection        HPI    Danay Gonzales is a 82 y.o. female who presents for the third Orthovisc injection in the right knee.  Of note, she has seen some improvement.    Patient Active Problem List   Diagnosis   • PVD (peripheral vascular disease) (HCC)   • Lumbar stenosis with neurogenic claudication   • Spondylosis of lumbar region without myelopathy or radiculopathy   • Sacroiliac joint dysfunction of left side   • Gait disturbance   • At high risk for falls   • Degeneration of lumbar or lumbosacral intervertebral disc   • Anemia with chronic illness   • Pancreatitis   • CKD (chronic kidney disease) stage 3, GFR 30-59 ml/min (HCC)   • Hypothyroidism   • Type 2 diabetes mellitus (HCC)   • Essential hypertension   • Hyperlipidemia   • GERD without esophagitis     Past Medical History:   Diagnosis Date   • Anemia    • Arthritis    • Cancer (HCC)     skin   • Chronic constipation    • Diabetes mellitus (HCC)     borderline - no medications   • Disease of thyroid gland    • GERD (gastroesophageal reflux disease)    • Glaucoma    • Headache    • Hearing aid worn    • History of bronchitis    • History of migraine    • History of pneumonia    • Hypertension    • Kidney disease    • Low back pain    • Lumbar stenosis    • Menopause    • Mumps    • Osteoporosis    • PVD (peripheral vascular disease) (HCC)    • Stroke (HCC)     right hand weakness    • Vertigo    • Wears glasses       Past Surgical History:   Procedure Laterality Date   • BREAST BIOPSY Bilateral     BOTH BREASTS US BX, PT DOES NOT REMEMBER WHEN   • CATARACT EXTRACTION, BILATERAL     • COLONOSCOPY  10/2016   • HYSTERECTOMY  1981    Complete   • LUMBAR LAMINECTOMY DISCECTOMY DECOMPRESSION N/A 11/24/2020    Procedure: LUMBAR LAMINECTOMY L3- S1;  Surgeon: Jae Graves MD;  Location: [facility];  Service: Neurosurgery;   Laterality: N/A;   • OOPHORECTOMY      Comnplete Hysterectomy   • OTHER SURGICAL HISTORY  1999    Scalp   • SKIN CANCER EXCISION Left 2005    Hand   • TONSILLECTOMY  1944   • UPPER GASTROINTESTINAL ENDOSCOPY  10/2016    Esophageal Stretching      Family History   Problem Relation Age of Onset   • Heart disease Mother    • Heart attack Mother    • Stroke Mother    • Lung disease Father    • Heart disease Sister    • Cancer Sister    • Heart disease Brother    • Stroke Brother    • Cancer Sister    • Cancer Brother    • Stroke Brother    • Heart disease Brother    • Heart disease Brother    • Lung disease Sister    • COPD Sister    • Emphysema Sister    • Breast cancer Neg Hx    • Ovarian cancer Neg Hx      Social History     Socioeconomic History   • Marital status:    Tobacco Use   • Smoking status: Never   • Smokeless tobacco: Never   Vaping Use   • Vaping Use: Never used   Substance and Sexual Activity   • Alcohol use: No   • Drug use: No   • Sexual activity: Defer      Current Outpatient Medications on File Prior to Visit   Medication Sig Dispense Refill   • Accu-Chek FastClix Lancets misc      • acetaminophen (TYLENOL) 500 MG tablet Take 1 tablet by mouth Every 6 (Six) Hours As Needed.     • aspirin  MG tablet Take 1 tablet by mouth Daily. Patient states Dr. Graves told her not to stop prior to surgery     • calcium carbonate (OS-YASMANY) 600 MG tablet Take 1 tablet by mouth Daily.     • Cholecalciferol (VITAMIN D3) 125 MCG (5000 UT) capsule capsule Take 1 capsule by mouth Daily.     • gabapentin (NEURONTIN) 100 MG capsule Take 1 capsule by mouth Every Night. 30 capsule 0   • Hydroxychloroquine Sulfate 100 MG tablet Take  by mouth.     • hyoscyamine (LEVSIN) 0.125 MG SL tablet      • levothyroxine (SYNTHROID, LEVOTHROID) 75 MCG tablet Take  by mouth Daily.     • magnesium gluconate (MAGONATE) 500 MG tablet Take 500 mg by mouth Daily.     • Multiple Vitamin (MULTI-VITAMIN DAILY PO) Take 1 tablet by  mouth Daily.     • niacin 500 MG tablet Take  by mouth Daily.     • omeprazole (priLOSEC) 40 MG capsule      • ondansetron (ZOFRAN) 4 MG tablet Take 1 tablet by mouth Every 8 (Eight) Hours As Needed for Nausea or Vomiting.     • potassium phosphate, monobasic, (K-PHOS) 500 MG tablet Take 1 tablet by mouth Daily.     • rosuvastatin (CRESTOR) 40 MG tablet Take  by mouth Daily.     • SALINE MIST 0.65 % nasal spray      • triamterene-hydrochlorothiazide (MAXZIDE-25) 37.5-25 MG per tablet        No current facility-administered medications on file prior to visit.      Allergies   Allergen Reactions   • Erythromycin Rash        Review of Systems   Constitutional: Negative for activity change, appetite change, chills, diaphoresis, fatigue, fever and unexpected weight change.   HENT: Negative for congestion, dental problem, drooling, ear discharge, ear pain, facial swelling, hearing loss, mouth sores, nosebleeds, postnasal drip, rhinorrhea, sinus pressure, sneezing, sore throat, tinnitus, trouble swallowing and voice change.    Eyes: Negative for photophobia, pain, discharge, redness, itching and visual disturbance.   Respiratory: Negative for apnea, cough, choking, chest tightness, shortness of breath, wheezing and stridor.    Cardiovascular: Negative for chest pain, palpitations and leg swelling.   Gastrointestinal: Negative for abdominal distention, abdominal pain, anal bleeding, blood in stool, constipation, diarrhea, nausea, rectal pain and vomiting.   Endocrine: Negative for cold intolerance, heat intolerance, polydipsia, polyphagia and polyuria.   Genitourinary: Negative for decreased urine volume, difficulty urinating, dysuria, enuresis, flank pain, frequency, genital sores, hematuria and urgency.   Musculoskeletal: Positive for arthralgias. Negative for back pain, gait problem, joint swelling, myalgias, neck pain and neck stiffness.   Skin: Negative for color change, pallor, rash and wound.   Allergic/Immunologic:  Negative for environmental allergies, food allergies and immunocompromised state.   Neurological: Negative for dizziness, tremors, seizures, syncope, facial asymmetry, speech difficulty, weakness, light-headedness, numbness and headaches.   Hematological: Negative for adenopathy. Does not bruise/bleed easily.   Psychiatric/Behavioral: Negative for agitation, behavioral problems, confusion, decreased concentration, dysphoric mood, hallucinations, self-injury, sleep disturbance and suicidal ideas. The patient is not nervous/anxious and is not hyperactive.         Objective      Physical Exam  There were no vitals taken for this visit.    There is no height or weight on file to calculate BMI.    General:   Mental Status:  Alert   Appearance: Cooperative, in no acute distress   Posture: Normal    Assessment and Plan     Diagnoses and all orders for this visit:    1. Primary osteoarthritis of right knee (Primary)  -     Large Joint Arthrocentesis: R knee        1. Primary osteoarthritis of right knee        Procedure Note:  The potential benefits of performing a therapeutic knee joint visco supplementation injection, as well as potential risks (including, but not limited to infection, swelling, pain, bleeding, bruising, nerve/blood vessel damage, and pseudoseptic reaction) have been discussed with the patient.  After informed consent, timeout procedure was performed, and the skin on the right knee was prepped with chlorhexidine soap and alcohol, after which ethyl chloride was applied to the skin at the injection site. Via the anterolateral approach, Orthovisc was injected into the knee joint.  The patient tolerated the procedure well. There were no complications.  Band-Aid was applied to the injection site. Post-procedural instructions discussed with the patient and/or their caregiver.    Return in about 6 months (around 9/29/2023).    Jhony Griffin MD  03/29/23  14:31 EDT

## 2023-03-30 ENCOUNTER — READMISSION MANAGEMENT (OUTPATIENT)
Dept: CALL CENTER | Facility: HOSPITAL | Age: 83
End: 2023-03-30
Payer: MEDICARE

## 2023-03-30 NOTE — OUTREACH NOTE
Medical Week 1 Survey    Flowsheet Row Responses   [facility] patient discharged from? [facility]   Does the patient have one of the following disease processes/diagnoses(primary or secondary)? Other   Week 1 attempt successful? Yes   Call start time 0950   Call end time [phone]   Discharge diagnosis Pancreatitis PVD (peripheral vascular disease)   Meds reviewed with patient/caregiver? Yes   Does the patient have all medications ordered at discharge? N/A   Is the patient taking all medications as directed (includes completed medication regime)? Yes   Comments regarding appointments Appt with Dr. Griffin is on 3/29/23,  nephrology appt is on 3/31/23   Does the patient have a primary care provider?  Yes   Comments regarding PCP 4/3/23   Has the patient kept scheduled appointments due by today? Yes   Psychosocial issues? No   Psychosocial comments Lives with son   Did the patient receive a copy of their discharge instructions? Yes   Nursing interventions Reviewed instructions with patient   What is the patient's perception of their health status since discharge? Improving  [No pain]   Is the patient/caregiver able to teach back signs and symptoms related to disease process for when to call PCP? Yes   Is the patient/caregiver able to teach back signs and symptoms related to disease process for when to call 911? Yes   Is the patient/caregiver able to teach back the hierarchy of who to call/visit for symptoms/problems? PCP, Specialist, Home health nurse, Urgent Care, ED, 911 Yes   If the patient is a current smoker, are they able to teach back resources for cessation? Not a smoker   Week 1 call completed? Yes   Is the patient interested in additional calls from an ambulatory ?  NOTE:  applies to high risk patients requiring additional follow-up. No          Sena ZACARIAS - Registered Nurse

## 2023-04-01 NOTE — PROGRESS NOTES
"Enter Query Response Below      Query Response: renal insufficiency  Electronically signed by Peng Goetz MD, 23, 2:36 PM EDT.               If applicable, please update the problem list.   Patient: Danay Gonzales        : 1940  Account: [id number]           Admit Date: 3/24/2023        How to Respond to this query:       a. Click New Note     b. Answer query within the yellow box.                c. Update the Problem List, if applicable.      If you have any questions about this query contact me at: [email or web address]    ,     The 3/24/23 H&P Notes CKD stage 3 and MARY.  The 3/26/23 Discharge Summary notes CKD Stage 3 and \"renal insufficiency-appears near baseline, creatinine 1.4 - consider stopping triamterene and placing patient on HCTZ monotherapy.\"  Treatment included home HCTZ held, avoided nephrotoxic agents, a Normal Saline Bolus 1 Liter 3/24, and continuous IV fluids 3/24-3/25.     3/24/23 Creatinine 1.72  3/25/23 Creatinine 1.42  3/26/23 Creatinine 1.32    Both renal insufficiency and MARY are noted.  After study, can patient's condition be clarified as:     MARY   Renal insufficiency  Other ________________________  Unable to further clarify      By submitting this query, we are merely seeking further clarification of documentation to accurately reflect all conditions that you are monitoring, evaluating, treating or that extend the hospitalization or utilize additional resources of care. Please utilize your independent clinical judgment when addressing the question(s) above.     This query and your response, once completed, will be entered into the legal medical record.    Sincerely,  Felicita CALLOWAY, RN, CDIS  Clinical Documentation Integrity Program   [email or web address]  "

## 2023-04-03 NOTE — TELEPHONE ENCOUNTER
Provider: DR. REYES  Caller: PATIENT  Reason for Call: PATIENT ALREADY SCHEDULED PRIOR TO CALL BUT CALLED REQUESTING MEDICAL ADVICE TOWARDS CONSTANT MUSCLE CRAMPS IN BOTH LEGS THAT HAVE BEEN UNCOMFORTABLE & CAUSING PATIENT TO BE RESTLESS. PATIENT IS NO INTERESTED IN ANY PAIN MEDS & DOES NOT WANT TO CONTINUE TO TAKE TYLENOL AS OFTEN AS SHE DOES. PATIENT INQUIRNG IF THERE ARE ANY OTHER OTC MEDS AVAILABLE AS WELL AS WHAT TO EAT AND/OR DRINK TO HELP RELIEVE SYMPTOMS  When was the patient last seen: SURGERY 11/24  When did it start: SINCE COMING HOME FROM SURGERY  Where is it located: BOTH LEGS  Characteristics of symptom/severity: MUSCLE CRAMPS & DISCOMFORT  Timing- Is it constant or intermittent: CONSTANT  What therapies/medications have you tried: HEAT, FREE-BREEZE?, TYLENOL    PATIENT CAN BE CONTACTED -[phone] WITH INSTRUCTIONS     Walk in

## 2023-04-25 ENCOUNTER — OFFICE VISIT (OUTPATIENT)
Dept: GASTROENTEROLOGY | Facility: CLINIC | Age: 83
End: 2023-04-25
Payer: MEDICARE

## 2023-04-25 VITALS
HEART RATE: 78 BPM | TEMPERATURE: 97.3 F | BODY MASS INDEX: 24.04 KG/M2 | DIASTOLIC BLOOD PRESSURE: 90 MMHG | OXYGEN SATURATION: 100 % | WEIGHT: 115 LBS | SYSTOLIC BLOOD PRESSURE: 130 MMHG

## 2023-04-25 DIAGNOSIS — K85.00 IDIOPATHIC ACUTE PANCREATITIS, UNSPECIFIED COMPLICATION STATUS: Primary | ICD-10-CM

## 2023-04-25 PROCEDURE — 1159F MED LIST DOCD IN RCRD: CPT | Performed by: NURSE PRACTITIONER

## 2023-04-25 PROCEDURE — 99213 OFFICE O/P EST LOW 20 MIN: CPT | Performed by: NURSE PRACTITIONER

## 2023-04-25 PROCEDURE — 3080F DIAST BP >= 90 MM HG: CPT | Performed by: NURSE PRACTITIONER

## 2023-04-25 PROCEDURE — 3075F SYST BP GE 130 - 139MM HG: CPT | Performed by: NURSE PRACTITIONER

## 2023-04-25 PROCEDURE — 1160F RVW MEDS BY RX/DR IN RCRD: CPT | Performed by: NURSE PRACTITIONER

## 2023-04-25 RX ORDER — ONDANSETRON HYDROCHLORIDE 8 MG/1
TABLET, FILM COATED ORAL
COMMUNITY
Start: 2023-01-20

## 2023-04-25 RX ORDER — HYDROXYCHLOROQUINE SULFATE 200 MG/1
TABLET, FILM COATED ORAL
COMMUNITY
Start: 2023-03-27

## 2023-04-25 NOTE — PROGRESS NOTES
"     Follow Up      Patient Name: Danay Gonzales  : 1940   MRN: [id number]     Chief Complaint:    Chief Complaint   Patient presents with   • Follow-up       History of Present Illness: Danay Gonzales is a 82 y.o. female who is here today for follow up on pancreatitis.    Danay was admitted to [facility] last month with acute pancreatitis.  Patient had a lipase greater than 3000 on admission with a normal CT abdomen.  She was treated symptomatically with a recommended follow-up. Repeat lipase has trended down.  No cause was found.     Since discharge her appetite is normal.  No nausea or vomiting. Her abdominal pain has mostly resolved.  She has a few \"twinges\" of abdominal pain here and there.  Has gradually been losing weight over the past 5 years.  No weight loss in the past 18 months.  Had labs yesterday with pcp- does not have results yet    Regarding her esophageal dysphagia, she takes hyoscyamine before meals. Eats small frequent meals.  Has a history of IBS with alternating bowel habits.     CT Abdomen Pelvis Without Contrast (2023 17:40)1. No gallbladder abnormalities identified. No abnormal biliary dilation.  2. Additional chronic findings include: Stable 4 Ramos right lower lobe lung nodule since 2019, lumbar dextro scoliosis, multilevel lumbar laminectomies, hysterectomy, rectocele, sparse uncomplicated colonic diverticulosis      SCANNED - COLONOSCOPY (10/04/2021)-diverticulosis. Normal colon biopsies  ENDOSCOPY, INT (2021)-abnormal esophageal motility (cork screw esophagus, diverticulum)-dilated, gastritis. Biopsies neg for h pylori     Subjective      Review of Systems:   Review of Systems   Constitutional: Negative for appetite change and unexpected weight loss.   HENT: Positive for trouble swallowing.    Gastrointestinal: Positive for abdominal pain, constipation, diarrhea and GERD. Negative for abdominal distention, anal bleeding, blood in stool, nausea, rectal " pain, vomiting and indigestion.       Medications:     Current Outpatient Medications:   •  Accu-Chek FastClix Lancets misc, , Disp: , Rfl:   •  acetaminophen (TYLENOL) 500 MG tablet, Take 1 tablet by mouth Every 6 (Six) Hours As Needed., Disp: , Rfl:   •  aspirin  MG tablet, Take 1 tablet by mouth Daily. Patient states Dr. Graves told her not to stop prior to surgery, Disp: , Rfl:   •  calcium carbonate (OS-YASMANY) 600 MG tablet, Take 1 tablet by mouth Daily., Disp: , Rfl:   •  Cholecalciferol (VITAMIN D3) 125 MCG (5000 UT) capsule capsule, Take 1 capsule by mouth Daily., Disp: , Rfl:   •  gabapentin (NEURONTIN) 100 MG capsule, Take 1 capsule by mouth Every Night., Disp: 30 capsule, Rfl: 0  •  hydroxychloroquine (PLAQUENIL) 200 MG tablet, , Disp: , Rfl:   •  Hydroxychloroquine Sulfate 100 MG tablet, Take  by mouth., Disp: , Rfl:   •  hyoscyamine (LEVSIN) 0.125 MG SL tablet, , Disp: , Rfl:   •  levothyroxine (SYNTHROID, LEVOTHROID) 75 MCG tablet, Take  by mouth Daily., Disp: , Rfl:   •  magnesium gluconate (MAGONATE) 500 MG tablet, Take 500 mg by mouth Daily., Disp: , Rfl:   •  Multiple Vitamin (MULTI-VITAMIN DAILY PO), Take 1 tablet by mouth Daily., Disp: , Rfl:   •  niacin 500 MG tablet, Take  by mouth Daily., Disp: , Rfl:   •  omeprazole (priLOSEC) 40 MG capsule, , Disp: , Rfl:   •  ondansetron (ZOFRAN) 4 MG tablet, Take 1 tablet by mouth Every 8 (Eight) Hours As Needed for Nausea or Vomiting., Disp: , Rfl:   •  ondansetron (ZOFRAN) 8 MG tablet, Take  by mouth., Disp: , Rfl:   •  potassium phosphate, monobasic, (K-PHOS) 500 MG tablet, Take 1 tablet by mouth Daily., Disp: , Rfl:   •  rosuvastatin (CRESTOR) 40 MG tablet, Take  by mouth Daily., Disp: , Rfl:   •  SALINE MIST 0.65 % nasal spray, , Disp: , Rfl:   •  triamterene-hydrochlorothiazide (MAXZIDE-25) 37.5-25 MG per tablet, , Disp: , Rfl:     Allergies:   Allergies   Allergen Reactions   • Erythromycin Rash       Social History:   Social History      Socioeconomic History   • Marital status:    Tobacco Use   • Smoking status: Never   • Smokeless tobacco: Never   Vaping Use   • Vaping Use: Never used   Substance and Sexual Activity   • Alcohol use: No   • Drug use: No   • Sexual activity: Defer        Surgical History:   Past Surgical History:   Procedure Laterality Date   • BREAST BIOPSY Bilateral     BOTH BREASTS US BX, PT DOES NOT REMEMBER WHEN   • CATARACT EXTRACTION, BILATERAL     • COLONOSCOPY  10/2016   • HYSTERECTOMY  1981    Complete   • LUMBAR LAMINECTOMY DISCECTOMY DECOMPRESSION N/A 11/24/2020    Procedure: LUMBAR LAMINECTOMY L3- S1;  Surgeon: Jae Graves MD;  [facility];  Service: Neurosurgery;  Laterality: N/A;   • OOPHORECTOMY      Comnplete Hysterectomy   • OTHER SURGICAL HISTORY  1999    Scalp   • SKIN CANCER EXCISION Left 2005    Hand   • TONSILLECTOMY  1944   • UPPER GASTROINTESTINAL ENDOSCOPY  10/2016    Esophageal Stretching        Medical History:   Past Medical History:   Diagnosis Date   • Anemia    • Arthritis    • Cancer     skin   • Chronic constipation    • Diabetes mellitus     borderline - no medications   • Disease of thyroid gland    • GERD (gastroesophageal reflux disease)    • Glaucoma    • Headache    • Hearing aid worn    • History of bronchitis    • History of migraine    • History of pneumonia    • Hypertension    • Kidney disease    • Low back pain    • Lumbar stenosis    • Menopause    • Mumps    • Osteoporosis    • PVD (peripheral vascular disease)    • Stroke     right hand weakness    • Vertigo    • Wears glasses         Objective     Physical Exam:  Vital Signs:   Vitals:    04/25/23 1536   BP: 130/90   Pulse: 78   Temp: 97.3 °F (36.3 °C)   SpO2: 100%   Weight: 52.2 kg (115 lb)     Body mass index is 24.04 kg/m².     Physical Exam  Constitutional:       General: She is not in acute distress.     Appearance: She is well-developed.   Eyes:      General: No scleral icterus.  Pulmonary:      Effort:  Pulmonary effort is normal. No accessory muscle usage or respiratory distress.   Abdominal:      General: Bowel sounds are normal. There is no distension.      Palpations: Abdomen is soft.      Tenderness: There is no abdominal tenderness. There is no guarding.   Skin:     Coloration: Skin is not pale.      Findings: No erythema.   Neurological:      Mental Status: She is alert and oriented to person, place, and time.   Psychiatric:         Speech: Speech normal.         Behavior: Behavior normal.         Thought Content: Thought content normal.         Judgment: Judgment normal.         Assessment / Plan      Assessment/Plan:   Diagnoses and all orders for this visit:    1. Idiopathic acute pancreatitis, unspecified complication status (Primary)  -     Cancel: Comprehensive Metabolic Panel; Future  -     CT Abdomen Without Contrast; Future  -     Cancel: CBC & Differential; Future  -     Cancel: Lipase; Future    No identifiable cause found- etiology idopathic.  If recurs, will need further testing. symptoms have mostly resolved.  I recommend repeating a CT abdomen in a few weeks to reassess the pancreas.  We will request labs from her primary care.  Notify me with return of symptoms or new/concerning symptoms    Follow Up:   No follow-ups on file.    Plan of care reviewed with the patient at the conclusion of today's visit.  Education was provided regarding diagnosis, management, and any prescribed or recommended OTC medications.  Patient verbalized understanding of and agreement with management plan.        REBECA Hebert  [facility]

## 2023-05-03 ENCOUNTER — TELEPHONE (OUTPATIENT)
Dept: GASTROENTEROLOGY | Facility: CLINIC | Age: 83
End: 2023-05-03
Payer: MEDICARE

## 2023-05-08 LAB
QT INTERVAL: 358 MS
QTC INTERVAL: 442 MS

## 2024-04-22 ENCOUNTER — TRANSCRIBE ORDERS (OUTPATIENT)
Dept: ADMINISTRATIVE | Facility: HOSPITAL | Age: 84
End: 2024-04-22
Payer: MEDICARE

## 2024-04-22 DIAGNOSIS — Z12.31 SCREENING MAMMOGRAM FOR BREAST CANCER: Primary | ICD-10-CM

## 2024-05-23 ENCOUNTER — HOSPITAL ENCOUNTER (OUTPATIENT)
Dept: MAMMOGRAPHY | Facility: HOSPITAL | Age: 84
Discharge: HOME | End: 2024-05-23
Admitting: INTERNAL MEDICINE
Payer: MEDICARE

## 2024-05-23 DIAGNOSIS — Z12.31 SCREENING MAMMOGRAM FOR BREAST CANCER: ICD-10-CM

## 2024-05-23 PROCEDURE — 77067 SCR MAMMO BI INCL CAD: CPT

## 2024-05-23 PROCEDURE — 77063 BREAST TOMOSYNTHESIS BI: CPT

## 2024-09-11 ENCOUNTER — OFFICE VISIT (OUTPATIENT)
Dept: ORTHOPEDIC SURGERY | Facility: CLINIC | Age: 84
End: 2024-09-11
Payer: MEDICARE

## 2024-09-11 VITALS
BODY MASS INDEX: 23.43 KG/M2 | SYSTOLIC BLOOD PRESSURE: 140 MMHG | DIASTOLIC BLOOD PRESSURE: 62 MMHG | HEIGHT: 58 IN | WEIGHT: 111.6 LBS

## 2024-09-11 DIAGNOSIS — M17.11 PRIMARY OSTEOARTHRITIS OF RIGHT KNEE: Primary | ICD-10-CM

## 2024-09-11 PROCEDURE — 3078F DIAST BP <80 MM HG: CPT | Performed by: ORTHOPAEDIC SURGERY

## 2024-09-11 PROCEDURE — 1160F RVW MEDS BY RX/DR IN RCRD: CPT | Performed by: ORTHOPAEDIC SURGERY

## 2024-09-11 PROCEDURE — 20610 DRAIN/INJ JOINT/BURSA W/O US: CPT | Performed by: ORTHOPAEDIC SURGERY

## 2024-09-11 PROCEDURE — 3077F SYST BP >= 140 MM HG: CPT | Performed by: ORTHOPAEDIC SURGERY

## 2024-09-11 PROCEDURE — 99213 OFFICE O/P EST LOW 20 MIN: CPT | Performed by: ORTHOPAEDIC SURGERY

## 2024-09-11 PROCEDURE — 1159F MED LIST DOCD IN RCRD: CPT | Performed by: ORTHOPAEDIC SURGERY

## 2024-09-11 RX ORDER — PANTOPRAZOLE SODIUM 40 MG/1
TABLET, DELAYED RELEASE ORAL
COMMUNITY
Start: 2024-02-15

## 2024-09-11 RX ORDER — ROPIVACAINE HYDROCHLORIDE 5 MG/ML
4 INJECTION, SOLUTION EPIDURAL; INFILTRATION; PERINEURAL
Status: COMPLETED | OUTPATIENT
Start: 2024-09-11 | End: 2024-09-11

## 2024-09-11 RX ORDER — MECLIZINE HCL 12.5 MG 12.5 MG/1
TABLET ORAL
COMMUNITY
Start: 2024-04-04

## 2024-09-11 RX ORDER — TRAMADOL HYDROCHLORIDE 50 MG/1
50 TABLET ORAL
COMMUNITY
Start: 2024-05-02

## 2024-09-11 RX ORDER — GUAIFENESIN 600 MG/1
1200 TABLET, EXTENDED RELEASE ORAL
COMMUNITY
Start: 2024-03-04

## 2024-09-11 RX ORDER — TRIAMCINOLONE ACETONIDE 40 MG/ML
40 INJECTION, SUSPENSION INTRA-ARTICULAR; INTRAMUSCULAR
Status: COMPLETED | OUTPATIENT
Start: 2024-09-11 | End: 2024-09-11

## 2024-09-11 RX ADMIN — TRIAMCINOLONE ACETONIDE 40 MG: 40 INJECTION, SUSPENSION INTRA-ARTICULAR; INTRAMUSCULAR at 12:08

## 2024-09-11 RX ADMIN — ROPIVACAINE HYDROCHLORIDE 4 ML: 5 INJECTION, SOLUTION EPIDURAL; INFILTRATION; PERINEURAL at 12:08

## 2024-09-11 NOTE — PROGRESS NOTES
[facility]    Subjective     Chief Complaint   Patient presents with    Follow-up     7.5 month follow up--Primary osteoarthritis of right knee        HPI    It has been 7.5  months since Ms. Gonzales's last visit. She returns to clinic today for follow-up of right knee pain. The issue has been ongoing for 25 year(s). She rates her pain a 7/10 on the pain scale. Previous/current treatments: bracing, physical therapy, viscosupplementation (last injection 03-26-23), and steroid injection (last injection 04-18-22). Current symptoms: pain, swelling, and stiffness. The pain is worse with  extended standing ; heat and pain medication and/or NSAID improve the pain. Overall, she is doing worse.  She is interested in a steroid injection today.  She tried viscosupplementation injections in the past with minimal improvement.      I have reviewed the following portions of the patient's history and agree with: History of Present Illness and Review of Systems    Patient Active Problem List   Diagnosis    PVD (peripheral vascular disease)    Lumbar stenosis with neurogenic claudication    Spondylosis of lumbar region without myelopathy or radiculopathy    Sacroiliac joint dysfunction of left side    Gait disturbance    At high risk for falls    Degeneration of lumbar or lumbosacral intervertebral disc    Anemia with chronic illness    Pancreatitis    CKD (chronic kidney disease) stage 3, GFR 30-59 ml/min    Hypothyroidism    Type 2 diabetes mellitus    Essential hypertension    Hyperlipidemia    GERD without esophagitis     Past Medical History:   Diagnosis Date    Anemia     Arthritis     Cancer     skin    Chronic constipation     Diabetes mellitus     borderline - no medications    Disease of thyroid gland     GERD (gastroesophageal reflux disease)     Glaucoma     Headache     Hearing aid worn     History of bronchitis     History of migraine     History of pneumonia     Hypertension     Kidney  disease     Low back pain     Lumbar stenosis     Menopause     Mumps     Osteoporosis     PVD (peripheral vascular disease)     Stroke     right hand weakness     Vertigo     Wears glasses       Past Surgical History:   Procedure Laterality Date    BREAST BIOPSY Bilateral     BOTH BREASTS US BX, PT DOES NOT REMEMBER WHEN    CATARACT EXTRACTION, BILATERAL      COLONOSCOPY  10/2016    HYSTERECTOMY  1981    Complete    LUMBAR LAMINECTOMY DISCECTOMY DECOMPRESSION N/A 11/24/2020    Procedure: LUMBAR LAMINECTOMY L3- S1;  Surgeon: Jae Graves MD;  Location: [facility];  Service: Neurosurgery;  Laterality: N/A;    OOPHORECTOMY      Comnplete Hysterectomy    OTHER SURGICAL HISTORY  1999    Scalp    SKIN CANCER EXCISION Left 2005    Hand    TONSILLECTOMY  1944    UPPER GASTROINTESTINAL ENDOSCOPY  10/2016    Esophageal Stretching      Family History   Problem Relation Age of Onset    Heart disease Mother     Heart attack Mother     Stroke Mother     Lung disease Father     Heart disease Sister     Cancer Sister     Heart disease Brother     Stroke Brother     Cancer Sister     Cancer Brother     Stroke Brother     Heart disease Brother     Heart disease Brother     Lung disease Sister     COPD Sister     Emphysema Sister     Breast cancer Neg Hx     Ovarian cancer Neg Hx      Social History     Socioeconomic History    Marital status:    Tobacco Use    Smoking status: Never    Smokeless tobacco: Never   Vaping Use    Vaping status: Never Used   Substance and Sexual Activity    Alcohol use: No    Drug use: No    Sexual activity: Defer      Current Outpatient Medications on File Prior to Visit   Medication Sig Dispense Refill    guaiFENesin (MUCINEX) 600 MG 12 hr tablet Take 2 tablets by mouth.      meclizine (ANTIVERT) 12.5 MG tablet Take  by mouth.      pantoprazole (PROTONIX) 40 MG EC tablet Take  by mouth.      traMADol (ULTRAM) 50 MG tablet Take 1 tablet by mouth.      Accu-Chek FastClix Lancets misc        acetaminophen (TYLENOL) 500 MG tablet Take 1 tablet by mouth Every 6 (Six) Hours As Needed.      aspirin  MG tablet Take 1 tablet by mouth Daily. Patient states Dr. Graves told her not to stop prior to surgery      calcium carbonate (OS-YASMANY) 600 MG tablet Take 1 tablet by mouth Daily.      Cholecalciferol (VITAMIN D3) 125 MCG (5000 UT) capsule capsule Take 1 capsule by mouth Daily.      gabapentin (NEURONTIN) 100 MG capsule Take 1 capsule by mouth Every Night. 30 capsule 0    hydroxychloroquine (PLAQUENIL) 200 MG tablet       Hydroxychloroquine Sulfate 100 MG tablet Take  by mouth.      hyoscyamine (LEVSIN) 0.125 MG SL tablet       levothyroxine (SYNTHROID, LEVOTHROID) 75 MCG tablet Take  by mouth Daily.      magnesium gluconate (MAGONATE) 500 MG tablet Take 500 mg by mouth Daily.      Multiple Vitamin (MULTI-VITAMIN DAILY PO) Take 1 tablet by mouth Daily.      niacin 500 MG tablet Take  by mouth Daily.      omeprazole (priLOSEC) 40 MG capsule       ondansetron (ZOFRAN) 8 MG tablet Take  by mouth.      potassium phosphate, monobasic, (K-PHOS) 500 MG tablet Take 1 tablet by mouth Daily.      rosuvastatin (CRESTOR) 40 MG tablet Take  by mouth Daily.      SALINE MIST 0.65 % nasal spray       triamterene-hydrochlorothiazide (MAXZIDE-25) 37.5-25 MG per tablet       [DISCONTINUED] ondansetron (ZOFRAN) 4 MG tablet Take 1 tablet by mouth Every 8 (Eight) Hours As Needed for Nausea or Vomiting.       No current facility-administered medications on file prior to visit.      Allergies   Allergen Reactions    Erythromycin Rash    Propoxyphene Unknown (See Comments)        Review of Systems   Constitutional:  Negative for activity change, appetite change, chills, diaphoresis, fatigue, fever and unexpected weight change.   HENT:  Negative for congestion, dental problem, drooling, ear discharge, ear pain, facial swelling, hearing loss, mouth sores, nosebleeds, postnasal drip, rhinorrhea, sinus pressure, sneezing, sore throat,  "tinnitus, trouble swallowing and voice change.    Eyes:  Negative for photophobia, pain, discharge, redness, itching and visual disturbance.   Respiratory:  Negative for apnea, cough, choking, chest tightness, shortness of breath, wheezing and stridor.    Cardiovascular:  Negative for chest pain, palpitations and leg swelling.   Gastrointestinal:  Negative for abdominal distention, abdominal pain, anal bleeding, blood in stool, constipation, diarrhea, nausea, rectal pain and vomiting.   Endocrine: Negative for cold intolerance, heat intolerance, polydipsia, polyphagia and polyuria.   Genitourinary:  Negative for decreased urine volume, difficulty urinating, dysuria, enuresis, flank pain, frequency, genital sores, hematuria and urgency.   Musculoskeletal:  Positive for arthralgias. Negative for back pain, gait problem, joint swelling, myalgias, neck pain and neck stiffness.   Skin:  Negative for color change, pallor, rash and wound.   Allergic/Immunologic: Negative for environmental allergies, food allergies and immunocompromised state.   Neurological:  Negative for dizziness, tremors, seizures, syncope, facial asymmetry, speech difficulty, weakness, light-headedness, numbness and headaches.   Hematological:  Negative for adenopathy. Does not bruise/bleed easily.   Psychiatric/Behavioral:  Negative for agitation, behavioral problems, confusion, decreased concentration, dysphoric mood, hallucinations, self-injury, sleep disturbance and suicidal ideas. The patient is not nervous/anxious and is not hyperactive.         Objective      Physical Exam  /62   Ht 147.3 cm (57.99\")   Wt 50.6 kg (111 lb 9.6 oz)   BMI 23.33 kg/m²     Body mass index is 23.33 kg/m².  BMI is within normal parameters. No other follow-up for BMI required.      General:   Mental Status:  Alert   Appearance: Cooperative, in no acute distress   Build and Nutrition: Well-nourished well-developed female   Orientation: Alert and oriented to " person, place and time   Posture: Normal   Gait: Nonantalgic    Integument:              Right knee: No skin lesions, no rash, no ecchymosis     Lower Extremities:              Right Knee:                          Tenderness:    Mild medial and lateral joint line tenderness                          Effusion:          None                          Swelling:          None                          Crepitus:          Positive                          Range of motion:        Extension:       0°                                                              Flexion:           125°  Instability:        No varus laxity, no valgus laxity, negative anterior drawer  Deformities:     None    Imaging/Studies  Imaging Results (Last 24 Hours)       Procedure Component Value Units Date/Time    XR Knee 4+ View Right [[id number]] Resulted: 09/11/24 1206     Updated: 09/11/24 1207    Narrative:      Right Knee Radiographs  Indication: right knee pain  Views: Standing AP's and skiers of both knees, with lateral and sunrise   views of the right knee    Comparison: 4/18/2022    Findings:    Diffuse osteopenia, valgus alignment, no acute bony abnormalities.  Mild   worsening compared to the previous imaging.  Advanced bone-on-bone contact   in the patellofemoral joint particular.  Advanced knee arthritis.                Assessment and Plan     Diagnoses and all orders for this visit:    1. Primary osteoarthritis of right knee (Primary)  -     XR Knee 4+ View Right  -     - Large Joint Arthrocentesis: R knee        1. Primary osteoarthritis of right knee        I reviewed my findings with the patient.  We discussed options again for her right knee today, and she is interested in a repeat injection.  She is not interested in surgical intervention.  I will see her back in 4 months, but sooner for any problems.    Procedure Note:  The potential benefits of performing a therapeutic right knee joint injection, as well as potential risks  (including, but not limited to infection, swelling, pain, bleeding, bruising, nerve/blood vessel damage, skin color changes, transient elevation in blood glucose levels, and fat atrophy) were discussed with the patient.  After informed consent, timeout procedure was performed, and the skin on the right knee was prepped with chlorhexidine soap and alcohol, after which ethyl chloride was applied to the skin at the injection site. Via the anterolateral approach, 1ml of Kenalog 40mg/ml mixed with 4ml 0.5% ropivacaine plain was injected into the knee joint.  The patient tolerated the procedure well, experiencing 90% improvement a few minutes following the injection. There were no complications.  Band-Aid was applied to the injection site. Post-procedural instructions were given to the patient and/or their caregiver.      Return in about 4 months (around 1/11/2025).      Jhony Griffin MD  09/11/24  12:17 EDT    Dictated Utilizing Dragon Dictation

## 2024-09-11 NOTE — PROGRESS NOTES
Procedure   - Large Joint Arthrocentesis: R knee on 9/11/2024 12:08 PM  Indications: pain  Details: 21 G needle, anterolateral approach  Medications: 4 mL ropivacaine 0.5 %; 40 mg triamcinolone acetonide 40 MG/ML  Outcome: tolerated well, no immediate complications  Procedure, treatment alternatives, risks and benefits explained, specific risks discussed. Consent was given by the patient. Immediately prior to procedure a time out was called to verify the correct patient, procedure, equipment, support staff and site/side marked as required. Patient was prepped and draped in the usual sterile fashion.

## 2025-04-29 ENCOUNTER — HOSPITAL ENCOUNTER (OUTPATIENT)
Dept: GENERAL RADIOLOGY | Facility: HOSPITAL | Age: 85
Discharge: HOME | End: 2025-04-29
Admitting: INTERNAL MEDICINE
Payer: MEDICARE

## 2025-04-29 ENCOUNTER — TRANSCRIBE ORDERS (OUTPATIENT)
Dept: GENERAL RADIOLOGY | Facility: HOSPITAL | Age: 85
End: 2025-04-29
Payer: MEDICARE

## 2025-04-29 DIAGNOSIS — R05.9 COUGH, UNSPECIFIED TYPE: ICD-10-CM

## 2025-04-29 DIAGNOSIS — R05.9 COUGH, UNSPECIFIED TYPE: Primary | ICD-10-CM

## 2025-04-29 PROCEDURE — 71046 X-RAY EXAM CHEST 2 VIEWS: CPT

## 2025-07-15 ENCOUNTER — HOSPITAL ENCOUNTER (OUTPATIENT)
Dept: GENERAL RADIOLOGY | Facility: HOSPITAL | Age: 85
Discharge: HOME | End: 2025-07-15
Admitting: INTERNAL MEDICINE
Payer: MEDICARE

## 2025-07-15 ENCOUNTER — TRANSCRIBE ORDERS (OUTPATIENT)
Dept: GENERAL RADIOLOGY | Facility: HOSPITAL | Age: 85
End: 2025-07-15
Payer: MEDICARE

## 2025-07-15 DIAGNOSIS — M25.512 LEFT SHOULDER PAIN, UNSPECIFIED CHRONICITY: ICD-10-CM

## 2025-07-15 DIAGNOSIS — M25.512 LEFT SHOULDER PAIN, UNSPECIFIED CHRONICITY: Primary | ICD-10-CM

## 2025-07-15 PROCEDURE — 73030 X-RAY EXAM OF SHOULDER: CPT

## (undated) DEVICE — HDRST INTUB GENTLETOUCH SLOT 7IN RT

## (undated) DEVICE — C-ARM DRAPE: Brand: DEROYAL

## (undated) DEVICE — ACCY PA700 LUBRICANT DIFFUSER MR7 4 PACK: Brand: MIDAS REX

## (undated) DEVICE — GLV SURG PREMIERPRO MIC LTX PF SZ6.5 BRN

## (undated) DEVICE — SUT VIC 0 CTD BR 18IN UNDYE VCP724D

## (undated) DEVICE — STRAP POSTN KN/BDY FM 5X72IN DISP

## (undated) DEVICE — ELECTRD BLD EZ CLN MOD 4IN

## (undated) DEVICE — SUT VIC PLS CTD ANTIB BR 3/0 8/18IN 45CM

## (undated) DEVICE — GLV SURG PREMIERPRO MIC LTX PF SZ7.5 BRN

## (undated) DEVICE — ADHS LIQ MASTISOL 2/3ML

## (undated) DEVICE — TOOL 14BA60 LEGEND 14CM 6MM: Brand: MIDAS REX ™

## (undated) DEVICE — 3M™ WARMING BLANKET, UPPER BODY, 10 PER CASE, 42268: Brand: BAIR HUGGER™

## (undated) DEVICE — PK NEURO DISC 10

## (undated) DEVICE — ANTIBACTERIAL UNDYED BRAIDED (POLYGLACTIN 910), SYNTHETIC ABSORBABLE SUTURE: Brand: COATED VICRYL

## (undated) DEVICE — CVR HNDL LIGHT RIGID

## (undated) DEVICE — TOOL 14MH30 LEGEND 14CM 3MM: Brand: MIDAS REX ™

## (undated) DEVICE — SUT VIC 3/0 PS2 27IN J427H